# Patient Record
Sex: MALE | Race: WHITE | NOT HISPANIC OR LATINO | Employment: OTHER | URBAN - METROPOLITAN AREA
[De-identification: names, ages, dates, MRNs, and addresses within clinical notes are randomized per-mention and may not be internally consistent; named-entity substitution may affect disease eponyms.]

---

## 2017-01-03 ENCOUNTER — TRANSCRIBE ORDERS (OUTPATIENT)
Dept: ADMINISTRATIVE | Facility: HOSPITAL | Age: 77
End: 2017-01-03

## 2017-01-03 DIAGNOSIS — C23 MALIGNANT NEOPLASM OF GALLBLADDER (HCC): ICD-10-CM

## 2017-01-03 DIAGNOSIS — E83.42 HYPOMAGNESEMIA: ICD-10-CM

## 2017-01-03 DIAGNOSIS — C78.1 SECONDARY MALIGNANT NEOPLASM OF MEDIASTINUM (HCC): Primary | ICD-10-CM

## 2017-01-04 ENCOUNTER — TRANSCRIBE ORDERS (OUTPATIENT)
Dept: LAB | Facility: CLINIC | Age: 77
End: 2017-01-04

## 2017-01-04 ENCOUNTER — APPOINTMENT (OUTPATIENT)
Dept: LAB | Facility: CLINIC | Age: 77
End: 2017-01-04
Payer: MEDICARE

## 2017-01-04 DIAGNOSIS — E83.42 HYPOMAGNESEMIA: ICD-10-CM

## 2017-01-04 DIAGNOSIS — C23 MALIGNANT NEOPLASM OF GALLBLADDER (HCC): ICD-10-CM

## 2017-01-04 DIAGNOSIS — C74.10: Primary | ICD-10-CM

## 2017-01-04 DIAGNOSIS — C74.10: ICD-10-CM

## 2017-01-04 LAB
ANION GAP SERPL CALCULATED.3IONS-SCNC: 3 MMOL/L (ref 4–13)
BUN SERPL-MCNC: 16 MG/DL (ref 5–25)
CALCIUM SERPL-MCNC: 8.3 MG/DL (ref 8.3–10.1)
CHLORIDE SERPL-SCNC: 109 MMOL/L (ref 100–108)
CO2 SERPL-SCNC: 31 MMOL/L (ref 21–32)
CREAT SERPL-MCNC: 1.15 MG/DL (ref 0.6–1.3)
GFR SERPL CREATININE-BSD FRML MDRD: >60 ML/MIN/1.73SQ M
GLUCOSE SERPL-MCNC: 137 MG/DL (ref 65–140)
POTASSIUM SERPL-SCNC: 4.2 MMOL/L (ref 3.5–5.3)
SODIUM SERPL-SCNC: 143 MMOL/L (ref 136–145)

## 2017-01-04 PROCEDURE — 36415 COLL VENOUS BLD VENIPUNCTURE: CPT

## 2017-01-04 PROCEDURE — 80048 BASIC METABOLIC PNL TOTAL CA: CPT

## 2017-01-06 ENCOUNTER — HOSPITAL ENCOUNTER (OUTPATIENT)
Dept: RADIOLOGY | Age: 77
Discharge: HOME | End: 2017-01-06
Payer: MEDICARE

## 2017-01-06 DIAGNOSIS — E83.42 HYPOMAGNESEMIA: ICD-10-CM

## 2017-01-06 DIAGNOSIS — C78.1 SECONDARY MALIGNANT NEOPLASM OF MEDIASTINUM (HCC): ICD-10-CM

## 2017-01-06 DIAGNOSIS — C23 MALIGNANT NEOPLASM OF GALLBLADDER (HCC): ICD-10-CM

## 2017-01-06 PROCEDURE — 71260 CT THORAX DX C+: CPT

## 2017-01-06 PROCEDURE — 74177 CT ABD & PELVIS W/CONTRAST: CPT

## 2017-01-06 RX ADMIN — IOHEXOL 100 ML: 350 INJECTION, SOLUTION INTRAVENOUS at 09:53

## 2017-01-10 ENCOUNTER — ALLSCRIPTS OFFICE VISIT (OUTPATIENT)
Dept: OTHER | Facility: OTHER | Age: 77
End: 2017-01-10

## 2017-01-12 ENCOUNTER — ALLSCRIPTS OFFICE VISIT (OUTPATIENT)
Dept: OTHER | Facility: OTHER | Age: 77
End: 2017-01-12

## 2017-01-13 ENCOUNTER — GENERIC CONVERSION - ENCOUNTER (OUTPATIENT)
Dept: OTHER | Facility: OTHER | Age: 77
End: 2017-01-13

## 2017-01-17 ENCOUNTER — LAB CONVERSION - ENCOUNTER (OUTPATIENT)
Dept: OTHER | Facility: OTHER | Age: 77
End: 2017-01-17

## 2017-01-17 ENCOUNTER — LAB REQUISITION (OUTPATIENT)
Dept: LAB | Facility: HOSPITAL | Age: 77
End: 2017-01-17
Payer: MEDICARE

## 2017-01-17 DIAGNOSIS — E83.42 HYPOMAGNESEMIA: ICD-10-CM

## 2017-01-17 DIAGNOSIS — C7A.1 MALIGNANT POORLY DIFFERENTIATED NEUROENDOCRINE TUMORS (HCC): ICD-10-CM

## 2017-01-17 DIAGNOSIS — C23 MALIGNANT NEOPLASM OF GALLBLADDER (HCC): ICD-10-CM

## 2017-01-17 LAB
APTT PPP: 30 SECONDS (ref 24–36)
DEPRECATED D DIMER PPP: 784 NG/ML (FEU) (ref 0–424)
INR PPP: 1.11 (ref 0.86–1.16)
PROTHROMBIN TIME: 14.4 SECONDS (ref 12–14.3)

## 2017-01-17 PROCEDURE — 85730 THROMBOPLASTIN TIME PARTIAL: CPT | Performed by: NURSE PRACTITIONER

## 2017-01-17 PROCEDURE — 85610 PROTHROMBIN TIME: CPT | Performed by: NURSE PRACTITIONER

## 2017-01-17 PROCEDURE — 85379 FIBRIN DEGRADATION QUANT: CPT | Performed by: NURSE PRACTITIONER

## 2017-01-17 PROCEDURE — 83519 RIA NONANTIBODY: CPT | Performed by: NURSE PRACTITIONER

## 2017-01-25 LAB — NSE SERPL IA-MCNC: 1.4 NG/ML (ref 0–12.5)

## 2017-02-15 ENCOUNTER — ALLSCRIPTS OFFICE VISIT (OUTPATIENT)
Dept: OTHER | Facility: OTHER | Age: 77
End: 2017-02-15

## 2017-03-07 ENCOUNTER — GENERIC CONVERSION - ENCOUNTER (OUTPATIENT)
Dept: OTHER | Facility: OTHER | Age: 77
End: 2017-03-07

## 2017-04-13 ENCOUNTER — APPOINTMENT (OUTPATIENT)
Dept: RADIATION ONCOLOGY | Facility: HOSPITAL | Age: 77
End: 2017-04-13
Payer: MEDICARE

## 2017-04-13 ENCOUNTER — GENERIC CONVERSION - ENCOUNTER (OUTPATIENT)
Dept: OTHER | Facility: OTHER | Age: 77
End: 2017-04-13

## 2017-04-13 PROCEDURE — 99213 OFFICE O/P EST LOW 20 MIN: CPT | Performed by: RADIOLOGY

## 2017-04-17 ENCOUNTER — GENERIC CONVERSION - ENCOUNTER (OUTPATIENT)
Dept: OTHER | Facility: OTHER | Age: 77
End: 2017-04-17

## 2017-04-18 ENCOUNTER — LAB REQUISITION (OUTPATIENT)
Dept: LAB | Facility: HOSPITAL | Age: 77
End: 2017-04-18
Payer: MEDICARE

## 2017-04-18 DIAGNOSIS — C7A.1 MALIGNANT POORLY DIFFERENTIATED NEUROENDOCRINE TUMORS (HCC): ICD-10-CM

## 2017-04-18 DIAGNOSIS — E55.9 VITAMIN D DEFICIENCY: ICD-10-CM

## 2017-04-18 DIAGNOSIS — C23 MALIGNANT NEOPLASM OF GALLBLADDER (HCC): ICD-10-CM

## 2017-04-18 DIAGNOSIS — E83.42 HYPOMAGNESEMIA: ICD-10-CM

## 2017-04-18 LAB — 25(OH)D3 SERPL-MCNC: 37.8 NG/ML (ref 30–100)

## 2017-04-18 PROCEDURE — 82105 ALPHA-FETOPROTEIN SERUM: CPT | Performed by: NURSE PRACTITIONER

## 2017-04-18 PROCEDURE — 83519 RIA NONANTIBODY: CPT | Performed by: NURSE PRACTITIONER

## 2017-04-18 PROCEDURE — 82306 VITAMIN D 25 HYDROXY: CPT | Performed by: NURSE PRACTITIONER

## 2017-04-19 LAB — AFP-TM SERPL-MCNC: 1.3 NG/ML (ref 0–8.3)

## 2017-04-25 LAB — NSE SERPL IA-MCNC: 1.7 NG/ML (ref 0–12.5)

## 2017-04-27 ENCOUNTER — APPOINTMENT (EMERGENCY)
Dept: RADIOLOGY | Facility: HOSPITAL | Age: 77
End: 2017-04-27
Payer: MEDICARE

## 2017-04-27 ENCOUNTER — HOSPITAL ENCOUNTER (EMERGENCY)
Facility: HOSPITAL | Age: 77
Discharge: HOME | End: 2017-04-27
Attending: EMERGENCY MEDICINE | Admitting: EMERGENCY MEDICINE
Payer: MEDICARE

## 2017-04-27 VITALS
WEIGHT: 159.39 LBS | HEART RATE: 73 BPM | TEMPERATURE: 98.2 F | SYSTOLIC BLOOD PRESSURE: 115 MMHG | OXYGEN SATURATION: 100 % | RESPIRATION RATE: 18 BRPM | DIASTOLIC BLOOD PRESSURE: 66 MMHG

## 2017-04-27 DIAGNOSIS — J18.9 PNEUMONIA OF LEFT LUNG DUE TO INFECTIOUS ORGANISM, UNSPECIFIED PART OF LUNG: Primary | ICD-10-CM

## 2017-04-27 LAB
ALBUMIN SERPL BCP-MCNC: 2.5 G/DL (ref 3.5–5)
ALP SERPL-CCNC: 101 U/L (ref 46–116)
ALT SERPL W P-5'-P-CCNC: 20 U/L (ref 12–78)
ANION GAP SERPL CALCULATED.3IONS-SCNC: 7 MMOL/L (ref 4–13)
AST SERPL W P-5'-P-CCNC: 17 U/L (ref 5–45)
BASOPHILS # BLD AUTO: 0.02 THOUSANDS/ΜL (ref 0–0.1)
BASOPHILS NFR BLD AUTO: 0 % (ref 0–1)
BILIRUB SERPL-MCNC: 0.5 MG/DL (ref 0.2–1)
BUN SERPL-MCNC: 18 MG/DL (ref 5–25)
CALCIUM SERPL-MCNC: 8.6 MG/DL (ref 8.3–10.1)
CHLORIDE SERPL-SCNC: 103 MMOL/L (ref 100–108)
CO2 SERPL-SCNC: 29 MMOL/L (ref 21–32)
CREAT SERPL-MCNC: 1.34 MG/DL (ref 0.6–1.3)
EOSINOPHIL # BLD AUTO: 0.12 THOUSAND/ΜL (ref 0–0.61)
EOSINOPHIL NFR BLD AUTO: 1 % (ref 0–6)
ERYTHROCYTE [DISTWIDTH] IN BLOOD BY AUTOMATED COUNT: 14.5 % (ref 11.6–15.1)
GFR SERPL CREATININE-BSD FRML MDRD: 51.6 ML/MIN/1.73SQ M
GLUCOSE SERPL-MCNC: 148 MG/DL (ref 65–140)
HCT VFR BLD AUTO: 32.6 % (ref 36.5–49.3)
HGB BLD-MCNC: 10.6 G/DL (ref 12–17)
LYMPHOCYTES # BLD AUTO: 1.07 THOUSANDS/ΜL (ref 0.6–4.47)
LYMPHOCYTES NFR BLD AUTO: 10 % (ref 14–44)
MCH RBC QN AUTO: 29.9 PG (ref 26.8–34.3)
MCHC RBC AUTO-ENTMCNC: 32.5 G/DL (ref 31.4–37.4)
MCV RBC AUTO: 92 FL (ref 82–98)
MONOCYTES # BLD AUTO: 1.28 THOUSAND/ΜL (ref 0.17–1.22)
MONOCYTES NFR BLD AUTO: 12 % (ref 4–12)
NEUTROPHILS # BLD AUTO: 7.96 THOUSANDS/ΜL (ref 1.85–7.62)
NEUTS SEG NFR BLD AUTO: 77 % (ref 43–75)
PLATELET # BLD AUTO: 124 THOUSANDS/UL (ref 149–390)
PMV BLD AUTO: 9.8 FL (ref 8.9–12.7)
POTASSIUM SERPL-SCNC: 3.6 MMOL/L (ref 3.5–5.3)
PROT SERPL-MCNC: 7.6 G/DL (ref 6.4–8.2)
RBC # BLD AUTO: 3.55 MILLION/UL (ref 3.88–5.62)
SODIUM SERPL-SCNC: 139 MMOL/L (ref 136–145)
WBC # BLD AUTO: 10.45 THOUSAND/UL (ref 4.31–10.16)

## 2017-04-27 PROCEDURE — 36415 COLL VENOUS BLD VENIPUNCTURE: CPT | Performed by: PHYSICIAN ASSISTANT

## 2017-04-27 PROCEDURE — 80053 COMPREHEN METABOLIC PANEL: CPT | Performed by: PHYSICIAN ASSISTANT

## 2017-04-27 PROCEDURE — 85025 COMPLETE CBC W/AUTO DIFF WBC: CPT | Performed by: PHYSICIAN ASSISTANT

## 2017-04-27 PROCEDURE — 99283 EMERGENCY DEPT VISIT LOW MDM: CPT

## 2017-04-27 PROCEDURE — 71020 HB CHEST X-RAY 2VW FRONTAL&LATL: CPT

## 2017-04-27 RX ORDER — AZITHROMYCIN 250 MG/1
250 TABLET, FILM COATED ORAL DAILY
Qty: 4 TABLET | Refills: 0 | Status: SHIPPED | OUTPATIENT
Start: 2017-04-27 | End: 2017-05-01

## 2017-04-27 RX ORDER — AZITHROMYCIN 250 MG/1
500 TABLET, FILM COATED ORAL ONCE
Status: COMPLETED | OUTPATIENT
Start: 2017-04-27 | End: 2017-04-27

## 2017-04-27 RX ADMIN — AZITHROMYCIN 500 MG: 250 TABLET, FILM COATED ORAL at 11:31

## 2017-05-02 ENCOUNTER — ALLSCRIPTS OFFICE VISIT (OUTPATIENT)
Dept: OTHER | Facility: OTHER | Age: 77
End: 2017-05-02

## 2017-05-02 DIAGNOSIS — J18.9 PNEUMONIA: ICD-10-CM

## 2017-05-11 ENCOUNTER — TRANSCRIBE ORDERS (OUTPATIENT)
Dept: ADMINISTRATIVE | Age: 77
End: 2017-05-11

## 2017-05-11 ENCOUNTER — HOSPITAL ENCOUNTER (OUTPATIENT)
Dept: RADIOLOGY | Age: 77
Discharge: HOME | End: 2017-05-11
Payer: MEDICARE

## 2017-05-11 DIAGNOSIS — J18.9 PNEUMONIA: ICD-10-CM

## 2017-05-11 PROCEDURE — 71020 HB CHEST X-RAY 2VW FRONTAL&LATL: CPT

## 2017-06-01 ENCOUNTER — GENERIC CONVERSION - ENCOUNTER (OUTPATIENT)
Dept: OTHER | Facility: OTHER | Age: 77
End: 2017-06-01

## 2017-07-11 ENCOUNTER — TRANSCRIBE ORDERS (OUTPATIENT)
Dept: ADMINISTRATIVE | Facility: HOSPITAL | Age: 77
End: 2017-07-11

## 2017-07-11 ENCOUNTER — LAB REQUISITION (OUTPATIENT)
Dept: LAB | Facility: HOSPITAL | Age: 77
End: 2017-07-11
Payer: MEDICARE

## 2017-07-11 ENCOUNTER — GENERIC CONVERSION - ENCOUNTER (OUTPATIENT)
Dept: OTHER | Facility: OTHER | Age: 77
End: 2017-07-11

## 2017-07-11 DIAGNOSIS — C78.1 SECONDARY MALIGNANT NEOPLASM OF MEDIASTINUM (HCC): Primary | ICD-10-CM

## 2017-07-11 DIAGNOSIS — E55.9 VITAMIN D DEFICIENCY: ICD-10-CM

## 2017-07-11 DIAGNOSIS — C23 MALIGNANT NEOPLASM OF GALLBLADDER (HCC): ICD-10-CM

## 2017-07-11 DIAGNOSIS — C7A.1 MALIGNANT POORLY DIFFERENTIATED NEUROENDOCRINE TUMORS (HCC): ICD-10-CM

## 2017-07-11 LAB — 25(OH)D3 SERPL-MCNC: 38.8 NG/ML (ref 30–100)

## 2017-07-11 PROCEDURE — 82306 VITAMIN D 25 HYDROXY: CPT | Performed by: NURSE PRACTITIONER

## 2017-07-11 PROCEDURE — 82105 ALPHA-FETOPROTEIN SERUM: CPT | Performed by: NURSE PRACTITIONER

## 2017-07-11 PROCEDURE — 83519 RIA NONANTIBODY: CPT | Performed by: NURSE PRACTITIONER

## 2017-07-12 LAB — AFP-TM SERPL-MCNC: 1.7 NG/ML (ref 0–8.3)

## 2017-07-13 LAB — NSE SERPL IA-MCNC: 4 NG/ML (ref 0–12.5)

## 2017-07-18 ENCOUNTER — ALLSCRIPTS OFFICE VISIT (OUTPATIENT)
Dept: OTHER | Facility: OTHER | Age: 77
End: 2017-07-18

## 2017-07-19 ENCOUNTER — HOSPITAL ENCOUNTER (OUTPATIENT)
Dept: RADIOLOGY | Age: 77
Discharge: HOME | End: 2017-07-19
Payer: MEDICARE

## 2017-07-19 DIAGNOSIS — C78.1 SECONDARY MALIGNANT NEOPLASM OF MEDIASTINUM (HCC): ICD-10-CM

## 2017-07-19 PROCEDURE — 71260 CT THORAX DX C+: CPT

## 2017-07-19 PROCEDURE — 74177 CT ABD & PELVIS W/CONTRAST: CPT

## 2017-07-19 RX ADMIN — IOHEXOL 100 ML: 350 INJECTION, SOLUTION INTRAVENOUS at 15:22

## 2017-08-08 ENCOUNTER — GENERIC CONVERSION - ENCOUNTER (OUTPATIENT)
Dept: OTHER | Facility: OTHER | Age: 77
End: 2017-08-08

## 2017-08-15 ENCOUNTER — ALLSCRIPTS OFFICE VISIT (OUTPATIENT)
Dept: OTHER | Facility: OTHER | Age: 77
End: 2017-08-15

## 2017-09-12 ENCOUNTER — GENERIC CONVERSION - ENCOUNTER (OUTPATIENT)
Dept: OTHER | Facility: OTHER | Age: 77
End: 2017-09-12

## 2017-10-13 ENCOUNTER — LAB CONVERSION - ENCOUNTER (OUTPATIENT)
Dept: OTHER | Facility: OTHER | Age: 77
End: 2017-10-13

## 2017-10-13 ENCOUNTER — LAB REQUISITION (OUTPATIENT)
Dept: LAB | Facility: HOSPITAL | Age: 77
End: 2017-10-13
Payer: MEDICARE

## 2017-10-13 DIAGNOSIS — C23 MALIGNANT NEOPLASM OF GALLBLADDER (HCC): ICD-10-CM

## 2017-10-13 DIAGNOSIS — C7A.1 MALIGNANT POORLY DIFFERENTIATED NEUROENDOCRINE TUMORS (HCC): ICD-10-CM

## 2017-10-13 PROCEDURE — 83519 RIA NONANTIBODY: CPT | Performed by: INTERNAL MEDICINE

## 2017-10-13 PROCEDURE — 82105 ALPHA-FETOPROTEIN SERUM: CPT | Performed by: INTERNAL MEDICINE

## 2017-10-14 LAB — AFP-TM SERPL-MCNC: 1.5 NG/ML (ref 0–8.3)

## 2017-10-19 LAB — NSE SERPL IA-MCNC: 4.9 NG/ML (ref 0–12.5)

## 2017-11-16 ENCOUNTER — GENERIC CONVERSION - ENCOUNTER (OUTPATIENT)
Dept: OTHER | Facility: OTHER | Age: 77
End: 2017-11-16

## 2017-11-16 ENCOUNTER — APPOINTMENT (OUTPATIENT)
Dept: RADIATION ONCOLOGY | Facility: HOSPITAL | Age: 77
End: 2017-11-16
Attending: RADIOLOGY
Payer: MEDICARE

## 2017-11-16 PROCEDURE — 99214 OFFICE O/P EST MOD 30 MIN: CPT | Performed by: RADIOLOGY

## 2017-12-06 ENCOUNTER — ALLSCRIPTS OFFICE VISIT (OUTPATIENT)
Dept: OTHER | Facility: OTHER | Age: 77
End: 2017-12-06

## 2017-12-06 DIAGNOSIS — I44.1 SECOND DEGREE ATRIOVENTRICULAR BLOCK: ICD-10-CM

## 2017-12-06 DIAGNOSIS — I25.10 ATHEROSCLEROTIC HEART DISEASE OF NATIVE CORONARY ARTERY WITHOUT ANGINA PECTORIS: ICD-10-CM

## 2017-12-20 ENCOUNTER — APPOINTMENT (OUTPATIENT)
Dept: LAB | Facility: MEDICAL CENTER | Age: 77
End: 2017-12-20
Payer: MEDICARE

## 2017-12-20 DIAGNOSIS — I25.10 ATHEROSCLEROTIC HEART DISEASE OF NATIVE CORONARY ARTERY WITHOUT ANGINA PECTORIS: ICD-10-CM

## 2017-12-20 DIAGNOSIS — I44.1 SECOND DEGREE ATRIOVENTRICULAR BLOCK: ICD-10-CM

## 2017-12-20 LAB
ALBUMIN SERPL BCP-MCNC: 2.8 G/DL (ref 3.5–5)
ALP SERPL-CCNC: 92 U/L (ref 46–116)
ALT SERPL W P-5'-P-CCNC: 41 U/L (ref 12–78)
AST SERPL W P-5'-P-CCNC: 24 U/L (ref 5–45)
BILIRUB DIRECT SERPL-MCNC: 0.15 MG/DL (ref 0–0.2)
BILIRUB SERPL-MCNC: 0.4 MG/DL (ref 0.2–1)
CHOLEST SERPL-MCNC: 108 MG/DL (ref 50–200)
HDLC SERPL-MCNC: 66 MG/DL (ref 40–60)
LDLC SERPL CALC-MCNC: 32 MG/DL (ref 0–100)
PROT SERPL-MCNC: 7.5 G/DL (ref 6.4–8.2)
TRIGL SERPL-MCNC: 49 MG/DL

## 2017-12-20 PROCEDURE — 80061 LIPID PANEL: CPT

## 2017-12-20 PROCEDURE — 36415 COLL VENOUS BLD VENIPUNCTURE: CPT

## 2017-12-20 PROCEDURE — 80076 HEPATIC FUNCTION PANEL: CPT

## 2017-12-22 ENCOUNTER — HOSPITAL ENCOUNTER (OUTPATIENT)
Dept: NON INVASIVE DIAGNOSTICS | Facility: CLINIC | Age: 77
Discharge: HOME | End: 2017-12-22
Payer: MEDICARE

## 2017-12-22 ENCOUNTER — GENERIC CONVERSION - ENCOUNTER (OUTPATIENT)
Dept: CARDIOLOGY CLINIC | Facility: CLINIC | Age: 77
End: 2017-12-22

## 2017-12-22 DIAGNOSIS — I44.1 SECOND DEGREE ATRIOVENTRICULAR BLOCK: ICD-10-CM

## 2017-12-22 PROCEDURE — 93306 TTE W/DOPPLER COMPLETE: CPT

## 2017-12-22 PROCEDURE — 93226 XTRNL ECG REC<48 HR SCAN A/R: CPT

## 2017-12-22 PROCEDURE — 93225 XTRNL ECG REC<48 HRS REC: CPT

## 2018-01-12 VITALS
WEIGHT: 153 LBS | DIASTOLIC BLOOD PRESSURE: 60 MMHG | TEMPERATURE: 97.5 F | HEART RATE: 78 BPM | HEIGHT: 70 IN | BODY MASS INDEX: 21.9 KG/M2 | SYSTOLIC BLOOD PRESSURE: 110 MMHG | OXYGEN SATURATION: 98 %

## 2018-01-12 VITALS
DIASTOLIC BLOOD PRESSURE: 52 MMHG | BODY MASS INDEX: 22.19 KG/M2 | SYSTOLIC BLOOD PRESSURE: 102 MMHG | HEART RATE: 67 BPM | WEIGHT: 155 LBS | OXYGEN SATURATION: 98 % | TEMPERATURE: 97.4 F | HEIGHT: 70 IN | RESPIRATION RATE: 16 BRPM

## 2018-01-13 VITALS
HEIGHT: 70 IN | BODY MASS INDEX: 22.19 KG/M2 | RESPIRATION RATE: 18 BRPM | HEART RATE: 93 BPM | DIASTOLIC BLOOD PRESSURE: 58 MMHG | TEMPERATURE: 96.4 F | WEIGHT: 155 LBS | SYSTOLIC BLOOD PRESSURE: 106 MMHG | OXYGEN SATURATION: 99 %

## 2018-01-13 VITALS
SYSTOLIC BLOOD PRESSURE: 102 MMHG | RESPIRATION RATE: 16 BRPM | WEIGHT: 154.03 LBS | HEART RATE: 79 BPM | HEIGHT: 70 IN | OXYGEN SATURATION: 99 % | TEMPERATURE: 97.6 F | DIASTOLIC BLOOD PRESSURE: 64 MMHG | BODY MASS INDEX: 22.05 KG/M2

## 2018-01-13 VITALS
TEMPERATURE: 97.4 F | RESPIRATION RATE: 24 BRPM | DIASTOLIC BLOOD PRESSURE: 54 MMHG | HEIGHT: 70 IN | HEART RATE: 68 BPM | WEIGHT: 155 LBS | BODY MASS INDEX: 22.19 KG/M2 | SYSTOLIC BLOOD PRESSURE: 118 MMHG

## 2018-01-14 VITALS
TEMPERATURE: 97.4 F | DIASTOLIC BLOOD PRESSURE: 70 MMHG | WEIGHT: 154 LBS | BODY MASS INDEX: 22.05 KG/M2 | SYSTOLIC BLOOD PRESSURE: 126 MMHG | RESPIRATION RATE: 16 BRPM | HEIGHT: 70 IN | HEART RATE: 62 BPM

## 2018-01-15 VITALS
HEIGHT: 70 IN | SYSTOLIC BLOOD PRESSURE: 104 MMHG | BODY MASS INDEX: 21.9 KG/M2 | OXYGEN SATURATION: 98 % | WEIGHT: 153 LBS | TEMPERATURE: 97.5 F | HEART RATE: 65 BPM | DIASTOLIC BLOOD PRESSURE: 58 MMHG

## 2018-01-16 ENCOUNTER — ALLSCRIPTS OFFICE VISIT (OUTPATIENT)
Dept: OTHER | Facility: OTHER | Age: 78
End: 2018-01-16

## 2018-01-17 NOTE — PROGRESS NOTES
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)   2  History of gallbladder cancer (V10 09) (Z85 09)    Plan   Carcinoma of esophagus    · Follow-up visit in 6 months Evaluation and Treatment  Follow-up  Status: Hold For -    Scheduling  Requested for: [id number]   Ordered; For: Carcinoma of esophagus; Ordered By: Edith Maddox Performed:  Due: 89NRB3349    Discussion/Summary   Discussion Summary:    74-year-old male with a remote history of gallbladder carcinoma and now with a [id number] esophageal carcinoma  The initial pathology revealed a neuroendocrine carcinoma, but the surgical pathology on the resection revealed adenocarcinoma with neuroendocrine features  At this time he is doing relatively well  He is clinically MILTON from his esophageal cancer at nearly 3 years and 10 years from his gallbladder cancer recurrence  He will continue his current regimen of nutritional supplements to maintain his weight  He is following up with his medical oncologist this week  I will see him again in 6 months for another exam  His imaging is usually ordered by his medical oncologist so we will await the next set of imaging  He will continue endoscopic surveillance with his gastroenterologist  He is agreeable to this  All his questions were answered  Counseling Documentation With Imm: The patient, patient's family was counseled regarding prognosis  Goals and Barriers: The patient has the current Goals: Cure  The patent has the current Barriers: None  Patient's Capacity to Self-Care: Patient is able to Self-Care  Medication SE Review and Pt Understands Tx: The treatment plan was reviewed with the patient/guardian  The patient/guardian understands and agrees with the treatment plan    Self Referrals:    Self Referrals: No      Chief Complaint   Chief Complaint Free Text Note Form: Patient here for a six month follow up for carcinoma of esophagus   Patient has no complaints today      History of Present Illness Diagnosis and Staging: Gallbladder cancer: T2N0M0   initially treated 2005 treated February 2008 30cmand 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 March 2015    Treatment History: Radical cholecystectomy with liver resection 2005 treated with resection of segment 5 lesion in 2008 and etoposide November 2014 for his esophageal carcinoma completed January 20, 2015 esophagectomy, March 2015    Current Therapy: Observation    Disease Status: MILTON    Interval History: Patient returns after his esophagectomy  He is doing well at this time with no complaints  His weight is stable  He continues to have some early satiety and he does vomit if he eats too much  He is eating all food without difficulty  His last CT in July was negative, he thinks he will be getting a CT shortly after he sees medical oncology later this week  Review of Systems   Complete Male ROS Surg Onc:      Constitutional: The patient denies new or recent history of general fatigue, no recent weight loss, no change in appetite  Eyes: No complaints of visual problems, no scleral icterus  ENT: no complaints of ear pain, no hoarseness, no difficulty swallowing,-- no tinnitus-- and-- no new masses in head, oral cavity, or neck  Cardiovascular: No complaints of chest pain, no palpitations, no ankle edema  Respiratory: No complaints of shortness of breath, no cough  Gastrointestinal: No complaints of jaundice, no bloody stools, no pale stools  Genitourinary: No complaints of dysuria, no hematuria, no nocturia, no frequent urination, no urethral discharge  Musculoskeletal: No complaints of weakness, paralysis, joint stiffness or arthralgias,  Integumentary: No complaints of rash, no new lesions  Neurological: No complaints of convulsions, no seizures, no dizziness  Hematologic/Lymphatic: No complaints of easy bruising  ROS Reviewed:    ROS reviewed        Active Problems 1  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)   2  Cataract (366 9) (H26 9)   3  Community acquired pneumonia (5) (J18 9)   4  Coronary artery disease with hx of myocardial infarct w/o hx of CABG (521 42,432)     (I25 10,I25 2)   5  Encounter for pre-operative cardiovascular clearance (V72 81) (Z01 810)   6  Enlarged prostate without lower urinary tract symptoms (luts) (600 00) (N40 0)   7  Epistaxis (784 7) (R04 0)   8  GERD without esophagitis (530 81) (K21 9)   9  Gout (274 9) (M10 9)   10  Hypercholesterolemia (272 0) (E78 00)   11  Medicare annual wellness visit, initial (V70 0) (Z00 00)   12  Need for vaccination with 13-polyvalent pneumococcal conjugate vaccine (V03 82) (Z23)   13  Screening for depression (V79 0) (Z13 89)   14  Screening for genitourinary condition (V81 6) (Z13 89)   15  Screening for neurological condition (V80 09) (Z13 89)   16  Seborrheic keratosis (702 19) (L82 1)   17  Second degree AV block, Mobitz type I (426 13) (I44 1)   18  Skin lesion of face (709 9) (L98 9)   19  Weight loss (783 21) (R63 4)    Past Medical History   1  History of Abnormal EKG (794 31) (R94 31)   2  History of Acute Myocardial Infarction (V12 59)   3  History of Benign essential hypertension (401 1) (I10)   4  History of Diabetic hypoglycemia (250 80) (E11 649)   5  History of Dry skin (701 1) (L85 3)   6  History of fatigue (V13 89) (Z87 898)   7  History of hypotension (V12 59) (Z86 79)   8  History of myocardial infarction (412) (I25 2)   9  History of type 2 diabetes mellitus (V12 29) (Z86 39)   10  History of Hypoxia (799 02) (R09 02)   11  History of Liver Cancer (V10 07)   12  History of Need for influenza vaccination (V04 81) (Z23)   13  History of Obesity (278 00) (E66 9)   14  History of Puncture Wound Of Thumb (883 0)   15  History of Superficial Injury Of Foot (917 8)   16  History of Tachycardia (785 0) (R00 0)   17  History of Vitamin D deficiency (268 9) (E55 9)    Surgical History   1   History of Cataract Extraction With Insertion Of Intraocular Lens   · bilateral   2  History of Cath Placement Of Stent 1   3  History of Gallbladder Surgery   4  History of Hip Surgery   5  History of Previous Laser Angioplasty   · with stent placement   6  History of Tonsillectomy  Surgical History Reviewed: The surgical history was reviewed and updated today  Family History   Mother    1  Family history of Leukemia (V16 6)  Son    2  Family history of Lymphoma (V16 7)   3  Family history of Lymphoma (V16 7)  Family History Reviewed: The family history was reviewed and updated today  Social History    · Alcohol Use (History)   · Denied: Drug use (305 90) (F19 90)   · Former smoker (P70 62) (W51 407)  Social History Reviewed: The social history was reviewed and updated today  Current Meds    1  Allopurinol 100 MG Oral Tablet; 1 TAB TWICE A DAY; Therapy: 00KWT9287 to (Evaluate:05Jan2018)  Requested for: 89DKI5617; Last     Rx:10Jan2017 Ordered   2  Aspirin 81 MG TABS; TAKE 2 TABLET Every morning; Therapy: 53Mtt9997 to Recorded   3  Atorvastatin Calcium 40 MG Oral Tablet; TAKE 1 5 TABLET Daily; Therapy: 93VOR5418 to (Last Rx:10Jan2017) Ordered   4  Finasteride 5 MG Oral Tablet; 1 TAB AT BEDTIME; Last Rx:10Jan2017 Ordered   5  Fortify Daily Probiotic CAPS; take 1 capsule daily; Therapy: (Recorded:13Apr2017) to Recorded   6  Iron 325 (65 Fe) MG Oral Tablet; TAKE 1 TABLET DAILY AS DIRECTED; Therapy: 87XXB5554 to Recorded   7  Magnesium Oxide 400 MG Oral Capsule; TAKE 2 CAPSULE Daily; Therapy: (Recorded:44Gqg5283) to Recorded   8  NexIUM 40 MG Oral Capsule Delayed Release; TAKE 1 CAPSULE ONCE DAILY; Therapy: (Recorded:13Oct2016) to Recorded   9  Systane Ultra 0 4-0 3 % Ophthalmic Solution; 1 drop each eye daily; Therapy: (Recorded:13Oct2016) to Recorded   10  Terazosin HCl - 1 MG Oral Capsule; TAKE 1 CAPSULE AT BEDTIME NIGHTLY;       Therapy: 14FXW1907 to (Evaluate:05Jan2018) Requested for: 06JIK2802; Last      Rx:10Jan2017 Ordered   11  Vitamin B12 TABS; 1000 mcg take 2 tablets daily; Therapy: (Recorded:10Jan2017) to Recorded   12  Vitamin D 77947 UNIT CAPS; TAKE 1 CAPSULE WEEKLY; Therapy: (Recorded:13Oct2016) to Recorded   13  Zantac 150 MG Oral Tablet; TAKE 1 TABLET AT BEDTIME; Therapy: (Recorded:13Oct2016) to Recorded  Medication List Reviewed: The medication list was reviewed and updated today  Allergies   1  CARBOplatin SOLN    Vitals   Vital Signs    Recorded: 83DDU1274 08:44AM   Temperature 97 4 F   Heart Rate 93   Respiration 16   Systolic 006   Diastolic 58   Height 5 ft 9 in   Weight 155 lb    BMI Calculated 22 89   BSA Calculated 1 85   O2 Saturation 98     Physical Exam        Constitutional: General appearance: The Patient is well-developed, well-nourished male who appears his stated age in no acute distress  He is pleasant and talkative  HEENT: Sclerae are anicteric  -- Mucous membranes are moist  -- Neck is supple without adenopathy  No JVD  Chest: The lungs are clear to auscultation  Cardiac: Heart is regular rate  Abdomen: Abdomen is soft, nontender without masses  Extremities: There is no clubbing or cyanosis  -- There is no edema  Neuro: Grossly nonfocal  -- Gait is normal        Lymphatic: no evidence of cervical adenopathy bilaterally  -- no evidence of axillary adenopathy bilaterally  -- no evidence of inguinal adenopathy bilaterally  Skin: Warm, anicteric  Health Management   Enlarged prostate without lower urinary tract symptoms (luts)   (1) PSA, DIAGNOSTIC (FOLLOW-UP); every 1 year; Last 94FXX9110; Next Due: 09KMM5867; Overdue  History of Benign essential hypertension   (1) COMPREHENSIVE METABOLIC PANEL; every 1 year; Last 95Dph8857; Next Due: 78CLE5541; Overdue  (1) LIPID PANEL, FASTING; every 1 year; Last 53Vlk8801; Next Due: 92Lhv5146; Active  EKG/ECG- POC; every 1 year;  Last 32XWH2370; Next Due: 81YBD3635; Active  History of Encounter for screening colonoscopy   COLONOSCOPY; every 5 years; Next Due: 38Umd9108; Overdue  History of type 2 diabetes mellitus   (1) HEMOGLOBIN A1C; every 3 months; Last 45Yzj5424; Next Due: 54WVF1153; Overdue  (1) MICROALBUMIN CREATININE RATIO, RANDOM URINE; every 1 year; Last 28Vwb6331; Next Due:    15YIE7203; Overdue  *VB - Eye Exam; every 1 year; Last 57Sqs5907; Next Due: 68Gyy6455; Overdue  *VB - Foot Exam; every 1 year; Last 30KII9672; Next Due: 69YAN3472; Overdue  Blood Glucose- POC; every 3 months; Last 16PQU1260; Next Due: 16SKI2327; Overdue  Urine Dip Non-Automated- POC; every 3 months; Last 84BOI7387; Next Due: 09QHP2021; Overdue  Screening for depression   *VB-Depression Screening; every 1 year; Last 18QLP5384; Next Due: 04KSI1292; Overdue  Screening for genitourinary condition   *VB - Fall Risk Assessment  (Dx Z13 89 Screen for Neurologic Disorder); every 1 year; Last    13TBL9708; Next Due: 97DYO0662; Overdue  *VB - Urinary Incontinence Screen (Dx Z13 89 Screen for UI); every 1 year; Last 54EHI8200; Next    Due: 16Gzh6377; Near Due  Screening for neurological condition   *VB - Fall Risk Assessment  (Dx Z13 89 Screen for Neurologic Disorder); every 1 year; Last    61OQC0007; Next Due: 33ERS9216; Overdue  *VB - Urinary Incontinence Screen (Dx Z13 89 Screen for UI); every 1 year; Last 85YWI8015; Next    Due: 45Xhy1470; Near Due  [facility] Maintenance   Medicare Annual Wellness Visit; every 1 year; Last 23SOX3448; Next Due: 09TZO9592; Overdue    Future Appointments      Date/Time Provider Specialty Site   02/20/2018 02:00 PM Long Mattson DO [facility]     End of Encounter Meds   1  Finasteride 5 MG Oral Tablet (Proscar); 1 TAB AT BEDTIME; Last Rx:10Jan2017 Ordered   2  Terazosin HCl - 1 MG Oral Capsule; TAKE 1 CAPSULE AT BEDTIME NIGHTLY;      Therapy: 89CSC4865 to (Evaluate:05Jan2018)  Requested for: 41NSA3381; Last Rx:10Jan2017 Ordered  3  Allopurinol 100 MG Oral Tablet; 1 TAB TWICE A DAY; Therapy: 88LNE8850 to (Evaluate:05Jan2018)  Requested for: 72WDG0207; Last     Rx:10Jan2017 Ordered  4  Magnesium Oxide 400 MG Oral Capsule; TAKE 2 CAPSULE Daily; Therapy: (Recorded:92Tcs9247) to Recorded  5  Atorvastatin Calcium 40 MG Oral Tablet; TAKE 1 5 TABLET Daily; Therapy: 66KDC7472 to (Last Rx:10Jan2017) Ordered  6  Fortify Daily Probiotic CAPS; take 1 capsule daily; Therapy: (Recorded:13Apr2017) to Recorded  7  Aspirin 81 MG TABS; TAKE 2 TABLET Every morning; Therapy: 76Ncq2443 to Recorded   8  Iron 325 (65 Fe) MG Oral Tablet; TAKE 1 TABLET DAILY AS DIRECTED; Therapy: 83LSQ2364 to Recorded   9  NexIUM 40 MG Oral Capsule Delayed Release (Esomeprazole Magnesium); TAKE 1     CAPSULE ONCE DAILY; Therapy: (Recorded:13Oct2016) to Recorded   10  Systane Ultra 0 4-0 3 % Ophthalmic Solution; 1 drop each eye daily; Therapy: (Recorded:13Oct2016) to Recorded   11  Vitamin B12 TABS; 1000 mcg take 2 tablets daily; Therapy: (Recorded:10Jan2017) to Recorded   12  Vitamin D 68024 UNIT CAPS; TAKE 1 CAPSULE WEEKLY; Therapy: (Recorded:13Oct2016) to Recorded   13  Zantac 150 MG Oral Tablet (RaNITidine HCl); TAKE 1 TABLET AT BEDTIME;       Therapy: (Recorded:13Oct2016) to Recorded    Signatures    Electronically signed by : GABRIELLE Bull ; Jan 16 2018  8:58AM EST                       (Author)

## 2018-01-18 ENCOUNTER — LAB REQUISITION (OUTPATIENT)
Dept: LAB | Facility: HOSPITAL | Age: 78
End: 2018-01-18
Payer: MEDICARE

## 2018-01-18 DIAGNOSIS — E83.42 HYPOMAGNESEMIA: ICD-10-CM

## 2018-01-18 DIAGNOSIS — C7A.1 MALIGNANT POORLY DIFFERENTIATED NEUROENDOCRINE TUMORS (HCC): ICD-10-CM

## 2018-01-18 DIAGNOSIS — C23 MALIGNANT NEOPLASM OF GALLBLADDER (HCC): ICD-10-CM

## 2018-01-18 LAB — AFP-TM SERPL-MCNC: 1.6 NG/ML (ref 0.5–8)

## 2018-01-18 PROCEDURE — 83519 RIA NONANTIBODY: CPT | Performed by: NURSE PRACTITIONER

## 2018-01-18 PROCEDURE — 82105 ALPHA-FETOPROTEIN SERUM: CPT | Performed by: NURSE PRACTITIONER

## 2018-01-22 ENCOUNTER — TRANSCRIBE ORDERS (OUTPATIENT)
Dept: ADMINISTRATIVE | Facility: HOSPITAL | Age: 78
End: 2018-01-22

## 2018-01-22 VITALS
SYSTOLIC BLOOD PRESSURE: 110 MMHG | BODY MASS INDEX: 21.62 KG/M2 | DIASTOLIC BLOOD PRESSURE: 58 MMHG | HEIGHT: 70 IN | RESPIRATION RATE: 16 BRPM | HEART RATE: 66 BPM | TEMPERATURE: 98.1 F | WEIGHT: 151 LBS | OXYGEN SATURATION: 98 %

## 2018-01-22 DIAGNOSIS — R60.0 LOCALIZED EDEMA: Primary | ICD-10-CM

## 2018-01-22 DIAGNOSIS — M79.606 PAIN OF LOWER EXTREMITY, UNSPECIFIED LATERALITY: ICD-10-CM

## 2018-01-23 VITALS
BODY MASS INDEX: 22.96 KG/M2 | RESPIRATION RATE: 16 BRPM | HEART RATE: 93 BPM | OXYGEN SATURATION: 98 % | SYSTOLIC BLOOD PRESSURE: 122 MMHG | WEIGHT: 155 LBS | TEMPERATURE: 97.4 F | DIASTOLIC BLOOD PRESSURE: 58 MMHG | HEIGHT: 69 IN

## 2018-01-24 VITALS
WEIGHT: 150 LBS | DIASTOLIC BLOOD PRESSURE: 56 MMHG | HEIGHT: 69 IN | SYSTOLIC BLOOD PRESSURE: 120 MMHG | HEART RATE: 50 BPM | BODY MASS INDEX: 22.22 KG/M2

## 2018-01-25 LAB — NSE SERPL IA-MCNC: 2.9 NG/ML (ref 0–12.5)

## 2018-01-26 ENCOUNTER — HOSPITAL ENCOUNTER (OUTPATIENT)
Dept: NON INVASIVE DIAGNOSTICS | Facility: CLINIC | Age: 78
Discharge: HOME | End: 2018-01-26
Payer: MEDICARE

## 2018-01-26 DIAGNOSIS — R60.0 LOCALIZED EDEMA: ICD-10-CM

## 2018-01-26 PROCEDURE — 93970 EXTREMITY STUDY: CPT

## 2018-01-27 PROCEDURE — 93970 EXTREMITY STUDY: CPT | Performed by: SURGERY

## 2018-02-20 ENCOUNTER — OFFICE VISIT (OUTPATIENT)
Dept: FAMILY MEDICINE CLINIC | Facility: CLINIC | Age: 78
End: 2018-02-20
Payer: MEDICARE

## 2018-02-20 VITALS
WEIGHT: 165 LBS | SYSTOLIC BLOOD PRESSURE: 126 MMHG | OXYGEN SATURATION: 98 % | BODY MASS INDEX: 24.44 KG/M2 | HEART RATE: 77 BPM | DIASTOLIC BLOOD PRESSURE: 64 MMHG | TEMPERATURE: 98.1 F | HEIGHT: 69 IN

## 2018-02-20 DIAGNOSIS — C15.9 CARCINOMA OF ESOPHAGUS (HCC): ICD-10-CM

## 2018-02-20 DIAGNOSIS — R60.0 EDEMA OF LOWER EXTREMITY: Primary | ICD-10-CM

## 2018-02-20 DIAGNOSIS — E78.00 HYPERCHOLESTEREMIA: ICD-10-CM

## 2018-02-20 DIAGNOSIS — K21.9 GERD WITHOUT ESOPHAGITIS: ICD-10-CM

## 2018-02-20 PROCEDURE — 99214 OFFICE O/P EST MOD 30 MIN: CPT | Performed by: FAMILY MEDICINE

## 2018-02-20 RX ORDER — ATORVASTATIN CALCIUM 40 MG/1
40 TABLET, FILM COATED ORAL DAILY
COMMUNITY
Start: 2018-01-19 | End: 2020-11-02

## 2018-02-20 RX ORDER — PNV NO.95/FERROUS FUM/FOLIC AC 28MG-0.8MG
1 TABLET ORAL DAILY
COMMUNITY
Start: 2017-12-06 | End: 2019-05-13 | Stop reason: ALTCHOICE

## 2018-02-20 RX ORDER — FINASTERIDE 5 MG/1
1 TABLET, FILM COATED ORAL
COMMUNITY
End: 2018-06-20 | Stop reason: SDUPTHER

## 2018-02-20 RX ORDER — TERAZOSIN 1 MG/1
1 CAPSULE ORAL
COMMUNITY
Start: 2011-05-18 | End: 2018-06-18 | Stop reason: SINTOL

## 2018-02-20 RX ORDER — ALLOPURINOL 100 MG/1
1 TABLET ORAL 2 TIMES DAILY
COMMUNITY
Start: 2011-05-18 | End: 2020-11-02

## 2018-02-20 RX ORDER — RANITIDINE 150 MG/1
1 TABLET ORAL
COMMUNITY
End: 2018-05-17 | Stop reason: ALTCHOICE

## 2018-02-20 RX ORDER — ERGOCALCIFEROL 1.25 MG/1
CAPSULE ORAL WEEKLY
COMMUNITY
Start: 2018-01-17

## 2018-02-20 RX ORDER — UBIDECARENONE 75 MG
CAPSULE ORAL DAILY
COMMUNITY
End: 2020-11-02

## 2018-02-20 RX ORDER — CALCIUM CARBONATE/VITAMIN D3 500-10/5ML
8 LIQUID (ML) ORAL DAILY
COMMUNITY
End: 2020-11-09 | Stop reason: HOSPADM

## 2018-02-20 RX ORDER — ESOMEPRAZOLE MAGNESIUM 40 MG/1
1 CAPSULE, DELAYED RELEASE ORAL DAILY
COMMUNITY
End: 2018-05-17 | Stop reason: ALTCHOICE

## 2018-02-20 RX ORDER — VIT C/ZINC GLUCONAT/ELDERBERRY 60 MG-5 MG
1 LOZENGE ORAL DAILY
COMMUNITY
End: 2018-05-17 | Stop reason: ALTCHOICE

## 2018-02-20 NOTE — PROGRESS NOTES
HPI:  Fani Norman is a 77 y o  male here for his Subsequent Wellness Visit  There is no problem list on file for this patient      Past Medical History:   Diagnosis Date    Abnormal EKG     Acute myocardial infarction     Benign essential hypertension     LAST ASSESSED: 99UYL7199    Diabetes mellitus [facility] [address]     Diabetic hypoglycemia ([facility] [address] )     LAST ASSESSED: 27LOP0289    Dry skin     LAST ASSESSED: 92FQC5517    Esophageal cancer ([facility] [address] )     H/O cancer of gall bladder     Hypotension     Hypoxia     Liver cancer (HCC)     Myocardial infarction     Obesity     LAST ASSESSED: 47IZV2626    Puncture wound of thumb     LAST ASSESSED: 10VIA3564    Superficial injury of foot     LAST ASSESSED: 30YYN8056    Tachycardia     Type 2 diabetes mellitus (HCC)     LAST ASSESSED: 27XYV0025    Vitamin D deficiency     LAST ASSESSED: 79VRY2408     Past Surgical History:   Procedure Laterality Date    ANGIOPLASTY      PREVIOUS LASER ANGIOPLASTY WITH STENT PLACEMENT    CATARACT EXTRACTION W/  INTRAOCULAR LENS IMPLANT Bilateral     CORONARY ANGIOPLASTY WITH STENT PLACEMENT      PLACEMENT OF STENT 1    GALLBLADDER SURGERY      HIP SURGERY      TONSILLECTOMY       Family History   Problem Relation Age of Onset    Leukemia Mother     Lymphoma Son     Lymphoma Son      History   Smoking Status    Never Smoker   Smokeless Tobacco    Never Used     Comment: FORMER SMOKER - SMOKED 2 PPD FOR 20 YRS AND QUIT 2/15/1975 AS PER [employer]     History   Alcohol Use No     Comment: (HISTORY) RARELY AS PER [employer]      History   Drug Use No     /64 (BP Location: Left arm, Patient Position: Sitting, Cuff Size: Standard)   Pulse 77   Temp 98 1 °F (36 7 °C) (Oral)   Ht 5' 9" (1 753 m)   Wt 74 8 kg (165 lb)   SpO2 98%   BMI 24 37 kg/m²       Current Outpatient Prescriptions   Medication Sig Dispense Refill    allopurinol (ZYLOPRIM) 100 mg tablet Take 1 tablet by mouth 2 (two) times a day      aspirin 81 MG tablet Take 2 tablets by mouth      atorvastatin (LIPITOR) 40 mg tablet       cyanocobalamin (VITAMIN B-12) 100 mcg tablet Take by mouth      ergocalciferol (VITAMIN D2) 50,000 units       esomeprazole (NEXIUM) 40 MG capsule Take 1 capsule by mouth daily      Ferrous Sulfate (IRON) 325 (65 Fe) MG TABS Take 1 tablet by mouth daily      finasteride (PROSCAR) 5 mg tablet Take 1 tablet by mouth      Magnesium Oxide 400 MG CAPS Take 2 capsules by mouth daily      polyethylene glycol-propylene glycol (SYSTANE ULTRA) 0 4-0 3 % Apply 1 drop to eye daily      Probiotic Product (FORTIFY DAILY PROBIOTIC) CAPS Take 1 capsule by mouth daily      ranitidine (ZANTAC) 150 mg tablet Take 1 tablet by mouth      terazosin (HYTRIN) 1 mg capsule Take 1 capsule by mouth       No current facility-administered medications for this visit        No Known Allergies  Immunization History   Administered Date(s) Administered    Influenza Quadrivalent Preservative Free 3 years and older IM 10/10/2014    Influenza Quadrivalent, 6-35 Months IM 09/02/2016    Pneumococcal Conjugate 13-Valent 06/30/2015    Pneumococcal Polysaccharide PPV23 11/18/2014    TD (adult) Preservative Free 03/08/2013       Patient Care Team:  Eugenio Ngo DO as PCP - MD Nayeli Domínguez MD Ronne Foley, MD Evern Oram, MD Fred Rower, Jey Menchaca MD    Medicare Screening Tests and Risk Assessments:  AWV Clinical     ISAR:       Once in a Lifetime Medicare Screening:       Medicare Screening Tests and Risk Assessment:   AAA Risk Assessment    Osteoporosis Risk Assessment    HIV Risk Assessment        Drug and Alcohol Use:   Tobacco use    Tobacco use duration    Tobacco Cessation Readiness    Alcohol use    Alcohol Treatment Readiness   Illicit Drug Use        Diet & Exercise:   Diet   How many servings a day of the following:   Exercise        Cognitive Impairment Screening:   Cognitive Impairment Screening Functional Ability/Level of Safety:   Hearing    Hearing Impairment Assessment    Current Activities    Help needed with the folllowing:    ADL    Fall Risk   Injury History       Home Safety:   Home Safety Risk Factors       Advanced Directives:   Advanced Directives    Patient's End of Life Decisions        Urinary Incontinence:       Glaucoma:            Provider Screening    No data filed        No exam data present    Physical Exam :  Physical Exam    Reviewed Updated [facility] Prior Wellness Visits:   Last Medicare wellness visit information was reviewed, patient interviewed , no change since last AWVyes  Last Medicare wellness visit information was reviewed, patient interviewed and updates made to the record today yes    Assessment and Plan:  No diagnosis found      Health Maintenance Due   Topic Date Due    SLP PLAN OF CARE  1940    COLONOSCOPY  1940    Depression Screening PHQ-9  01/15/1952    Fall Risk  01/15/2004    DTaP,Tdap,and Td Vaccines (2 - Tdap) 04/05/2013    GLAUCOMA SCREENING 67+ YR  12/17/2014    HEMOGLOBIN A1C  06/09/2015    INFLUENZA VACCINE  09/01/2017

## 2018-02-20 NOTE — PROGRESS NOTES
Assessment/Plan: patient here today for follow up for GERD  Pt c/o leg swelling     No problem-specific Assessment & Plan notes found for this encounter  There are no diagnoses linked to this encounter  Subjective:      Patient ID: Fred Rodriguez is a 77 y o  male  Follow up  BLLE edema present past month  Weight up 10 pounds past 5 weeks  Patient has been eating more - candy, milk shakes, spicy gum drops etc         The following portions of the patient's history were reviewed and updated as appropriate: allergies, current medications, past family history, past medical history, past social history, past surgical history and problem list     Review of Systems   Constitutional: Negative  HENT: Negative  Respiratory: Negative  Cardiovascular: Negative  Genitourinary: Negative  Musculoskeletal: Negative  Neurological: Negative  Psychiatric/Behavioral: Negative  Objective:      /64 (BP Location: Left arm, Patient Position: Sitting, Cuff Size: Standard)   Pulse 77   Temp 98 1 °F (36 7 °C) (Oral)   Ht 5' 9" (1 753 m)   Wt 74 8 kg (165 lb)   SpO2 98%   BMI 24 37 kg/m²          Physical Exam   Constitutional: He appears well-developed and well-nourished  HENT:   Head: Normocephalic and atraumatic  Eyes: Conjunctivae are normal  Pupils are equal, round, and reactive to light  Neck: Normal range of motion  Neck supple  Cardiovascular: Normal rate and regular rhythm  Pulmonary/Chest: Effort normal and breath sounds normal    Abdominal: Soft  Bowel sounds are normal    Musculoskeletal: He exhibits edema  +3 pitting edema, 1/2 way up BLLE  Skin: Skin is warm and dry  Psychiatric: He has a normal mood and affect   His behavior is normal  Judgment and thought content normal

## 2018-03-06 ENCOUNTER — OFFICE VISIT (OUTPATIENT)
Dept: FAMILY MEDICINE CLINIC | Facility: CLINIC | Age: 78
End: 2018-03-06
Payer: MEDICARE

## 2018-03-06 VITALS
DIASTOLIC BLOOD PRESSURE: 62 MMHG | OXYGEN SATURATION: 93 % | SYSTOLIC BLOOD PRESSURE: 116 MMHG | HEIGHT: 69 IN | WEIGHT: 152 LBS | TEMPERATURE: 97.7 F | HEART RATE: 69 BPM | BODY MASS INDEX: 22.51 KG/M2

## 2018-03-06 DIAGNOSIS — R12 HEART BURN: Primary | ICD-10-CM

## 2018-03-06 DIAGNOSIS — C15.9 MALIGNANT NEOPLASM OF ESOPHAGUS, UNSPECIFIED LOCATION (HCC): ICD-10-CM

## 2018-03-06 DIAGNOSIS — E55.9 VITAMIN D DEFICIENCY: ICD-10-CM

## 2018-03-06 DIAGNOSIS — E78.00 HYPERCHOLESTEREMIA: ICD-10-CM

## 2018-03-06 DIAGNOSIS — Z00.00 PHYSICAL EXAM: ICD-10-CM

## 2018-03-06 PROCEDURE — 99213 OFFICE O/P EST LOW 20 MIN: CPT | Performed by: FAMILY MEDICINE

## 2018-03-06 NOTE — PROGRESS NOTES
Assessment/Plan: patient here today for two week follow up to review BW     No problem-specific Assessment & Plan notes found for this encounter  1  Heart burn     2  Hypercholesteremia     3  Malignant neoplasm of esophagus, unspecified location ([address] )     4  Vitamin D deficiency            There are no diagnoses linked to this encounter  Subjective:      Patient ID: Katja Beverly is a 77 y o  male  Follow up  Review labs, ankle swelling comes and goes  Appetite good  Labs constant  The following portions of the patient's history were reviewed and updated as appropriate: allergies, current medications, past family history, past medical history, past social history, past surgical history and problem list     Review of Systems   HENT: Negative  Respiratory: Negative  Cardiovascular: Negative for leg swelling  Ankle / pedal edema  Gastrointestinal: Negative  Genitourinary: Negative  Musculoskeletal: Negative  Neurological: Negative  Psychiatric/Behavioral: Negative  Objective:      /62 (BP Location: Right arm, Patient Position: Sitting, Cuff Size: Standard)   Pulse 69   Temp 97 7 °F (36 5 °C) (Oral)   Ht 5' 9" (1 753 m)   Wt 68 9 kg (152 lb)   SpO2 93%   BMI 22 45 kg/m²          Physical Exam   Constitutional: He is oriented to person, place, and time  He appears well-developed and well-nourished  HENT:   Head: Normocephalic and atraumatic  Right Ear: External ear normal    Left Ear: External ear normal    Neck: Normal range of motion  Neck supple  Cardiovascular: Normal rate and regular rhythm  Pulmonary/Chest: Effort normal and breath sounds normal    Neurological: He is alert and oriented to person, place, and time  Skin: Skin is warm and dry  Psychiatric: He has a normal mood and affect   His behavior is normal  Judgment and thought content normal

## 2018-04-12 ENCOUNTER — LAB REQUISITION (OUTPATIENT)
Dept: LAB | Facility: HOSPITAL | Age: 78
End: 2018-04-12
Payer: MEDICARE

## 2018-04-12 DIAGNOSIS — E46 PROTEIN-CALORIE MALNUTRITION (HCC): ICD-10-CM

## 2018-04-12 DIAGNOSIS — R60.9 EDEMA: ICD-10-CM

## 2018-04-12 DIAGNOSIS — K21.9 GASTRO-ESOPHAGEAL REFLUX DISEASE WITHOUT ESOPHAGITIS: ICD-10-CM

## 2018-04-12 DIAGNOSIS — C23 MALIGNANT NEOPLASM OF GALLBLADDER (HCC): ICD-10-CM

## 2018-04-12 DIAGNOSIS — C7A.1 MALIGNANT POORLY DIFFERENTIATED NEUROENDOCRINE TUMORS (HCC): ICD-10-CM

## 2018-04-12 LAB
AFP-TM SERPL-MCNC: 1.3 NG/ML (ref 0.5–8)
NT-PROBNP SERPL-MCNC: 2314 PG/ML

## 2018-04-12 PROCEDURE — 83880 ASSAY OF NATRIURETIC PEPTIDE: CPT | Performed by: NURSE PRACTITIONER

## 2018-04-12 PROCEDURE — 82105 ALPHA-FETOPROTEIN SERUM: CPT | Performed by: NURSE PRACTITIONER

## 2018-04-12 PROCEDURE — 83519 RIA NONANTIBODY: CPT | Performed by: NURSE PRACTITIONER

## 2018-04-19 LAB — NSE SERPL IA-MCNC: 3.3 NG/ML (ref 0–12.5)

## 2018-05-15 PROBLEM — C23 MALIGNANT NEOPLASM OF GALLBLADDER (HCC): Status: ACTIVE | Noted: 2018-05-15

## 2018-05-17 ENCOUNTER — APPOINTMENT (OUTPATIENT)
Dept: RADIATION ONCOLOGY | Facility: HOSPITAL | Age: 78
End: 2018-05-17
Attending: RADIOLOGY
Payer: MEDICARE

## 2018-05-17 ENCOUNTER — RADIATION ONCOLOGY FOLLOW-UP (OUTPATIENT)
Dept: RADIATION ONCOLOGY | Facility: HOSPITAL | Age: 78
End: 2018-05-17

## 2018-05-17 VITALS
TEMPERATURE: 97.4 F | OXYGEN SATURATION: 100 % | DIASTOLIC BLOOD PRESSURE: 60 MMHG | BODY MASS INDEX: 23.82 KG/M2 | SYSTOLIC BLOOD PRESSURE: 90 MMHG | HEART RATE: 66 BPM | WEIGHT: 160.8 LBS | HEIGHT: 69 IN | RESPIRATION RATE: 16 BRPM

## 2018-05-17 DIAGNOSIS — C15.9 CARCINOMA OF ESOPHAGUS (HCC): Primary | ICD-10-CM

## 2018-05-17 PROCEDURE — 99215 OFFICE O/P EST HI 40 MIN: CPT | Performed by: RADIOLOGY

## 2018-05-17 RX ORDER — POTASSIUM CHLORIDE 750 MG/1
10 TABLET, FILM COATED, EXTENDED RELEASE ORAL AS NEEDED
COMMUNITY
Start: 2018-04-26 | End: 2020-11-02

## 2018-05-17 RX ORDER — FUROSEMIDE 20 MG/1
20 TABLET ORAL AS NEEDED
COMMUNITY
Start: 2018-04-26 | End: 2020-11-02

## 2018-05-17 NOTE — PROGRESS NOTES
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  The tumor is histologically dissimilar to the patient's previously diagnosed adenocarcinoma of the gallbladder         11/24/2014 - 11/26/2014 Chemotherapy     Etoposide with carboplatin         12/17/2014 - 1/20/2015 Radiation     Distal esophagus to 4140 cGy         12/17/2014 - 1/20/2015 Chemotherapy     Concurrent weekly Taxol and carboplatin  1/13/15 had reaction to carboplatin and it was stopped  Taxol only aftyer that         3/4/2015 Surgery     Esophagectomy by Dr Shanice Peralta         6/25/2015 - 7/29/2015 Chemotherapy     Temodar           Malignant neoplasm of gallbladder ([facility] )    5/17/2005 Initial Diagnosis     Malignant neoplasm of gallbladder ([facility] )         7/2005 Surgery     Cholecystectomy followed by RFA         8/1/2005 - 9/2/2005 Radiation     Right abdomen/gallbladder area to 4500 cGy         8/1/2005 - 9/6/2005 Chemotherapy     Continuous infusion 5FU with radiation         4/2007 Progression     Recurrent disease         5/30/2007 - 1/30/2008 Chemotherapy     Gemcitabine/cisplatin/carboplatin         2/21/2008 Surgery     Resection of liver lesions and hannah hepatic mass         3/26/2008 - 8/20/2008 Chemotherapy     Gemcitabine/carboplatin        Clinical Trial: no    Interval History  Last seen on 11/16/17 by Dr Danielle Das    1/16/18 Follow up with Dr Shanice ROSE from both cancers    Q6 week F/U with Med Onc       Screening  Tobacco  Current tobacco user: yes  If yes, brief counseling provided: NA    Hypertension  Hypertension screening performed: yes  Normotensive:  yes  If no, referred to PCP: n/a    Depression Screening  Screened for depression using PHQ-2: yes    Screened for depression using PHQ-9:  no  Screening positive or negative:  negative  If score >4, was any of the following actions taken?    Additional evaluation for depression, suicide risk assesment, referral to PCP or psychiatry, medication started:  n/a    Advanced Care Planning for Patients >65 years  Advanced Care Planning Discussed:  yes  Patient named surrogate decision maker or care plan in chart: yes      Health Maintenance   Topic Date Due    SLP PLAN OF CARE  1940    COLONOSCOPY  1940    DTaP,Tdap,and Td Vaccines (1 - Tdap) 03/09/2013    GLAUCOMA SCREENING 67+ YR  12/17/2014    HEMOGLOBIN A1C  06/09/2015    INFLUENZA VACCINE  09/01/2018    LIPID PANEL  12/20/2018    Fall Risk  02/20/2019    Depression Screening PHQ-9  02/20/2019    PNEUMOCOCCAL POLYSACCHARIDE VACCINE AGE 72 AND OVER  Completed       Patient Active Problem List   Diagnosis    Carcinoma of esophagus ([place] [address] )    Malignant neoplasm of gallbladder ([place] [address] )     Past Medical History:   Diagnosis Date    Abnormal EKG     [facility]     Benign essential hypertension     LAST ASSESSED: [id number]    Diabetes mellitus ([facility] )     Diabetic hypoglycemia ([place] [address] )     LAST ASSESSED: [id number]    Dry skin     LAST ASSESSED: [id number]    Esophageal cancer ([facility] )     H/O cancer of gall bladder     History of chemotherapy     History of external beam radiation therapy     Hypotension     Hypoxia     Liver cancer ([facility] )     Myocardial infarction ([place] [address] )     Obesity     LAST ASSESSED: [id number]    Puncture wound of thumb     LAST ASSESSED: [id number]    Superficial injury of foot     LAST ASSESSED: [id number]    Tachycardia     Type 2 diabetes mellitus ([place] [address] )     LAST ASSESSED: [id number]    Vitamin D deficiency     LAST ASSESSED: [id number]     Past Surgical History:   Procedure Laterality Date    ANGIOPLASTY      PREVIOUS LASER ANGIOPLASTY WITH STENT PLACEMENT    CATARACT EXTRACTION W/  INTRAOCULAR LENS IMPLANT Bilateral     CORONARY ANGIOPLASTY WITH STENT PLACEMENT      PLACEMENT OF STENT 1    GALLBLADDER SURGERY      HIP SURGERY      TONSILLECTOMY       Family History   Problem Relation Age of Onset    Leukemia Mother     Lymphoma Son     Lymphoma Son      Social History     Social History    Marital status: /Civil Union     Spouse name: Demetra Cruz Number of children: N/A    Years of education: N/A     Occupational History    Retired      Social History Main Topics    Smoking status: Former Smoker     Quit date: 2/15/1975    Smokeless tobacco: Never Used      Comment: FORMER SMOKER - SMOKED 2 PPD FOR 20 YRS AND QUIT 2/15/1975 AS PER [employer]    Alcohol use No      Comment: (HISTORY) RARELY AS PER [employer]    Drug use: No    Sexual activity: Not on file     Other Topics Concern    Not on file     Social History Narrative    No narrative on file       Current Outpatient Prescriptions:     allopurinol (ZYLOPRIM) 100 mg tablet, Take 1 tablet by mouth 2 (two) times a day, Disp: , Rfl:     aspirin 81 MG tablet, Take 2 tablets by mouth, Disp: , Rfl:     atorvastatin (LIPITOR) 40 mg tablet, , Disp: , Rfl:     cyanocobalamin (VITAMIN B-12) 100 mcg tablet, Take by mouth, Disp: , Rfl:     ergocalciferol (VITAMIN D2) 50,000 units, , Disp: , Rfl:     Ferrous Sulfate (IRON) 325 (65 Fe) MG TABS, Take 1 tablet by mouth daily, Disp: , Rfl:     finasteride (PROSCAR) 5 mg tablet, Take 1 tablet by mouth, Disp: , Rfl:     furosemide (LASIX) 20 mg tablet, , Disp: , Rfl:     KLOR-CON 10 10 MEQ tablet, , Disp: , Rfl:     Magnesium Oxide 400 MG CAPS, Take 2 capsules by mouth daily, Disp: , Rfl:     polyethylene glycol-propylene glycol (SYSTANE ULTRA) 0 4-0 3 %, Apply 1 drop to eye daily, Disp: , Rfl:     terazosin (HYTRIN) 1 mg capsule, Take 1 capsule by mouth, Disp: , Rfl:   Allergies   Allergen Reactions    Carboplatin Facial Swelling and Rash     Category: Allergy; Review of Systems:  Review of Systems   Constitutional: Negative  HENT: Negative  Eyes: Negative  Respiratory: Negative  Cardiovascular: Positive for leg swelling  Gastrointestinal: Positive for nausea (Occasional due to reflux)  Endocrine: Negative  Genitourinary: Negative  Musculoskeletal: Negative  Skin: Negative  Allergic/Immunologic: Negative  Neurological: Positive for dizziness and light-headedness (Occasional)  Hematological: Bruises/bleeds easily  Psychiatric/Behavioral: Negative  Vitals:    05/17/18 0834   BP: 90/60   BP Location: Left arm   Patient Position: Sitting   Cuff Size: Standard   Pulse: 66   Resp: 16   Temp: (!) 97 4 °F (36 3 °C)   TempSrc: Oral   SpO2: 100%   Weight: 72 9 kg (160 lb 12 8 oz)   Height: 5' 9" (1 753 m)       Pain Score: 0-No pain    Imaging:No results found

## 2018-05-17 NOTE — PROGRESS NOTES
Follow-up - Radiation Oncology   Khushi Son 1940 77 y o  male [id number]      History of Present Illness   Cancer Staging  No matching staging information was found for the patient  Khushi Son is a 77y o  year old male with a history of with a distant history of gallbladder carcinoma in 2005 for which he underwent resection followed by radiation/chemotherapy  In 2007 he developed liver metastasis and underwent resection and chemotherapy      In the spring of 2014 he developed fevers and decreased appetite  He was seen and evaluated with workup returning negative  His appetite continued to decline and he began developing pain intermittently after eating  He underwent CT scan of the abdomen and pelvis on 11/ 6/14  There was evidence of abnormal mucosal thickening in the distal esophagus extending to the GE junction      EGD on 11/11/14 revealed tumor in the cardia  There was a 3 cm ulcerated tumor occupying 75-99% of the circumference of the GE junction  There was a 2 cm polypoid tumor at the 30 cm Donny  Biopsy returned consistent with invasive poorly differentiated non-small cell carcinoma with neuroendocrine differentiation  HER-2 was analyzed and returned negative      On 11/17/14 he underwent endoscopic ultrasound  There was noted to be tumor found in the cardia as well as esophageal tumor with extension into the GE junction and gastric cardia  The mass in the lower third of the esophagus with stage is a UT3 N0 mass in the lower third of the esophagus at 36 CM UT2 N0      PET/CT from 11/13/14 revealed a mass in the distal esophagus with intense uptake extending into the cardia  There was no evidence of carlos or metastatic uptake      He completed a course of radiation/chemotherapy for soft distal esophageal carcinoma on 1/20/15  Interval History:  Last seen on 11/16/17 by Dr Alejandro Enamorado     1/16/18 Follow up with Dr Sherman ROSE from both cancers     Q6 week F/U with Med Onc  Screening  Tobacco  Current tobacco user: yes  If yes, brief counseling provided: NA     Hypertension  Hypertension screening performed: yes  Normotensive:  yes  If no, referred to PCP: n/a     Depression Screening  Screened for depression using PHQ-2: yes     Screened for depression using PHQ-9:  no  Screening positive or negative:  negative  If score >4, was any of the following actions taken? Additional evaluation for depression, suicide risk assesment, referral to PCP or psychiatry, medication started:  n/a     Advanced Care Planning for Patients >65 years  Advanced Care Planning Discussed:  yes  Patient named surrogate decision maker or care plan in chart: yes       Historical Information      Carcinoma [facility] esophagus ([facility] )    11/11/2014 Initial Diagnosis     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  The tumor is histologically dissimilar to the patient's previously diagnosed adenocarcinoma of the gallbladder         11/24/2014 - 11/26/2014 Chemotherapy     Etoposide with carboplatin         12/17/2014 - 1/20/2015 Radiation     Distal esophagus to 4140 cGy         12/17/2014 - 1/20/2015 Chemotherapy     Concurrent weekly Taxol and carboplatin  1/13/15 had reaction to carboplatin and it was stopped    Taxol only aftyer that         3/4/2015 Surgery     Esophagectomy by Dr Amarilis Enamorado         6/25/2015 - 7/29/2015 Chemotherapy     Temodar           Malignant [facility] gallbladder ([facility] )    5/17/2005 Initial Diagnosis     Malignant neoplasm of gallbladder ([facility] )         7/2005 Surgery     Cholecystectomy followed by RFA         8/1/2005 - 9/2/2005 Radiation     Right abdomen/gallbladder area to 4500 cGy         8/1/2005 - 9/6/2005 Chemotherapy     Continuous infusion 5FU with radiation         4/2007 Progression     Recurrent disease         5/30/2007 - 1/30/2008 Chemotherapy     Gemcitabine/cisplatin/carboplatin         2/21/2008 Surgery     Resection of liver lesions and hannah hepatic mass         3/26/2008 - 8/20/2008 Chemotherapy     Gemcitabine/carboplatin            Past Medical History:   Diagnosis Date    Abnormal EKG     Acute myocardial infarction ([facility] )     Benign essential hypertension     LAST ASSESSED: [id number]    [facility]     Diabetic hypoglycemia ([place] [address] )     LAST ASSESSED: [id number]    Dry skin     LAST ASSESSED: [id number]    Esophageal cancer ([place] [address] )     H/O cancer of gall bladder     History of chemotherapy     History of external beam radiation therapy     Hypotension     Hypoxia     Liver cancer (HCC)     [facility]     Obesity     LAST ASSESSED: [id number]    Puncture wound of thumb     LAST ASSESSED: [id number]    Superficial injury of foot     LAST ASSESSED: [id number]    Tachycardia     Type 2 diabetes mellitus (HCC)     LAST ASSESSED: [id number]    Vitamin D deficiency     LAST ASSESSED: [id number]     Past Surgical History:   Procedure Laterality Date    ANGIOPLASTY      PREVIOUS LASER ANGIOPLASTY WITH STENT PLACEMENT    CATARACT EXTRACTION W/  INTRAOCULAR LENS IMPLANT Bilateral     CORONARY ANGIOPLASTY WITH STENT PLACEMENT      PLACEMENT OF STENT 1    GALLBLADDER SURGERY      HIP SURGERY      TONSILLECTOMY         Social History   History   Alcohol Use No     Comment: (HISTORY) RARELY AS PER [employer]     History   Drug Use No     History   Smoking Status    Former Smoker    Quit date: 2/15/1975   Smokeless Tobacco    Never Used     Comment: FORMER SMOKER - SMOKED 2 PPD FOR 20 YRS AND QUIT 2/15/1975 AS PER [employer]         Meds/Allergies     Current Outpatient Prescriptions:     allopurinol (ZYLOPRIM) 100 mg tablet, Take 1 tablet by mouth 2 (two) times a day, Disp: , Rfl:     aspirin 81 MG tablet, Take 2 tablets by mouth, Disp: , Rfl:     atorvastatin (LIPITOR) 40 mg tablet, , Disp: , Rfl:     cyanocobalamin (VITAMIN B-12) 100 mcg tablet, Take by mouth, Disp: , Rfl:     ergocalciferol (VITAMIN D2) 50,000 units, , Disp: , Rfl:     Ferrous Sulfate (IRON) 325 (65 Fe) MG TABS, Take 1 tablet by mouth daily, Disp: , Rfl:     finasteride (PROSCAR) 5 mg tablet, Take 1 tablet by mouth, Disp: , Rfl:     furosemide (LASIX) 20 mg tablet, , Disp: , Rfl:     KLOR-CON 10 10 MEQ tablet, , Disp: , Rfl:     Magnesium Oxide 400 MG CAPS, Take 2 capsules by mouth daily, Disp: , Rfl:     polyethylene glycol-propylene glycol (SYSTANE ULTRA) 0 4-0 3 %, Apply 1 drop to eye daily, Disp: , Rfl:     terazosin (HYTRIN) 1 mg capsule, Take 1 capsule by mouth, Disp: , Rfl:   Allergies   Allergen Reactions    Carboplatin Facial Swelling and Rash     Category: Allergy; Review of Systems   Constitutional: Negative  HENT: Negative  Eyes: Negative  Respiratory: Negative  Cardiovascular: Positive for leg swelling  Gastrointestinal: Positive for nausea (Occasional due to reflux)  Endocrine: Negative  Genitourinary: Negative  Musculoskeletal: Negative  Skin: Negative  Allergic/Immunologic: Negative  Neurological: Positive for dizziness and light-headedness (Occasional)  Hematological: Bruises/bleeds easily  Psychiatric/Behavioral: Negative  OBJECTIVE:   BP 90/60 (BP Location: Left arm, Patient Position: Sitting, Cuff Size: Standard)   Pulse 66   Temp (!) 97 4 °F (36 3 °C) (Oral)   Resp 16   Ht 5' 9" (1 753 m)   Wt 72 9 kg (160 lb 12 8 oz)   SpO2 100%   BMI 23 75 kg/m²   Pain Assessment:  0  ECOG/Zubrod/WHO: 0 - Asymptomatic    Physical Exam   Constitutional: He appears well-developed  HENT:   Head: Normocephalic  Mouth/Throat: Oropharynx is clear and moist    No supraclavicular adenopathy palpable   Eyes: EOM are normal  Pupils are equal, round, and reactive to light  Neck: Normal range of motion  Neck supple     Cardiovascular: Normal rate, regular rhythm and normal heart sounds  Pulmonary/Chest: Effort normal and breath sounds normal  He has no wheezes  He exhibits no tenderness  Abdominal: Soft  Bowel sounds are normal  He exhibits no distension and no mass  There is no tenderness  Musculoskeletal: Normal range of motion  He exhibits no edema  Lymphadenopathy:     He has no cervical adenopathy  Neurological: He is alert  No cranial nerve deficit  Coordination normal    Skin: Skin is warm  No rash noted  No erythema  Psychiatric: He has a normal mood and affect  His behavior is normal             RESULTS    Lab Results: No results found for this or any previous visit (from the past 672 hour(s))  Imaging Studies:No results found  Assessment/Plan:  Orders Placed This Encounter   Procedures    CT chest abdomen pelvis w contrast    BUN    Creatinine, serum        Geoff Burgess is a 77y o  year old male with esophageal carcinoma 3 years status post neoadjuvant radiation therapy followed by esophagectomy  He has no clinical evidence of recurrence  He has not had recent imaging and I have ordered CT scan for surveillance  He also is 13 years post radiation therapy for gallbladder carcinoma  Also [place]  He will return for follow-up visit in 1 year  Bridgette Cisneros MD  5/17/2018,9:52 AM    Portions of the record may have been created with voice recognition software   Occasional wrong word or "sound a like" substitutions may have occurred due to the inherent limitations of voice recognition software   Read the chart carefully and recognize, using context, where substitutions have occurred

## 2018-06-18 ENCOUNTER — OFFICE VISIT (OUTPATIENT)
Dept: FAMILY MEDICINE CLINIC | Facility: CLINIC | Age: 78
End: 2018-06-18
Payer: MEDICARE

## 2018-06-18 VITALS
DIASTOLIC BLOOD PRESSURE: 62 MMHG | OXYGEN SATURATION: 98 % | HEIGHT: 69 IN | SYSTOLIC BLOOD PRESSURE: 118 MMHG | BODY MASS INDEX: 22.81 KG/M2 | WEIGHT: 154 LBS | TEMPERATURE: 98.4 F | HEART RATE: 81 BPM

## 2018-06-18 DIAGNOSIS — F43.21 GRIEVING: ICD-10-CM

## 2018-06-18 DIAGNOSIS — T14.8XXA ABRASION: Primary | ICD-10-CM

## 2018-06-18 PROCEDURE — 99214 OFFICE O/P EST MOD 30 MIN: CPT | Performed by: FAMILY MEDICINE

## 2018-06-18 NOTE — PATIENT INSTRUCTIONS
Abrasion loose covering  Tic wasn't on long enough  Hydration with water '  Discussed keeping busy, positive thinking, read books  25 minutes spent with patient

## 2018-06-18 NOTE — PROGRESS NOTES
Assessment/Plan:patient here today for follow up for diabetes and scrap on right wrist   Pt stated that he found a tick on himself     No problem-specific Assessment & Plan notes found for this encounter  Diagnoses and all orders for this visit:    Abrasion    Grieving          Subjective:      Patient ID: Ebony Fam is a 77 y o  male  Follow up  Wife  last Monday  Patient states he is doing OK  Recent labs, fasting sugar little up  Found Tic on abdomen, on < 24 hours  Gets little dizzy bending over and standing up (orthostatic symptoms)  On Hytrin and Proscar for years  Will have patient hold the Hytrin  The following portions of the patient's history were reviewed and updated as appropriate: allergies, current medications, past family history, past medical history, past social history, past surgical history and problem list     Review of Systems   Constitutional: Negative  HENT: Negative  Eyes: Negative  Respiratory: Negative  Cardiovascular: Negative  Gastrointestinal: Negative  Genitourinary: Negative  Musculoskeletal: Negative  Skin: Negative  Neurological: Negative  Psychiatric/Behavioral: Negative  Objective:      /62 (BP Location: Left arm, Patient Position: Sitting, Cuff Size: Standard)   Pulse 81   Temp 98 4 °F (36 9 °C) (Oral)   Ht 5' 9" (1 753 m)   Wt 69 9 kg (154 lb)   SpO2 98%   BMI 22 74 kg/m²          Physical Exam   Constitutional: He is oriented to person, place, and time  He appears well-developed and well-nourished  HENT:   Head: Normocephalic and atraumatic  Right Ear: External ear normal    Left Ear: External ear normal    Nose: Nose normal    Mouth/Throat: Oropharynx is clear and moist    Eyes: Conjunctivae and EOM are normal  Pupils are equal, round, and reactive to light  Neck: Normal range of motion  Neck supple  Cardiovascular: Normal rate, regular rhythm, normal heart sounds and intact distal pulses  Pulmonary/Chest: Effort normal and breath sounds normal    Abdominal: Soft  Bowel sounds are normal    Neurological: He is alert and oriented to person, place, and time  He has normal reflexes  Skin: Skin is warm and dry  Psychiatric: He has a normal mood and affect   His behavior is normal  Judgment and thought content normal

## 2018-06-20 ENCOUNTER — OFFICE VISIT (OUTPATIENT)
Dept: FAMILY MEDICINE CLINIC | Facility: CLINIC | Age: 78
End: 2018-06-20
Payer: MEDICARE

## 2018-06-20 VITALS
WEIGHT: 154.2 LBS | HEIGHT: 69 IN | BODY MASS INDEX: 22.84 KG/M2 | SYSTOLIC BLOOD PRESSURE: 112 MMHG | OXYGEN SATURATION: 99 % | TEMPERATURE: 97.8 F | DIASTOLIC BLOOD PRESSURE: 78 MMHG | HEART RATE: 71 BPM

## 2018-06-20 DIAGNOSIS — N40.0 ENLARGED PROSTATE: ICD-10-CM

## 2018-06-20 DIAGNOSIS — L82.1 SEBORRHEIC KERATOSES: Primary | ICD-10-CM

## 2018-06-20 PROCEDURE — 99213 OFFICE O/P EST LOW 20 MIN: CPT | Performed by: FAMILY MEDICINE

## 2018-06-20 RX ORDER — FINASTERIDE 5 MG/1
5 TABLET, FILM COATED ORAL DAILY
Qty: 90 TABLET | Refills: 1 | Status: SHIPPED | OUTPATIENT
Start: 2018-06-20 | End: 2019-02-05 | Stop reason: SDUPTHER

## 2018-06-20 NOTE — PROGRESS NOTES
Assessment/Plan: patient here today for tick bite on back   Pt stated that he woke up soaked in sweat twice this week     No problem-specific Assessment & Plan notes found for this encounter  Diagnoses and all orders for this visit:    Seborrheic keratoses    Enlarged prostate  -     finasteride (PROSCAR) 5 mg tablet; Take 1 tablet (5 mg total) by mouth daily          Subjective:      Patient ID: Sayda Gibson is a 77 y o  male  Lesion some type left upper posterior thorax, noticed this am   Worried may be a Tic  The following portions of the patient's history were reviewed and updated as appropriate: allergies, current medications, past family history, past medical history, past social history, past surgical history and problem list     Review of Systems   Constitutional: Negative  Skin:        Lesion left upper posterior thorax           Objective:      /78 (BP Location: Left arm, Patient Position: Sitting, Cuff Size: Standard)   Pulse 71   Temp 97 8 °F (36 6 °C) (Oral)   Ht 5' 9" (1 753 m)   Wt 69 9 kg (154 lb 3 2 oz)   SpO2 99%   BMI 22 77 kg/m²          Physical Exam   Skin:   SK

## 2018-06-27 ENCOUNTER — APPOINTMENT (OUTPATIENT)
Dept: LAB | Facility: MEDICAL CENTER | Age: 78
End: 2018-06-27
Payer: MEDICARE

## 2018-06-27 DIAGNOSIS — R60.0 EDEMA OF LOWER EXTREMITY: ICD-10-CM

## 2018-06-27 DIAGNOSIS — C15.9 CARCINOMA OF ESOPHAGUS (HCC): ICD-10-CM

## 2018-06-27 LAB
ALBUMIN SERPL BCP-MCNC: 2.9 G/DL (ref 3.5–5)
ALP SERPL-CCNC: 65 U/L (ref 46–116)
ALT SERPL W P-5'-P-CCNC: 24 U/L (ref 12–78)
ANION GAP SERPL CALCULATED.3IONS-SCNC: 6 MMOL/L (ref 4–13)
AST SERPL W P-5'-P-CCNC: 25 U/L (ref 5–45)
BILIRUB DIRECT SERPL-MCNC: 0.18 MG/DL (ref 0–0.2)
BILIRUB SERPL-MCNC: 0.56 MG/DL (ref 0.2–1)
BUN SERPL-MCNC: 20 MG/DL (ref 5–25)
CALCIUM SERPL-MCNC: 9 MG/DL (ref 8.3–10.1)
CHLORIDE SERPL-SCNC: 106 MMOL/L (ref 100–108)
CO2 SERPL-SCNC: 27 MMOL/L (ref 21–32)
CREAT SERPL-MCNC: 1.4 MG/DL (ref 0.6–1.3)
ERYTHROCYTE [DISTWIDTH] IN BLOOD BY AUTOMATED COUNT: 14.4 % (ref 11.6–15.1)
GFR SERPL CREATININE-BSD FRML MDRD: 47 ML/MIN/1.73SQ M
GLUCOSE P FAST SERPL-MCNC: 130 MG/DL (ref 65–99)
HCT VFR BLD AUTO: 34.7 % (ref 36.5–49.3)
HGB BLD-MCNC: 10.8 G/DL (ref 12–17)
MCH RBC QN AUTO: 30 PG (ref 26.8–34.3)
MCHC RBC AUTO-ENTMCNC: 31.1 G/DL (ref 31.4–37.4)
MCV RBC AUTO: 96 FL (ref 82–98)
PLATELET # BLD AUTO: 158 THOUSANDS/UL (ref 149–390)
PMV BLD AUTO: 11 FL (ref 8.9–12.7)
POTASSIUM SERPL-SCNC: 4.5 MMOL/L (ref 3.5–5.3)
PROT SERPL-MCNC: 7.5 G/DL (ref 6.4–8.2)
RBC # BLD AUTO: 3.6 MILLION/UL (ref 3.88–5.62)
SODIUM SERPL-SCNC: 139 MMOL/L (ref 136–145)
TSH SERPL DL<=0.05 MIU/L-ACNC: 2.57 UIU/ML (ref 0.36–3.74)
WBC # BLD AUTO: 6.24 THOUSAND/UL (ref 4.31–10.16)

## 2018-06-27 PROCEDURE — 85027 COMPLETE CBC AUTOMATED: CPT

## 2018-06-27 PROCEDURE — 80048 BASIC METABOLIC PNL TOTAL CA: CPT

## 2018-06-27 PROCEDURE — 80076 HEPATIC FUNCTION PANEL: CPT

## 2018-06-27 PROCEDURE — 36415 COLL VENOUS BLD VENIPUNCTURE: CPT

## 2018-06-27 PROCEDURE — 84443 ASSAY THYROID STIM HORMONE: CPT

## 2018-07-10 ENCOUNTER — HOSPITAL ENCOUNTER (OUTPATIENT)
Dept: CT IMAGING | Facility: HOSPITAL | Age: 78
Discharge: HOME | End: 2018-07-10
Attending: RADIOLOGY
Payer: MEDICARE

## 2018-07-10 DIAGNOSIS — C15.9 CARCINOMA OF ESOPHAGUS (HCC): ICD-10-CM

## 2018-07-10 PROCEDURE — 74177 CT ABD & PELVIS W/CONTRAST: CPT

## 2018-07-10 PROCEDURE — 71260 CT THORAX DX C+: CPT

## 2018-07-10 RX ADMIN — IOHEXOL 100 ML: 350 INJECTION, SOLUTION INTRAVENOUS at 07:48

## 2018-07-16 PROBLEM — E11.9 TYPE 2 DIABETES MELLITUS (HCC): Status: ACTIVE | Noted: 2018-07-16

## 2018-07-16 PROBLEM — I21.9 ACUTE MYOCARDIAL INFARCTION (HCC): Status: RESOLVED | Noted: 2018-07-16 | Resolved: 2018-07-16

## 2018-07-16 PROBLEM — I10 BENIGN ESSENTIAL HYPERTENSION: Status: ACTIVE | Noted: 2018-07-16

## 2018-07-17 ENCOUNTER — OFFICE VISIT (OUTPATIENT)
Dept: SURGICAL ONCOLOGY | Facility: CLINIC | Age: 78
End: 2018-07-17
Payer: MEDICARE

## 2018-07-17 VITALS
HEART RATE: 72 BPM | BODY MASS INDEX: 22.22 KG/M2 | WEIGHT: 150 LBS | RESPIRATION RATE: 12 BRPM | HEIGHT: 69 IN | SYSTOLIC BLOOD PRESSURE: 118 MMHG | TEMPERATURE: 97.8 F | DIASTOLIC BLOOD PRESSURE: 70 MMHG

## 2018-07-17 DIAGNOSIS — C15.9 CARCINOMA OF ESOPHAGUS (HCC): Primary | ICD-10-CM

## 2018-07-17 PROCEDURE — 99214 OFFICE O/P EST MOD 30 MIN: CPT | Performed by: SURGERY

## 2018-07-17 NOTE — PROGRESS NOTES
Surgical Oncology Follow Up       [address] [facility],[address] [place] [address]  CANCER CARE [facility] SURGICAL ONCOLOGY [address] Chalino  1940  [id number]  [address] [facility],[address] [place] [address]  CANCER [facility] SURGICAL ONCOLOGY [place] [id number]    Diagnoses and all orders for this visit:    Carcinoma of esophagus [facility])        Chief Complaint   Patient presents with    Follow-up     6 month follow up       Return in about 6 months (around 1/17/2019)  Carcinoma of esophagus ([address] )    11/11/2014 Initial Diagnosis     Carcinoma of esophagus ([address] )         11/11/2014 Biopsy     GE junction biopsy and distal esophageal biopsy - 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  The tumor is histologically dissimilar to the patient's previously diagnosed adenocarcinoma of the gallbladder         11/24/2014 - 11/26/2014 Chemotherapy     Etoposide with carboplatin         12/17/2014 - 1/20/2015 Radiation     Distal esophagus to 4140 cGy         12/17/2014 - 1/20/2015 Chemotherapy     Concurrent weekly Taxol and carboplatin  1/13/15 had reaction to carboplatin and it was stopped    Taxol only aftyer that         3/4/2015 Surgery     Esophagectomy by Dr Shanice Peralta         6/25/2015 - 7/29/2015 Chemotherapy     Temodar           Malignant neoplasm of gallbladder ([facility] )    5/17/2005 Initial Diagnosis     Malignant neoplasm of gallbladder ([address] )         7/2005 Surgery     Cholecystectomy followed by RFA         8/1/2005 - 9/2/2005 Radiation     Right abdomen/gallbladder area to 4500 cGy         8/1/2005 - 9/6/2005 Chemotherapy     Continuous infusion 5FU with radiation         4/2007 Progression     Recurrent disease         5/30/2007 - 1/30/2008 Chemotherapy     Gemcitabine/cisplatin/carboplatin         2/21/2008 Surgery     Resection of liver lesions and hannah hepatic mass         3/26/2008 - 8/20/2008 Chemotherapy Gemcitabine/carboplatin            Diagnosis and Staging: Gallbladder cancer: T2N0M0   initially treated 2005  Recurrence treated February 2008  [id number] 30cm and 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  [id number] esophageal cancer March 2015   Treatment History: Radical cholecystectomy with liver resection 2005  Recurrence treated with resection of segment 5 lesion in 2008  Carboplatin and etoposide November 2014 for his esophageal carcinoma  Radiation completed January 20, 2015  Transhiatal esophagectomy, March 2015  Xeloda/Temodar   Current Therapy: Observation   Disease Status: MILTON     History of Present Illness:   Patient returns after his esophagectomy  He is doing well at this time with no complaints  He has lost some weight recently secondary to the stress of dealing with the loss of his wife 1 month ago  No nausea or vomiting  His CT from July 10, 2018 was stable  I personally reviewed his films  Review of Systems  Complete ROS Surg Onc:   Complete ROS Surg Onc:   Constitutional: The patient denies new or recent history of general fatigue,no change in appetite  Recent weight loss  Eyes: No complaints of visual problems, no scleral icterus  ENT: no complaints of ear pain, no hoarseness, no difficulty swallowing,  no tinnitus and no new masses in head, oral cavity, or neck  Cardiovascular: No complaints of chest pain, no palpitations, no ankle edema  Respiratory: No complaints of shortness of breath, no cough  Gastrointestinal: No complaints of jaundice, no bloody stools, no pale stools  Genitourinary: No complaints of dysuria, no hematuria, no nocturia, no frequent urination, no urethral discharge  Musculoskeletal: No complaints of weakness, paralysis, joint stiffness or arthralgias  Integumentary: No complaints of rash, no new lesions  Neurological: No complaints of convulsions, no seizures, no dizziness  Hematologic/Lymphatic: No complaints of easy bruising  Endocrine:  No hot or cold intolerance  No polydipsia, polyphagia, or polyuria  Allergy/immunology:  No environmental allergies  No food allergies  Not immunocompromised  Skin:  No pallor or rash  No wound  Patient Active Problem List   Diagnosis    Carcinoma of esophagus ([place] [address] )    Malignant neoplasm of gallbladder (HCC)    Type 2 diabetes mellitus ([place] [address] )    Benign essential hypertension     Past Medical History:   Diagnosis Date    Abnormal EKG     Acute myocardial infarction ([place] [address] )     Benign essential hypertension     LAST ASSESSED: [id number]    Dry skin     LAST ASSESSED: [id number]    History of chemotherapy     History of external beam radiation therapy     Hypotension     Hypoxia     Liver cancer ([place] [address] )     Obesity     LAST ASSESSED: [id number]    Puncture wound of thumb     LAST ASSESSED: [id number]    Superficial injury of foot     LAST ASSESSED: [id number]    Tachycardia     Type 2 diabetes mellitus (HCC)     LAST ASSESSED: [id number]    Vitamin D deficiency     LAST ASSESSED: [id number]     Past Surgical History:   Procedure Laterality Date    ANGIOPLASTY      PREVIOUS LASER ANGIOPLASTY WITH STENT PLACEMENT    CATARACT EXTRACTION W/  INTRAOCULAR LENS IMPLANT Bilateral     CHOLECYSTECTOMY      CORONARY ANGIOPLASTY WITH STENT PLACEMENT      PLACEMENT OF STENT 1    GALLBLADDER SURGERY      HIP SURGERY      TONSILLECTOMY       Family History   Problem Relation Age of Onset    Leukemia Mother     Lymphoma Son     Lymphoma Son     No Known Problems Father      Social History     Social History    Marital status:       Spouse name: Federico Harris Number of children: N/A    Years of education: N/A     Occupational History    Retired      Social History Main Topics    Smoking status: Former Smoker     Quit date: 2/15/1975    Smokeless tobacco: Never Used      Comment: FORMER SMOKER - SMOKED 2 PPD FOR 20 YRS AND QUIT 2/15/1975 AS PER [employer]    Alcohol use No Comment: (HISTORY) RARELY AS PER [employer]    Drug use: No    Sexual activity: Not on file     Other Topics Concern    Not on file     Social History Narrative    No narrative on file       Current Outpatient Prescriptions:     allopurinol (ZYLOPRIM) 100 mg tablet, Take 1 tablet by mouth 2 (two) times a day, Disp: , Rfl:     aspirin 81 MG tablet, Take 2 tablets by mouth, Disp: , Rfl:     atorvastatin (LIPITOR) 40 mg tablet, , Disp: , Rfl:     cyanocobalamin (VITAMIN B-12) 100 mcg tablet, Take by mouth, Disp: , Rfl:     ergocalciferol (VITAMIN D2) 50,000 units, , Disp: , Rfl:     Ferrous Sulfate (IRON) 325 (65 Fe) MG TABS, Take 1 tablet by mouth daily, Disp: , Rfl:     finasteride (PROSCAR) 5 mg tablet, Take 1 tablet (5 mg total) by mouth daily, Disp: 90 tablet, Rfl: 1    furosemide (LASIX) 20 mg tablet, , Disp: , Rfl:     KLOR-CON 10 10 MEQ tablet, , Disp: , Rfl:     Magnesium Oxide 400 MG CAPS, Take 2 capsules by mouth daily, Disp: , Rfl:     polyethylene glycol-propylene glycol (SYSTANE ULTRA) 0 4-0 3 %, Apply 1 drop to eye daily, Disp: , Rfl:   Allergies   Allergen Reactions    Carboplatin Facial Swelling and Rash     Category: Allergy;      Vitals:    07/17/18 0910   BP: 118/70   Pulse: 72   Resp: 12   Temp: 97 8 °F (36 6 °C)       Physical Exam  Constitutional: General appearance: The Patient is well-developed and well-nourished who appears the stated age in no acute distress  Patient is pleasant and talkative  HEENT:  Normocephalic  Sclerae are anicteric  Mucous membranes are moist  Neck is supple without adenopathy  No JVD  Chest: The lungs are clear to auscultation  Cardiac: Heart is regular rate  Abdomen: Abdomen is soft, non-tender, non-distended and without masses  Extremities: There is no clubbing or cyanosis  There is no edema  Symmetric  Neuro: Grossly nonfocal  Gait is normal      Lymphatic: No evidence of cervical adenopathy bilaterally     No evidence of axillary adenopathy bilaterally  Skin: Warm, anicteric  Psych:  Patient is pleasant and talkative  Breasts:        Pathology:      Labs:      Imaging  Ct Chest Abdomen Pelvis W Contrast    Result Date: 7/12/2018  Narrative: CT CHEST, ABDOMEN AND PELVIS WITH IV CONTRAST INDICATION:   C15 9: Malignant neoplasm of esophagus, unspecified  History of esophageal cancer  History of gallbladder cancer  Asymptomatic  COMPARISON: July 19, 2017 TECHNIQUE: CT examination of the chest, abdomen and pelvis was performed  Axial, sagittal, and coronal 2D reformatted images were created from the source data and submitted for interpretation  Radiation dose length product (DLP) for this visit:  597 mGy-cm   This examination, like all CT scans performed in the [facility], was performed utilizing techniques to minimize radiation dose exposure, including the use of iterative reconstruction and automated exposure control  IV Contrast:  100 mL of iohexol (OMNIPAQUE)   350 Enteric Contrast: Enteric contrast was administered  FINDINGS: CHEST LUNGS:  Stable parenchymal scarring  Stable coarse calcification infrahilar left lower lobe  There is no tracheal or endobronchial lesion  PLEURA:  Stable bilateral partially calcified pleural plaque identified  HEART/GREAT VESSELS:  Cardiac size within normal limits  Coronary atherosclerosis  Valvular calcifications  Trace pericardial fluid, similar prior exam  MEDIASTINUM AND NOLA:  Patient is a gastric pull-through operation  The anastomosis appears somewhat stenotic, but otherwise stable from prior exam   No new adenopathy  CHEST WALL AND LOWER NECK:   Left side port with catheter tip in the lower SVC  ABDOMEN LIVER/BILIARY TREE:  Intrahepatic and extrahepatic biliary ductal dilatation is considered normal status post cholecystectomy and is stable from prior exam  GALLBLADDER:  Gallbladder has been removed  No mass or collection in the cholecystectomy bed   SPLEEN: Unremarkable  PANCREAS:  Diffusely atrophic  ADRENAL GLANDS: Unremarkable KIDNEYS/URETERS:  Bilateral cortical scarring and atrophy  Small cysts are stable  No hydronephrosis or renal colic  STOMACH AND BOWEL:  Patient is status post gastric pull-up  No obstruction  No perforation  Colonic diverticulosis  Retained stool in the distal sigmoid and rectum suggesting mild constipation  APPENDIX:  No findings to suggest appendicitis  ABDOMINOPELVIC CAVITY:  No ascites or free intraperitoneal air  No lymphadenopathy  VESSELS:  Calcified atherosclerotic plaque in the aorta  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  PELVIS REPRODUCTIVE ORGANS:  Unremarkable for patient's age  URINARY BLADDER:  Significant bladder wall thickening which may be due to trabeculation, this appears stable  ABDOMINAL WALL/INGUINAL REGIONS:  Unremarkable  OSSEOUS STRUCTURES:  No acute fracture or destructive osseous lesion  Left hip arthroplasty in place  Multilevel degenerative changes  Impression: Stable exam   No metastatic disease identified  The amount of formed stool in distal sigmoid and rectum suggests mild constipation  Workstation performed: [id number]     I reviewed the above laboratory and imaging data  Discussion/Summary:   39-year-old male with a remote history of gallbladder carcinoma and now with a [id number] esophageal carcinoma  The initial pathology revealed a neuroendocrine carcinoma, but the surgical pathology on the resection revealed adenocarcinoma with neuroendocrine features  At this time he is doing relatively well  He is clinically MILTON from his esophageal cancer at over 3 years and 10 years from his gallbladder cancer recurrence  He will continue his current regimen of nutritional supplements to maintain his weight  I will see him again in 6 months for another exam  His imaging is usually ordered by his medical oncologist so we will await the next set of imaging   He will continue endoscopic surveillance with his gastroenterologist  He is agreeable to this  All his questions were answered

## 2018-07-17 NOTE — LETTER
July 17, 2018     Nydia Rousseauyvon   [facility] [address]    Patient: Keshia Gomez   YOB: 1940   Date of Visit: 7/17/2018       Dear Dr Jacobo Irizarry: Thank you for referring Jordan Saunders to me for evaluation  Below are my notes for this consultation  If you have questions, please do not hesitate to call me  I look forward to following your patient along with you  Sincerely,        Kiana Dunaway MD        CC: MD Chayito Ambriz MD Bluford Rumps, MD Steen Clutter, MD  7/17/2018  9:33 AM  Sign at close encounter               Surgical Oncology Follow Up       [address] [place] [address]  1940  [id number]  [address] [place] [address],[address]  [facility] [place] [id number]    Diagnoses and all orders for this visit:    Carcinoma of esophagus [facility])        Chief Complaint   Patient presents with    Follow-up     6 month follow up       Return in about 6 months (around 1/17/2019)  Carcinoma of esophagus ([address] )    11/11/2014 Initial Diagnosis     Carcinoma of esophagus ([address] )         11/11/2014 Biopsy     GE junction biopsy and distal esophageal biopsy - 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  The tumor is histologically dissimilar to the patient's previously diagnosed adenocarcinoma of the gallbladder         11/24/2014 - 11/26/2014 Chemotherapy     Etoposide with carboplatin         12/17/2014 - 1/20/2015 Radiation     Distal esophagus to 4140 cGy         12/17/2014 - 1/20/2015 Chemotherapy     Concurrent weekly Taxol and carboplatin  1/13/15 had reaction to carboplatin and it was stopped    Taxol only aftyer that         3/4/2015 Surgery     Esophagectomy by Dr Chester Perea         6/25/2015 - 7/29/2015 Chemotherapy     Temodar           Malignant neoplasm of gallbladder ([facility] [address] )    5/17/2005 Initial Diagnosis     Malignant neoplasm of gallbladder ([facility] [address] )         7/2005 Surgery     Cholecystectomy followed by RFA         8/1/2005 - 9/2/2005 Radiation     Right abdomen/gallbladder area to 4500 cGy         8/1/2005 - 9/6/2005 Chemotherapy     Continuous infusion 5FU with radiation         4/2007 Progression     Recurrent disease         5/30/2007 - 1/30/2008 Chemotherapy     Gemcitabine/cisplatin/carboplatin         2/21/2008 Surgery     Resection of liver lesions and hannah hepatic mass         3/26/2008 - 8/20/2008 Chemotherapy     Gemcitabine/carboplatin            Diagnosis and Staging: Gallbladder cancer: T2N0M0   initially treated 2005  Recurrence treated February 2008  pB1D4D4 30cm and 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  iT0T1W9 esophageal cancer March 2015   Treatment History: Radical cholecystectomy with liver resection 2005  Recurrence treated with resection of segment 5 lesion in 2008  Carboplatin and etoposide November 2014 for his esophageal carcinoma  Radiation completed January 20, 2015  Transhiatal esophagectomy, March 2015  Xeloda/Temodar   Current Therapy: Observation   Disease Status: MILTON     History of Present Illness:   Patient returns after his esophagectomy  He is doing well at this time with no complaints  He has lost some weight recently secondary to the stress of dealing with the loss of his wife 1 month ago  No nausea or vomiting  His CT from July 10, 2018 was stable  I personally reviewed his films  Review of Systems  Complete ROS Surg Onc:   Complete ROS Surg Onc:   Constitutional: The patient denies new or recent history of general fatigue,no change in appetite  Recent weight loss  Eyes: No complaints of visual problems, no scleral icterus  ENT: no complaints of ear pain, no hoarseness, no difficulty swallowing,  no tinnitus and no new masses in head, oral cavity, or neck     Cardiovascular: No complaints of chest pain, no palpitations, no ankle edema  Respiratory: No complaints of shortness of breath, no cough  Gastrointestinal: No complaints of jaundice, no bloody stools, no pale stools  Genitourinary: No complaints of dysuria, no hematuria, no nocturia, no frequent urination, no urethral discharge  Musculoskeletal: No complaints of weakness, paralysis, joint stiffness or arthralgias  Integumentary: No complaints of rash, no new lesions  Neurological: No complaints of convulsions, no seizures, no dizziness  Hematologic/Lymphatic: No complaints of easy bruising  Endocrine:  No hot or cold intolerance  No polydipsia, polyphagia, or polyuria  Allergy/immunology:  No environmental allergies  No food allergies  Not immunocompromised  Skin:  No pallor or rash  No wound          Patient Active Problem List   Diagnosis    Carcinoma of esophagus ([place] [address] )    Malignant neoplasm of gallbladder (HCC)    Type 2 diabetes mellitus ([place] [address] )    Benign essential hypertension     Past Medical History:   Diagnosis Date    Abnormal EKG     Acute myocardial infarction ([place] [address] )     Benign essential hypertension     LAST ASSESSED: [id number]    Dry skin     LAST ASSESSED: [id number]    History of chemotherapy     History of external beam radiation therapy     Hypotension     Hypoxia     Liver cancer ([place] [address] )     Obesity     LAST ASSESSED: [id number]    Puncture wound of thumb     LAST ASSESSED: [id number]    Superficial injury of foot     LAST ASSESSED: [id number]    Tachycardia     Type 2 diabetes mellitus (HCC)     LAST ASSESSED: [id number]    Vitamin D deficiency     LAST ASSESSED: [id number]     Past Surgical History:   Procedure Laterality Date    ANGIOPLASTY      PREVIOUS LASER ANGIOPLASTY WITH STENT PLACEMENT    CATARACT EXTRACTION W/  INTRAOCULAR LENS IMPLANT Bilateral     CHOLECYSTECTOMY      CORONARY ANGIOPLASTY WITH STENT PLACEMENT      PLACEMENT OF STENT 1    GALLBLADDER SURGERY      HIP SURGERY      TONSILLECTOMY       Family History   Problem Relation Age of Onset   Ismael Villanueva Leukemia Mother     Lymphoma Son     Lymphoma Son     No Known Problems Father      Social History     Social History    Marital status:      Spouse name: Lisa Ordonez Number of children: N/A    Years of education: N/A     Occupational History    Retired      Social History Main Topics    Smoking status: Former Smoker     Quit date: 2/15/1975    Smokeless tobacco: Never Used      Comment: FORMER SMOKER - SMOKED 2 PPD FOR 20 YRS AND QUIT 2/15/1975 AS PER [employer]    Alcohol use No      Comment: (HISTORY) RARELY AS PER [employer]    Drug use: No    Sexual activity: Not on file     Other Topics Concern    Not on file     Social History Narrative    No narrative on file       Current Outpatient Prescriptions:     allopurinol (ZYLOPRIM) 100 mg tablet, Take 1 tablet by mouth 2 (two) times a day, Disp: , Rfl:     aspirin 81 MG tablet, Take 2 tablets by mouth, Disp: , Rfl:     atorvastatin (LIPITOR) 40 mg tablet, , Disp: , Rfl:     cyanocobalamin (VITAMIN B-12) 100 mcg tablet, Take by mouth, Disp: , Rfl:     ergocalciferol (VITAMIN D2) 50,000 units, , Disp: , Rfl:     Ferrous Sulfate (IRON) 325 (65 Fe) MG TABS, Take 1 tablet by mouth daily, Disp: , Rfl:     finasteride (PROSCAR) 5 mg tablet, Take 1 tablet (5 mg total) by mouth daily, Disp: 90 tablet, Rfl: 1    furosemide (LASIX) 20 mg tablet, , Disp: , Rfl:     KLOR-CON 10 10 MEQ tablet, , Disp: , Rfl:     Magnesium Oxide 400 MG CAPS, Take 2 capsules by mouth daily, Disp: , Rfl:     polyethylene glycol-propylene glycol (SYSTANE ULTRA) 0 4-0 3 %, Apply 1 drop to eye daily, Disp: , Rfl:   Allergies   Allergen Reactions    Carboplatin Facial Swelling and Rash     Category: Allergy;      Vitals:    07/17/18 0910   BP: 118/70   Pulse: 72   Resp: 12   Temp: 97 8 °F (36 6 °C)       Physical Exam  Constitutional: General appearance:  The Patient is well-developed and well-nourished who appears the stated age in no acute distress  Patient is pleasant and talkative  HEENT:  Normocephalic  Sclerae are anicteric  Mucous membranes are moist  Neck is supple without adenopathy  No JVD  Chest: The lungs are clear to auscultation  Cardiac: Heart is regular rate  Abdomen: Abdomen is soft, non-tender, non-distended and without masses  Extremities: There is no clubbing or cyanosis  There is no edema  Symmetric  Neuro: Grossly nonfocal  Gait is normal      Lymphatic: No evidence of cervical adenopathy bilaterally  No evidence of axillary adenopathy bilaterally  Skin: Warm, anicteric  Psych:  Patient is pleasant and talkative  Breasts:        Pathology:      Labs:      Imaging  Ct Chest Abdomen Pelvis W Contrast    Result Date: 7/12/2018  Narrative: CT CHEST, ABDOMEN AND PELVIS WITH IV CONTRAST INDICATION:   C15 9: Malignant neoplasm of esophagus, unspecified  History of esophageal cancer  History of gallbladder cancer  Asymptomatic  COMPARISON: July 19, 2017 TECHNIQUE: CT examination of the chest, abdomen and pelvis was performed  Axial, sagittal, and coronal 2D reformatted images were created from the source data and submitted for interpretation  Radiation dose length product (DLP) for this visit:  597 mGy-cm   This examination, like all CT scans performed in the [facility], was performed utilizing techniques to minimize radiation dose exposure, including the use of iterative reconstruction and automated exposure control  IV Contrast:  100 mL of iohexol (OMNIPAQUE)   350 Enteric Contrast: Enteric contrast was administered  FINDINGS: CHEST LUNGS:  Stable parenchymal scarring  Stable coarse calcification infrahilar left lower lobe  There is no tracheal or endobronchial lesion  PLEURA:  Stable bilateral partially calcified pleural plaque identified  HEART/GREAT VESSELS:  Cardiac size within normal limits  Coronary atherosclerosis    Valvular calcifications  Trace pericardial fluid, similar prior exam  MEDIASTINUM AND NOLA:  Patient is a gastric pull-through operation  The anastomosis appears somewhat stenotic, but otherwise stable from prior exam   No new adenopathy  CHEST WALL AND LOWER NECK:   Left side port with catheter tip in the lower SVC  ABDOMEN LIVER/BILIARY TREE:  Intrahepatic and extrahepatic biliary ductal dilatation is considered normal status post cholecystectomy and is stable from prior exam  GALLBLADDER:  Gallbladder has been removed  No mass or collection in the cholecystectomy bed  SPLEEN:  Unremarkable  PANCREAS:  Diffusely atrophic  ADRENAL GLANDS: Unremarkable KIDNEYS/URETERS:  Bilateral cortical scarring and atrophy  Small cysts are stable  No hydronephrosis or renal colic  STOMACH AND BOWEL:  Patient is status post gastric pull-up  No obstruction  No perforation  Colonic diverticulosis  Retained stool in the distal sigmoid and rectum suggesting mild constipation  APPENDIX:  No findings to suggest appendicitis  ABDOMINOPELVIC CAVITY:  No ascites or free intraperitoneal air  No lymphadenopathy  VESSELS:  Calcified atherosclerotic plaque in the aorta  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  PELVIS REPRODUCTIVE ORGANS:  Unremarkable for patient's age  URINARY BLADDER:  Significant bladder wall thickening which may be due to trabeculation, this appears stable  ABDOMINAL WALL/INGUINAL REGIONS:  Unremarkable  OSSEOUS STRUCTURES:  No acute fracture or destructive osseous lesion  Left hip arthroplasty in place  Multilevel degenerative changes  Impression: Stable exam   No metastatic disease identified  The amount of formed stool in distal sigmoid and rectum suggests mild constipation  Workstation performed: [id number]     I reviewed the above laboratory and imaging data  Discussion/Summary:   16-year-old male with a remote history of gallbladder carcinoma and now with a kD7T3F5 esophageal carcinoma   The initial pathology revealed a neuroendocrine carcinoma, but the surgical pathology on the resection revealed adenocarcinoma with neuroendocrine features  At this time he is doing relatively well  He is clinically MILTON from his esophageal cancer at over 3 years and 10 years from his gallbladder cancer recurrence  He will continue his current regimen of nutritional supplements to maintain his weight  I will see him again in 6 months for another exam  His imaging is usually ordered by his medical oncologist so we will await the next set of imaging  He will continue endoscopic surveillance with his gastroenterologist  He is agreeable to this  All his questions were answered

## 2018-08-20 ENCOUNTER — OFFICE VISIT (OUTPATIENT)
Dept: FAMILY MEDICINE CLINIC | Facility: CLINIC | Age: 78
End: 2018-08-20
Payer: MEDICARE

## 2018-08-20 VITALS
HEART RATE: 67 BPM | DIASTOLIC BLOOD PRESSURE: 62 MMHG | TEMPERATURE: 97.6 F | SYSTOLIC BLOOD PRESSURE: 112 MMHG | OXYGEN SATURATION: 98 % | BODY MASS INDEX: 22.6 KG/M2 | HEIGHT: 69 IN | WEIGHT: 152.6 LBS

## 2018-08-20 DIAGNOSIS — Z00.00 MEDICARE ANNUAL WELLNESS VISIT, SUBSEQUENT: ICD-10-CM

## 2018-08-20 DIAGNOSIS — F43.21 GRIEVING: ICD-10-CM

## 2018-08-20 DIAGNOSIS — E78.00 HYPERCHOLESTEREMIA: Primary | ICD-10-CM

## 2018-08-20 PROCEDURE — G0439 PPPS, SUBSEQ VISIT: HCPCS | Performed by: FAMILY MEDICINE

## 2018-08-20 PROCEDURE — 99213 OFFICE O/P EST LOW 20 MIN: CPT | Performed by: FAMILY MEDICINE

## 2018-08-20 NOTE — PROGRESS NOTES
Assessment and Plan:  Problem List Items Addressed This Visit     None        Health Maintenance Due   Topic Date Due    SLP PLAN OF CARE  1940    CRC Screening: Colonoscopy  1940    DTaP,Tdap,and Td Vaccines (1 - Tdap) 03/09/2013    GLAUCOMA SCREENING 65 + YR  12/17/2014    URINE MICROALBUMIN  04/29/2015    Diabetic Foot Exam  05/02/2015    HEMOGLOBIN A1C  06/09/2015    DM Eye Exam  12/02/2015         HPI:  Ebony Fam is a 77 y o  male here for his Subsequent Wellness Visit      Patient Active Problem List   Diagnosis    Carcinoma of esophagus (HCC)    Malignant neoplasm of gallbladder (HCC)    Type 2 diabetes mellitus (HCC)    Benign essential hypertension     Past Medical History:   Diagnosis Date    Abnormal EKG     Acute myocardial infarction (HCC)     Benign essential hypertension     LAST ASSESSED: [id number]    Dry skin     LAST ASSESSED: [id number]    History of chemotherapy     History of external beam radiation therapy     Hypotension     Hypoxia     Liver cancer (HCC)     Obesity     LAST ASSESSED: [id number]    Puncture wound of thumb     LAST ASSESSED: [id number]    Superficial injury of foot     LAST ASSESSED: [id number]    Tachycardia     Type 2 diabetes mellitus (HCC)     LAST ASSESSED: [id number]    Vitamin D deficiency     LAST ASSESSED: [id number]     Past Surgical History:   Procedure Laterality Date    ANGIOPLASTY      PREVIOUS LASER ANGIOPLASTY WITH STENT PLACEMENT    CATARACT EXTRACTION W/  INTRAOCULAR LENS IMPLANT Bilateral     CHOLECYSTECTOMY      CORONARY ANGIOPLASTY WITH STENT PLACEMENT      PLACEMENT OF STENT 1    GALLBLADDER SURGERY      HIP SURGERY      TONSILLECTOMY       Family History   Problem Relation Age of Onset    Leukemia Mother     Lymphoma Son     Lymphoma Son     No Known Problems Father      History   Smoking Status    Former Smoker    Quit date: 2/15/1975   Smokeless Tobacco    Never Used     Comment: FORMER SMOKER - SMOKED 2 PPD FOR 20 YRS AND QUIT 2/15/1975 AS PER [employer]     History   Alcohol Use No     Comment: (HISTORY) RARELY AS PER [employer]      History   Drug Use No         Current Outpatient Prescriptions   Medication Sig Dispense Refill    allopurinol (ZYLOPRIM) 100 mg tablet Take 1 tablet by mouth 2 (two) times a day      aspirin 81 MG tablet Take 2 tablets by mouth      atorvastatin (LIPITOR) 40 mg tablet       cyanocobalamin (VITAMIN B-12) 100 mcg tablet Take by mouth      ergocalciferol (VITAMIN D2) 50,000 units       Ferrous Sulfate (IRON) 325 (65 Fe) MG TABS Take 1 tablet by mouth daily      finasteride (PROSCAR) 5 mg tablet Take 1 tablet (5 mg total) by mouth daily 90 tablet 1    furosemide (LASIX) 20 mg tablet       KLOR-CON 10 10 MEQ tablet       Magnesium Oxide 400 MG CAPS Take 2 capsules by mouth daily      polyethylene glycol-propylene glycol (SYSTANE ULTRA) 0 4-0 3 % Apply 1 drop to eye daily       No current facility-administered medications for this visit  Allergies   Allergen Reactions    Carboplatin Facial Swelling and Rash     Category: Allergy;      Immunization History   Administered Date(s) Administered    Influenza Quadrivalent Preservative Free 3 years and older IM 10/10/2014    Influenza Quadrivalent, 6-35 Months IM 09/02/2016    Pneumococcal Conjugate 13-Valent 06/30/2015    Pneumococcal Polysaccharide PPV23 11/18/2014    TD (adult) Preservative Free 03/08/2013       Patient Care Team:  Ina Noyola DO as PCP - Jojo Menjivar MD (Radiation Oncology)  Julius Thurman MD (Cardiology)  Mitchell Guerrero MD (Gastroenterology)  Yamini Castro MD (Surgical Oncology)  Pedro Luis Morales MD (Hematology and Oncology)  Roman Hirsch MD (Nephrology)    Medicare Screening Tests and Risk Assessments:  Roby Lord is here for his Subsequent Wellness visit  Health Risk Assessment:  Patient rates overall health as very good   Patient feels that their physical health rating is Same  Eyesight was rated as Same  Hearing was rated as Same  Patient feels that their emotional and mental health rating is Same  Emotional/Mental Health:    PHQ-9 Depression Screening:    Frequency of the following problems over the past two weeks:      1  Little interest or pleasure in doing things: 0 - not at all      2  Feeling down, depressed, or hopeless: 0 - not at all  PHQ-2 Score: 0          Broken Bones/Falls: Fall Risk Assessment:    In the past year, patient has experienced: No history of falling in past year          Bladder/Bowel:  Patient has not leaked urine accidently in the last six months  Patient reports no loss of bowel control  Immunizations:  Patient has had a flu vaccination within the last year  Patient has received a pneumonia shot  Patient has received a shingles shot  Patient has received tetanus/diphtheria shot  Home Safety:  Patient does not have trouble with stairs inside or outside of their home  Patient currently reports that there are no safety hazards present in home, working smoke alarms,     Preventative Screenings:   prostate cancer screen performed, colon cancer screen completed, cholesterol screen completed, glaucoma eye exam completed,     Nutrition:  Current diet: Regular with servings of the following:    Medications:  Patient is not currently taking any over-the-counter supplements  Patient is able to manage medications  Lifestyle Choices:  Patient drives a vehicle  Patient wears seat belt          Activities of Daily Living:  Can get out of bed by his or her self, able to dress self, able to make own meals, able to do own shopping, able to bathe self, can do own laundry/housekeeping, can manage own money, pay bills and track expenses    Preventative Screening/Counseling:      Cardiovascular:      General: Risks and Benefits Discussed and Screening Current          Diabetes:      General: Risks and Benefits Discussed and Screening Current          Colorectal Cancer:      General: Screening Current          Prostate Cancer:      General: Screening Current          Glaucoma:      General: Screening Current          Advanced Directives:   Patient has living will for healthcare, Information on ACP and/or AD provided  No exam data present  Physical Exam :  General ROS: negative  Physical Exam   Constitutional: He is oriented to person, place, and time  He appears well-developed and well-nourished  HENT:   Head: Normocephalic  Right Ear: External ear normal    Left Ear: External ear normal    Nose: Nose normal    Mouth/Throat: Oropharynx is clear and moist    Eyes: EOM are normal  Pupils are equal, round, and reactive to light  Neck: Normal range of motion  Neck supple  Cardiovascular: Normal rate, regular rhythm, normal heart sounds and intact distal pulses  Pulmonary/Chest: Effort normal and breath sounds normal    Abdominal: Soft  Bowel sounds are normal    Musculoskeletal: Normal range of motion  Neurological: He is alert and oriented to person, place, and time  Skin: Skin is warm and dry  Psychiatric: He has a normal mood and affect   His behavior is normal  Judgment and thought content normal

## 2018-08-20 NOTE — PROGRESS NOTES
Assessment/Plan: patient here today for follow up for heart burn     No problem-specific Assessment & Plan notes found for this encounter  Diagnoses and all orders for this visit:    Hypercholesteremia          Subjective:      Patient ID: Charles Santoyo is a 77 y o  male  Follow up  Doing well  Continues to bowl  Stopped the Hytrin and lightheaded feeling gone  Not using Lasix, or using PRN if weight up  Tolerating PO foods  The following portions of the patient's history were reviewed and updated as appropriate: allergies, current medications, past family history, past medical history, past social history, past surgical history and problem list     Review of Systems   Constitutional: Negative  HENT: Negative  Eyes: Negative  Respiratory: Negative  Cardiovascular: Negative  Gastrointestinal: Negative  Genitourinary: Positive for difficulty urinating  Negative for decreased urine volume  Musculoskeletal: Negative  Skin: Negative  Neurological: Negative  Psychiatric/Behavioral: Negative  Objective:      /62 (BP Location: Left arm, Patient Position: Sitting, Cuff Size: Standard)   Pulse 67   Temp 97 6 °F (36 4 °C) (Oral)   Ht 5' 9" (1 753 m)   Wt 69 2 kg (152 lb 9 6 oz)   SpO2 98%   BMI 22 54 kg/m²          Physical Exam   Constitutional: He is oriented to person, place, and time  He appears well-developed and well-nourished  HENT:   Head: Normocephalic and atraumatic  Right Ear: External ear normal    Left Ear: External ear normal    Nose: Nose normal    Mouth/Throat: Oropharynx is clear and moist    Eyes: Conjunctivae and EOM are normal  Pupils are equal, round, and reactive to light  Neck: Normal range of motion  Neck supple  Cardiovascular: Normal rate, regular rhythm, normal heart sounds and intact distal pulses  Pulmonary/Chest: Effort normal and breath sounds normal    Abdominal: Soft   Bowel sounds are normal    Neurological: He is alert and oriented to person, place, and time  Skin: Skin is warm and dry  Psychiatric: He has a normal mood and affect   His behavior is normal  Judgment and thought content normal

## 2018-10-03 ENCOUNTER — LAB REQUISITION (OUTPATIENT)
Dept: LAB | Facility: HOSPITAL | Age: 78
End: 2018-10-03
Payer: MEDICARE

## 2018-10-03 DIAGNOSIS — C74.10: ICD-10-CM

## 2018-10-03 DIAGNOSIS — C23 MALIGNANT NEOPLASM OF GALLBLADDER (HCC): ICD-10-CM

## 2018-10-03 LAB
AFP-TM SERPL-MCNC: 1.4 NG/ML (ref 0.5–8)
RETICS # AUTO: NORMAL 10*3/UL (ref 14356–105094)
RETICS # CALC: 1.62 % (ref 0.37–1.87)

## 2018-10-03 PROCEDURE — 85045 AUTOMATED RETICULOCYTE COUNT: CPT | Performed by: NURSE PRACTITIONER

## 2018-10-03 PROCEDURE — 83519 RIA NONANTIBODY: CPT | Performed by: NURSE PRACTITIONER

## 2018-10-03 PROCEDURE — 82105 ALPHA-FETOPROTEIN SERUM: CPT | Performed by: NURSE PRACTITIONER

## 2018-10-09 ENCOUNTER — OFFICE VISIT (OUTPATIENT)
Dept: FAMILY MEDICINE CLINIC | Facility: CLINIC | Age: 78
End: 2018-10-09
Payer: MEDICARE

## 2018-10-09 VITALS
WEIGHT: 158.6 LBS | SYSTOLIC BLOOD PRESSURE: 124 MMHG | HEIGHT: 69 IN | OXYGEN SATURATION: 99 % | TEMPERATURE: 98 F | HEART RATE: 70 BPM | BODY MASS INDEX: 23.49 KG/M2 | DIASTOLIC BLOOD PRESSURE: 60 MMHG

## 2018-10-09 DIAGNOSIS — D64.9 LOW HEMOGLOBIN: ICD-10-CM

## 2018-10-09 DIAGNOSIS — E83.42 HYPOMAGNESEMIA: ICD-10-CM

## 2018-10-09 DIAGNOSIS — C15.9 CARCINOMA OF ESOPHAGUS (HCC): ICD-10-CM

## 2018-10-09 DIAGNOSIS — E87.6 HYPOKALEMIA: Primary | ICD-10-CM

## 2018-10-09 PROCEDURE — 99214 OFFICE O/P EST MOD 30 MIN: CPT | Performed by: FAMILY MEDICINE

## 2018-10-09 NOTE — PROGRESS NOTES
Assessment/Plan: patient here today for low hemoglobin, magnesium and potassium     No problem-specific Assessment & Plan notes found for this encounter  Diagnoses and all orders for this visit:    Hypokalemia  -     Basic metabolic panel; Future    Hypomagnesemia    Low hemoglobin  -     CBC and Platelet; Future    Carcinoma of esophagus ([facility] [address] )    Other orders  -     Cancel: HEMOGLOBIN A1C W/ EAG ESTIMATION; Future          Subjective:      Patient ID: Brittney Schmidt is a 77 y o  male  Patient her for abnormal labs as listed in chief complaint  Reviewed labs, have been fairly consistent  Complained of low H and H, K+, Mg++  Patient has not been taking the Lasix, Magnesium  Has taken the K+ past 3 days  Swallowing is good unless food is heavy, eg: red meat  The following portions of the patient's history were reviewed and updated as appropriate: allergies, current medications, past family history, past medical history, past social history, past surgical history and problem list     Current Outpatient Prescriptions:     allopurinol (ZYLOPRIM) 100 mg tablet, Take 1 tablet by mouth 2 (two) times a day, Disp: , Rfl:     atorvastatin (LIPITOR) 40 mg tablet, , Disp: , Rfl:     cyanocobalamin (VITAMIN B-12) 100 mcg tablet, Take by mouth, Disp: , Rfl:     ergocalciferol (VITAMIN D2) 50,000 units, , Disp: , Rfl:     finasteride (PROSCAR) 5 mg tablet, Take 1 tablet (5 mg total) by mouth daily, Disp: 90 tablet, Rfl: 1    furosemide (LASIX) 20 mg tablet, , Disp: , Rfl:     KLOR-CON 10 10 MEQ tablet, , Disp: , Rfl:     Magnesium Oxide 400 MG CAPS, Take 2 capsules by mouth daily, Disp: , Rfl:     polyethylene glycol-propylene glycol (SYSTANE ULTRA) 0 4-0 3 %, Apply 1 drop to eye daily, Disp: , Rfl:     aspirin 81 MG tablet, Take 2 tablets by mouth, Disp: , Rfl:     Ferrous Sulfate (IRON) 325 (65 Fe) MG TABS, Take 1 tablet by mouth daily, Disp: , Rfl:     Review of Systems   Constitutional: Negative  HENT: Negative  Eyes: Negative  Respiratory: Negative  Cardiovascular: Negative  Gastrointestinal: Negative  Genitourinary: Negative  Musculoskeletal: Negative  Skin: Negative  Neurological: Negative  Psychiatric/Behavioral: Negative  Objective:      /60 (BP Location: Left arm, Patient Position: Sitting, Cuff Size: Standard)   Pulse 70   Temp 98 °F (36 7 °C) (Oral)   Ht 5' 9" (1 753 m)   Wt 71 9 kg (158 lb 9 6 oz)   SpO2 99%   BMI 23 42 kg/m²          Physical Exam   Constitutional: He is oriented to person, place, and time  He appears well-developed and well-nourished  HENT:   Head: Normocephalic and atraumatic  Right Ear: External ear normal    Left Ear: External ear normal    Nose: Nose normal    Mouth/Throat: Oropharynx is clear and moist    Eyes: Pupils are equal, round, and reactive to light  Conjunctivae and EOM are normal    Neck: Normal range of motion  Neck supple  Cardiovascular: Normal rate, regular rhythm, normal heart sounds and intact distal pulses  Pulmonary/Chest: Effort normal and breath sounds normal    Abdominal: Soft  Bowel sounds are normal    Neurological: He is alert and oriented to person, place, and time  Skin: Skin is warm and dry  Psychiatric: He has a normal mood and affect   His behavior is normal  Judgment and thought content normal

## 2018-10-09 NOTE — PATIENT INSTRUCTIONS
Foods with collagen, proteine supplement (whey protein)  Labs patient referring to are not in chart  Take the K+ 3 times a week unless taking Lasix  Continue the Magnesium  Possible absorption problem ?

## 2018-10-10 LAB — NSE SERPL IA-MCNC: 3.3 NG/ML (ref 0–12.5)

## 2018-11-06 ENCOUNTER — LAB REQUISITION (OUTPATIENT)
Dept: LAB | Facility: HOSPITAL | Age: 78
End: 2018-11-06
Payer: MEDICARE

## 2018-11-06 DIAGNOSIS — C7A.1 MALIGNANT POORLY DIFFERENTIATED NEUROENDOCRINE TUMORS (HCC): ICD-10-CM

## 2018-11-06 DIAGNOSIS — C23 MALIGNANT NEOPLASM OF GALLBLADDER (HCC): ICD-10-CM

## 2018-11-06 DIAGNOSIS — D64.9 ANEMIA: ICD-10-CM

## 2018-11-06 LAB
BACTERIA UR QL AUTO: NORMAL /HPF
BILIRUB UR QL STRIP: NEGATIVE
CLARITY UR: CLEAR
COLOR UR: YELLOW
GLUCOSE UR STRIP-MCNC: NEGATIVE MG/DL
HGB UR QL STRIP.AUTO: NEGATIVE
HYALINE CASTS #/AREA URNS LPF: NORMAL /LPF
IRON SATN MFR SERPL: 18 %
IRON SERPL-MCNC: 42 UG/DL (ref 65–175)
KETONES UR STRIP-MCNC: NEGATIVE MG/DL
LEUKOCYTE ESTERASE UR QL STRIP: NEGATIVE
NITRITE UR QL STRIP: NEGATIVE
NON-SQ EPI CELLS URNS QL MICRO: NORMAL /HPF
PH UR STRIP.AUTO: 5 [PH] (ref 4.5–8)
PROT UR STRIP-MCNC: NEGATIVE MG/DL
RBC #/AREA URNS AUTO: NORMAL /HPF
RETICS # AUTO: NORMAL 10*3/UL (ref 14356–105094)
RETICS # CALC: 1.56 % (ref 0.37–1.87)
SP GR UR STRIP.AUTO: 1.02 (ref 1–1.03)
TIBC SERPL-MCNC: 231 UG/DL (ref 250–450)
TIBC SERPL-MCNC: 231 UG/DL (ref 250–450)
TRANSFERRIN SERPL-MCNC: 211 MG/DL (ref 200–400)
UROBILINOGEN UR QL STRIP.AUTO: 0.2 E.U./DL
WBC #/AREA URNS AUTO: NORMAL /HPF

## 2018-11-06 PROCEDURE — 85045 AUTOMATED RETICULOCYTE COUNT: CPT | Performed by: NURSE PRACTITIONER

## 2018-11-06 PROCEDURE — 83520 IMMUNOASSAY QUANT NOS NONAB: CPT | Performed by: NURSE PRACTITIONER

## 2018-11-06 PROCEDURE — 81001 URINALYSIS AUTO W/SCOPE: CPT | Performed by: NURSE PRACTITIONER

## 2018-11-06 PROCEDURE — 83540 ASSAY OF IRON: CPT | Performed by: NURSE PRACTITIONER

## 2018-11-06 PROCEDURE — 84466 ASSAY OF TRANSFERRIN: CPT | Performed by: NURSE PRACTITIONER

## 2018-11-06 PROCEDURE — 83550 IRON BINDING TEST: CPT | Performed by: NURSE PRACTITIONER

## 2018-11-06 PROCEDURE — 82668 ASSAY OF ERYTHROPOIETIN: CPT | Performed by: NURSE PRACTITIONER

## 2018-11-07 LAB
EPO SERPL-ACNC: 12.6 MIU/ML (ref 2.6–18.5)
STFR SERPL-SCNC: 20.7 NMOL/L (ref 12.2–27.3)

## 2018-11-13 ENCOUNTER — OFFICE VISIT (OUTPATIENT)
Dept: CARDIOLOGY CLINIC | Facility: CLINIC | Age: 78
End: 2018-11-13
Payer: MEDICARE

## 2018-11-13 VITALS
HEIGHT: 69 IN | BODY MASS INDEX: 23.24 KG/M2 | HEART RATE: 60 BPM | DIASTOLIC BLOOD PRESSURE: 60 MMHG | WEIGHT: 156.9 LBS | SYSTOLIC BLOOD PRESSURE: 116 MMHG

## 2018-11-13 DIAGNOSIS — I25.10 CORONARY ARTERY DISEASE INVOLVING NATIVE CORONARY ARTERY OF NATIVE HEART WITHOUT ANGINA PECTORIS: Primary | ICD-10-CM

## 2018-11-13 DIAGNOSIS — E78.00 HYPERCHOLESTEROLEMIA: ICD-10-CM

## 2018-11-13 DIAGNOSIS — I10 HYPERTENSION, ESSENTIAL, BENIGN: ICD-10-CM

## 2018-11-13 PROCEDURE — 93000 ELECTROCARDIOGRAM COMPLETE: CPT | Performed by: INTERNAL MEDICINE

## 2018-11-13 PROCEDURE — 99214 OFFICE O/P EST MOD 30 MIN: CPT | Performed by: INTERNAL MEDICINE

## 2018-11-13 RX ORDER — NITROGLYCERIN 400 UG/1
1 SPRAY ORAL
Qty: 12 G | Refills: 5 | Status: SHIPPED | OUTPATIENT
Start: 2018-11-13 | End: 2020-11-02

## 2018-11-13 RX ORDER — NITROGLYCERIN 400 UG/1
1 SPRAY ORAL
COMMUNITY
End: 2018-11-13 | Stop reason: SDUPTHER

## 2018-11-13 NOTE — PROGRESS NOTES
Cardiology Follow Up    Delilah Phelps  1940  [id number]  [address] [facility]  [address] [place] [address] Drive [address] [place] [phone]    1  Coronary artery disease involving native coronary artery of native heart without angina pectoris     2  Hypercholesterolemia     3  Hypertension, essential, benign  POCT ECG       Interval History:   Cardiology follow-up  Patient continues to do well, is active  Denies any chest pain or dyspnea, states been compliant with low-cholesterol diet, his lipids previously checked very low LDL of 32 on high-intensity statin therapy  Compliant with low-sodium diet, blood pressures been well control as well  No nitroglycerin use denies any syncope or presyncope  Patient Active Problem List   Diagnosis    Carcinoma of esophagus ([place] [address] )    Malignant neoplasm of gallbladder (HCC)    Type 2 diabetes mellitus (HCC)    Hypertension, essential, benign    Coronary artery disease involving native coronary artery of native heart without angina pectoris    Hypercholesterolemia     Past Medical History:   Diagnosis Date    Abnormal EKG     Acute myocardial infarction ([place] [address] )     Benign essential hypertension     LAST ASSESSED: [id number]    Dry skin     LAST ASSESSED: [id number]    History of chemotherapy     History of external beam radiation therapy     Hypotension     Hypoxia     Liver cancer ([place] [address] )     Obesity     LAST ASSESSED: [id number]    Puncture wound of thumb     LAST ASSESSED: [id number]    Superficial injury of foot     LAST ASSESSED: [id number]    Tachycardia     Type 2 diabetes mellitus ([place] [address] )     LAST ASSESSED: [id number]    Vitamin D deficiency     LAST ASSESSED: [id number]     Social History     Social History    Marital status:       Spouse name: Virginia Wells Number of children: N/A    Years of education: N/A     Occupational History    Retired      Social History Main Topics  Smoking status: Former Smoker     Quit date: 2/15/1975    Smokeless tobacco: Never Used      Comment: FORMER SMOKER - SMOKED 2 PPD FOR 20 YRS AND QUIT 2/15/1975 AS PER [employer]    Alcohol use No      Comment: (HISTORY) RARELY AS PER [employer]    Drug use: No    Sexual activity: Not on file     Other Topics Concern    Not on file     Social History Narrative    No narrative on file      Family History   Problem Relation Age of Onset    Leukemia Mother     Lymphoma Son     Lymphoma Son     No Known Problems Father      Past Surgical History:   Procedure Laterality Date    ANGIOPLASTY      PREVIOUS LASER ANGIOPLASTY WITH STENT PLACEMENT    CATARACT EXTRACTION W/  INTRAOCULAR LENS IMPLANT Bilateral     CHOLECYSTECTOMY      CORONARY ANGIOPLASTY WITH STENT PLACEMENT      PLACEMENT OF STENT 1    GALLBLADDER SURGERY      HIP SURGERY      TONSILLECTOMY         Current Outpatient Prescriptions:     allopurinol (ZYLOPRIM) 100 mg tablet, Take 1 tablet by mouth 2 (two) times a day, Disp: , Rfl:     aspirin 81 MG tablet, Take 2 tablets by mouth, Disp: , Rfl:     atorvastatin (LIPITOR) 40 mg tablet, Take 60 mg by mouth daily  , Disp: , Rfl:     cyanocobalamin (VITAMIN B-12) 100 mcg tablet, Take by mouth daily  , Disp: , Rfl:     ergocalciferol (VITAMIN D2) 50,000 units, , Disp: , Rfl:     Ferrous Sulfate (IRON) 325 (65 Fe) MG TABS, Take 1 tablet by mouth daily, Disp: , Rfl:     finasteride (PROSCAR) 5 mg tablet, Take 1 tablet (5 mg total) by mouth daily, Disp: 90 tablet, Rfl: 1    furosemide (LASIX) 20 mg tablet, Take 20 mg by mouth as needed  , Disp: , Rfl:     KLOR-CON 10 10 MEQ tablet, Take 10 mEq by mouth daily  , Disp: , Rfl:     Magnesium Oxide 400 MG CAPS, Take 3 capsules by mouth daily  , Disp: , Rfl:     nitroglycerin (NITROLINGUAL) 0 4 mg/spray spray, Place 1 spray under the tongue every 5 (five) minutes as needed for chest pain, Disp: , Rfl:     polyethylene glycol-propylene glycol (SYSTANE ULTRA) 0 4-0 3 %, Apply 1 drop to eye daily, Disp: , Rfl:   Allergies   Allergen Reactions    Carboplatin Facial Swelling and Rash     Category:  Allergy;        Labs:  Lab Requisition on 11/06/2018   Component Date Value    Iron Saturation 11/06/2018 18     TIBC 11/06/2018 231*    Iron 11/06/2018 42*    TIBC 11/06/2018 231*    Transferrin 11/06/2018 211     Retic Ct Abs 11/06/2018 09916     Retic Ct Pct 11/06/2018 1 56     Soluble Transferrin Rece* 11/06/2018 20 7     Erythropoietin 11/06/2018 12 6     Clarity, UA 11/06/2018 Clear     Color, UA 11/06/2018 Yellow     Specific Gravity, UA 11/06/2018 1 019     pH, UA 11/06/2018 5 0     Glucose, UA 11/06/2018 Negative     Ketones, UA 11/06/2018 Negative     Blood, UA 11/06/2018 Negative     Protein, UA 11/06/2018 Negative     Nitrite, UA 11/06/2018 Negative     Bilirubin, UA 11/06/2018 Negative     Urobilinogen, UA 11/06/2018 0 2     Leukocytes, UA 11/06/2018 Negative     WBC, UA 11/06/2018 None Seen     RBC, UA 11/06/2018 None Seen     Hyaline Casts, UA 11/06/2018 None Seen     Bacteria, UA 11/06/2018 None Seen     Epithelial Cells 11/06/2018 None Seen    Lab Requisition on 10/03/2018   Component Date Value    AFP TUMOR MARKER 10/03/2018 1 4     Neuron Specific Enolase 10/03/2018 3 3     Retic Ct Abs 10/03/2018 38785     Retic Ct Pct 10/03/2018 1 62    Appointment on 06/27/2018   Component Date Value    Sodium 06/27/2018 139     Potassium 06/27/2018 4 5     Chloride 06/27/2018 106     CO2 06/27/2018 27     ANION GAP 06/27/2018 6     BUN 06/27/2018 20     Creatinine 06/27/2018 1 40*    Glucose, Fasting 06/27/2018 130*    Calcium 06/27/2018 9 0     eGFR 06/27/2018 47     WBC 06/27/2018 6 24     RBC 06/27/2018 3 60*    Hemoglobin 06/27/2018 10 8*    Hematocrit 06/27/2018 34 7*    MCV 06/27/2018 96     MCH 06/27/2018 30 0     MCHC 06/27/2018 31 1*    RDW 06/27/2018 14 4     Platelets 09/83/1547 158     MPV 06/27/2018 11 0     Total Bilirubin 06/27/2018 0 56     Bilirubin, Direct 06/27/2018 0 18     Alkaline Phosphatase 06/27/2018 65     AST 06/27/2018 25     ALT 06/27/2018 24     Total Protein 06/27/2018 7 5     Albumin 06/27/2018 2 9*    TSH 3RD GENERATON 06/27/2018 2 570      Imaging: No results found  Review of Systems:  Review of Systems   Constitutional: Negative for activity change, fatigue and unexpected weight change  HENT: Positive for trouble swallowing  Negative for nosebleeds  Eyes: Negative for visual disturbance  Respiratory: Negative for apnea, shortness of breath, wheezing and stridor  Cardiovascular: Negative for chest pain, palpitations and leg swelling  Gastrointestinal: Negative for abdominal pain and blood in stool  Endocrine: Negative for cold intolerance  Genitourinary: Negative for hematuria  Musculoskeletal: Negative for arthralgias and myalgias  Skin: Negative for pallor and rash  Allergic/Immunologic: Negative for immunocompromised state  Neurological: Negative for dizziness and syncope  Hematological: Does not bruise/bleed easily  Psychiatric/Behavioral: Positive for sleep disturbance  Negative for confusion  Physical Exam:  Physical Exam   Constitutional: He is oriented to person, place, and time  No distress  Eyes: No scleral icterus  Neck: No JVD present  Cardiovascular: Normal rate, regular rhythm and intact distal pulses  Exam reveals no gallop and no friction rub  Murmur (1/4 systolic ejection murmur) heard  Pulmonary/Chest: Effort normal and breath sounds normal  No respiratory distress  He has no wheezes  He has no rales  He exhibits no tenderness  Neurological: He is alert and oriented to person, place, and time  Skin: He is not diaphoretic  Psychiatric: He has a normal mood and affect  Discussion/Summary:  Coronary artery disease status post large inferolateral MI    Three with thrombolytic, rescue PTCA stent in 2001   Significant LV the dysfunction with improvement  Echocardiogram last year revealed ejection fraction of 45 to 50% with severe inferior hypokinesis  Stage I diastolic dysfunction and mild mitral insufficiency  Last stress test 2010 revealed lateral infarct with mild inferior ischemia managed medically  He is due for a stress test, he does not think he can walk on a treadmill, will do a pharmacological stress test   Lower extremity venous duplex last year revealed no evidence of DVT, triphasic waveforms  His LDL is rather low, will decrease statin therapy to only 4 mg a day and repeat lipid profile  Long first-degree AV block on EKG, unchanged  No history of bradyarrhythmias  Not on beta-blocker because of that  He has had occasional 2-1 Wenckebach AV block on previous Holter is

## 2018-11-28 ENCOUNTER — HOSPITAL ENCOUNTER (OUTPATIENT)
Dept: NON INVASIVE DIAGNOSTICS | Facility: CLINIC | Age: 78
Discharge: HOME | End: 2018-11-28
Payer: MEDICARE

## 2018-11-28 DIAGNOSIS — I25.10 CORONARY ARTERY DISEASE INVOLVING NATIVE CORONARY ARTERY OF NATIVE HEART WITHOUT ANGINA PECTORIS: ICD-10-CM

## 2018-11-28 DIAGNOSIS — E78.00 HYPERCHOLESTEROLEMIA: ICD-10-CM

## 2018-11-28 LAB
ARRHY DURING EX: NORMAL
CHEST PAIN STATEMENT: NORMAL
MAX DIASTOLIC BP: 62 MMHG
MAX HEART RATE: 94 BPM
MAX PREDICTED HEART RATE: 141 BPM
MAX. SYSTOLIC BP: 140 MMHG
PROTOCOL NAME: NORMAL
REASON FOR TERMINATION: NORMAL
TARGET HR FORMULA: NORMAL
TEST INDICATION: NORMAL
TIME IN EXERCISE PHASE: NORMAL

## 2018-11-28 PROCEDURE — A9502 TC99M TETROFOSMIN: HCPCS

## 2018-11-28 PROCEDURE — 93018 CV STRESS TEST I&R ONLY: CPT | Performed by: INTERNAL MEDICINE

## 2018-11-28 PROCEDURE — 93017 CV STRESS TEST TRACING ONLY: CPT

## 2018-11-28 PROCEDURE — 93016 CV STRESS TEST SUPVJ ONLY: CPT | Performed by: INTERNAL MEDICINE

## 2018-11-28 PROCEDURE — 78452 HT MUSCLE IMAGE SPECT MULT: CPT | Performed by: INTERNAL MEDICINE

## 2018-11-28 PROCEDURE — 78452 HT MUSCLE IMAGE SPECT MULT: CPT

## 2018-11-28 RX ADMIN — REGADENOSON 0.4 MG: 0.08 INJECTION, SOLUTION INTRAVENOUS at 09:50

## 2018-12-03 ENCOUNTER — OFFICE VISIT (OUTPATIENT)
Dept: FAMILY MEDICINE CLINIC | Facility: CLINIC | Age: 78
End: 2018-12-03
Payer: MEDICARE

## 2018-12-03 ENCOUNTER — APPOINTMENT (OUTPATIENT)
Dept: RADIOLOGY | Facility: MEDICAL CENTER | Age: 78
End: 2018-12-03
Payer: MEDICARE

## 2018-12-03 VITALS
SYSTOLIC BLOOD PRESSURE: 128 MMHG | TEMPERATURE: 98.1 F | HEIGHT: 69 IN | WEIGHT: 147.2 LBS | HEART RATE: 84 BPM | BODY MASS INDEX: 21.8 KG/M2 | DIASTOLIC BLOOD PRESSURE: 60 MMHG | OXYGEN SATURATION: 98 %

## 2018-12-03 DIAGNOSIS — R05.9 COUGH: ICD-10-CM

## 2018-12-03 DIAGNOSIS — I25.10 CORONARY ARTERY DISEASE INVOLVING NATIVE CORONARY ARTERY OF NATIVE HEART WITHOUT ANGINA PECTORIS: ICD-10-CM

## 2018-12-03 DIAGNOSIS — J40 BRONCHITIS: Primary | ICD-10-CM

## 2018-12-03 DIAGNOSIS — C15.9 CARCINOMA OF ESOPHAGUS (HCC): ICD-10-CM

## 2018-12-03 PROCEDURE — 71046 X-RAY EXAM CHEST 2 VIEWS: CPT

## 2018-12-03 PROCEDURE — 99214 OFFICE O/P EST MOD 30 MIN: CPT | Performed by: FAMILY MEDICINE

## 2018-12-03 RX ORDER — CEFDINIR 300 MG/1
300 CAPSULE ORAL EVERY 12 HOURS SCHEDULED
Qty: 20 CAPSULE | Refills: 0 | Status: SHIPPED | OUTPATIENT
Start: 2018-12-03 | End: 2018-12-13

## 2018-12-03 NOTE — PROGRESS NOTES
Assessment/Plan: patient here today for fever of 101 on 12/01/2018, runny nose,nausea and loss of appetite     No problem-specific Assessment & Plan notes found for this encounter  Diagnoses and all orders for this visit:    Bronchitis  -     cefdinir (OMNICEF) 300 mg capsule; Take 1 capsule (300 mg total) by mouth every 12 (twelve) hours for 10 days    Cough  -     cefdinir (OMNICEF) 300 mg capsule; Take 1 capsule (300 mg total) by mouth every 12 (twelve) hours for 10 days  -     XR chest pa & lateral; Future    Carcinoma of esophagus (HCC)    Coronary artery disease involving native coronary artery of native heart without angina pectoris          Subjective:      Patient ID: Ankur Spivey is a 77 y o  male  Been sick since the weekend after Thanksgiving  Past weekend thought getting better then temp spiked to 101 Saturday, loss of appetite, coughing, losing voice from coughing  Temp last pm 99 2  Normal temp 97 2 to 97 6  Lower ribs hurt from coughing  Denies abdominal complaints          The following portions of the patient's history were reviewed and updated as appropriate: allergies, current medications, past family history, past medical history, past social history, past surgical history and problem list     Current Outpatient Prescriptions:     allopurinol (ZYLOPRIM) 100 mg tablet, Take 1 tablet by mouth 2 (two) times a day, Disp: , Rfl:     aspirin 81 MG tablet, Take 2 tablets by mouth, Disp: , Rfl:     atorvastatin (LIPITOR) 40 mg tablet, Take 40 mg by mouth daily  , Disp: , Rfl:     cyanocobalamin (VITAMIN B-12) 100 mcg tablet, Take by mouth daily  , Disp: , Rfl:     ergocalciferol (VITAMIN D2) 50,000 units, , Disp: , Rfl:     Ferrous Sulfate (IRON) 325 (65 Fe) MG TABS, Take 1 tablet by mouth daily, Disp: , Rfl:     finasteride (PROSCAR) 5 mg tablet, Take 1 tablet (5 mg total) by mouth daily, Disp: 90 tablet, Rfl: 1    furosemide (LASIX) 20 mg tablet, Take 20 mg by mouth as needed  , Disp: , Rfl:     KLOR-CON 10 10 MEQ tablet, Take 10 mEq by mouth daily  , Disp: , Rfl:     Magnesium Oxide 400 MG CAPS, Take 3 capsules by mouth daily  , Disp: , Rfl:     nitroglycerin (NITROLINGUAL) 0 4 mg/spray spray, Place 1 spray under the tongue every 5 (five) minutes as needed for chest pain, Disp: 12 g, Rfl: 5    polyethylene glycol-propylene glycol (SYSTANE ULTRA) 0 4-0 3 %, Apply 1 drop to eye daily, Disp: , Rfl:     cefdinir (OMNICEF) 300 mg capsule, Take 1 capsule (300 mg total) by mouth every 12 (twelve) hours for 10 days, Disp: 20 capsule, Rfl: 0    Review of Systems   Constitutional: Positive for appetite change, chills, fatigue and fever  HENT: Negative  Eyes: Negative  Respiratory: Positive for cough and shortness of breath  Mild SOB at rest    Cardiovascular: Negative  Gastrointestinal: Negative for abdominal pain, constipation, diarrhea and nausea  Genitourinary: Negative  Musculoskeletal: Negative  Skin: Negative  Neurological: Negative  Psychiatric/Behavioral: Negative  Objective:      /60 (BP Location: Left arm, Patient Position: Sitting, Cuff Size: Standard)   Pulse 84   Temp 98 1 °F (36 7 °C) (Oral)   Ht 5' 9" (1 753 m)   Wt 66 8 kg (147 lb 3 2 oz)   SpO2 98%   BMI 21 74 kg/m²          Physical Exam   Constitutional: He is oriented to person, place, and time  He appears well-developed and well-nourished  HENT:   Head: Normocephalic and atraumatic  Right Ear: External ear normal    Left Ear: External ear normal    Nose: Nose normal    Mouth/Throat: Oropharynx is clear and moist    Eyes: Pupils are equal, round, and reactive to light  Conjunctivae and EOM are normal    Neck: Normal range of motion  Neck supple  Cardiovascular: Normal rate, regular rhythm, normal heart sounds and intact distal pulses  Pulmonary/Chest: Effort normal    Mild harsh sounds right lung  Abdominal: Soft   Bowel sounds are normal    Neurological: He is alert and oriented to person, place, and time  He has normal reflexes  Skin: Skin is warm and dry  Psychiatric: He has a normal mood and affect   His behavior is normal  Judgment and thought content normal

## 2018-12-07 ENCOUNTER — TELEPHONE (OUTPATIENT)
Dept: FAMILY MEDICINE CLINIC | Facility: CLINIC | Age: 78
End: 2018-12-07

## 2018-12-11 ENCOUNTER — OFFICE VISIT (OUTPATIENT)
Dept: FAMILY MEDICINE CLINIC | Facility: CLINIC | Age: 78
End: 2018-12-11
Payer: MEDICARE

## 2018-12-11 VITALS
BODY MASS INDEX: 21.89 KG/M2 | HEIGHT: 69 IN | SYSTOLIC BLOOD PRESSURE: 114 MMHG | DIASTOLIC BLOOD PRESSURE: 62 MMHG | WEIGHT: 147.8 LBS | OXYGEN SATURATION: 96 % | TEMPERATURE: 97.7 F | HEART RATE: 84 BPM

## 2018-12-11 DIAGNOSIS — J40 BRONCHITIS: Primary | ICD-10-CM

## 2018-12-11 PROCEDURE — 99213 OFFICE O/P EST LOW 20 MIN: CPT | Performed by: FAMILY MEDICINE

## 2018-12-11 NOTE — PROGRESS NOTES
Assessment/Plan: patient here today for follow up to review chest Xray     No problem-specific Assessment & Plan notes found for this encounter  Diagnoses and all orders for this visit:    Bronchitis          Subjective:      Patient ID: Cori Henderson is a 77 y o  male  Follow up, feeling better  XR chest was negative, no pneumonia  Eating, + appetite returning  No diarrhea  The following portions of the patient's history were reviewed and updated as appropriate: allergies, current medications, past family history, past medical history, past social history, past surgical history and problem list     Current Outpatient Prescriptions:     allopurinol (ZYLOPRIM) 100 mg tablet, Take 1 tablet by mouth 2 (two) times a day, Disp: , Rfl:     aspirin 81 MG tablet, Take 2 tablets by mouth, Disp: , Rfl:     atorvastatin (LIPITOR) 40 mg tablet, Take 40 mg by mouth daily  , Disp: , Rfl:     cefdinir (OMNICEF) 300 mg capsule, Take 1 capsule (300 mg total) by mouth every 12 (twelve) hours for 10 days, Disp: 20 capsule, Rfl: 0    cyanocobalamin (VITAMIN B-12) 100 mcg tablet, Take by mouth daily  , Disp: , Rfl:     ergocalciferol (VITAMIN D2) 50,000 units, , Disp: , Rfl:     finasteride (PROSCAR) 5 mg tablet, Take 1 tablet (5 mg total) by mouth daily, Disp: 90 tablet, Rfl: 1    furosemide (LASIX) 20 mg tablet, Take 20 mg by mouth as needed  , Disp: , Rfl:     KLOR-CON 10 10 MEQ tablet, Take 10 mEq by mouth daily  , Disp: , Rfl:     Magnesium Oxide 400 MG CAPS, Take 3 capsules by mouth daily  , Disp: , Rfl:     nitroglycerin (NITROLINGUAL) 0 4 mg/spray spray, Place 1 spray under the tongue every 5 (five) minutes as needed for chest pain, Disp: 12 g, Rfl: 5    polyethylene glycol-propylene glycol (SYSTANE ULTRA) 0 4-0 3 %, Apply 1 drop to eye daily, Disp: , Rfl:     Ferrous Sulfate (IRON) 325 (65 Fe) MG TABS, Take 1 tablet by mouth daily, Disp: , Rfl:     Review of Systems   Constitutional: Negative  HENT: Negative  Eyes: Negative  Respiratory: Negative  Cardiovascular: Negative  Gastrointestinal: Negative  Genitourinary: Negative  Musculoskeletal: Negative  Skin: Negative  Neurological: Negative  Psychiatric/Behavioral: Negative  Objective:      /62 (BP Location: Left arm, Patient Position: Sitting, Cuff Size: Standard)   Pulse 84   Temp 97 7 °F (36 5 °C) (Oral)   Ht 5' 9" (1 753 m)   Wt 67 kg (147 lb 12 8 oz)   SpO2 96%   BMI 21 83 kg/m²          Physical Exam   Constitutional: He is oriented to person, place, and time  He appears well-developed and well-nourished  HENT:   Head: Normocephalic and atraumatic  Right Ear: External ear normal    Left Ear: External ear normal    Nose: Nose normal    Mouth/Throat: Oropharynx is clear and moist    Eyes: Pupils are equal, round, and reactive to light  Conjunctivae and EOM are normal    Neck: Normal range of motion  Neck supple  Cardiovascular: Normal rate, regular rhythm and normal heart sounds  Pulmonary/Chest: Effort normal and breath sounds normal    Neurological: He is alert and oriented to person, place, and time  Skin: Skin is warm and dry  Psychiatric: He has a normal mood and affect   His behavior is normal  Judgment and thought content normal

## 2018-12-19 ENCOUNTER — APPOINTMENT (OUTPATIENT)
Dept: LAB | Facility: MEDICAL CENTER | Age: 78
End: 2018-12-19
Payer: MEDICARE

## 2018-12-19 DIAGNOSIS — D64.9 LOW HEMOGLOBIN: ICD-10-CM

## 2018-12-19 DIAGNOSIS — I25.10 CORONARY ARTERY DISEASE INVOLVING NATIVE CORONARY ARTERY OF NATIVE HEART WITHOUT ANGINA PECTORIS: ICD-10-CM

## 2018-12-19 DIAGNOSIS — E78.00 HYPERCHOLESTEROLEMIA: ICD-10-CM

## 2018-12-19 DIAGNOSIS — E87.6 HYPOKALEMIA: ICD-10-CM

## 2018-12-19 LAB
ANION GAP SERPL CALCULATED.3IONS-SCNC: 6 MMOL/L (ref 4–13)
BUN SERPL-MCNC: 20 MG/DL (ref 5–25)
CALCIUM SERPL-MCNC: 9.3 MG/DL (ref 8.3–10.1)
CHLORIDE SERPL-SCNC: 105 MMOL/L (ref 100–108)
CHOLEST SERPL-MCNC: 103 MG/DL (ref 50–200)
CO2 SERPL-SCNC: 26 MMOL/L (ref 21–32)
CREAT SERPL-MCNC: 1.45 MG/DL (ref 0.6–1.3)
ERYTHROCYTE [DISTWIDTH] IN BLOOD BY AUTOMATED COUNT: 14.6 % (ref 11.6–15.1)
GFR SERPL CREATININE-BSD FRML MDRD: 45 ML/MIN/1.73SQ M
GLUCOSE P FAST SERPL-MCNC: 127 MG/DL (ref 65–99)
HCT VFR BLD AUTO: 34.8 % (ref 36.5–49.3)
HDLC SERPL-MCNC: 58 MG/DL (ref 40–60)
HGB BLD-MCNC: 10.9 G/DL (ref 12–17)
LDLC SERPL CALC-MCNC: 32 MG/DL (ref 0–100)
MCH RBC QN AUTO: 29 PG (ref 26.8–34.3)
MCHC RBC AUTO-ENTMCNC: 31.3 G/DL (ref 31.4–37.4)
MCV RBC AUTO: 93 FL (ref 82–98)
PLATELET # BLD AUTO: 231 THOUSANDS/UL (ref 149–390)
PMV BLD AUTO: 10.8 FL (ref 8.9–12.7)
POTASSIUM SERPL-SCNC: 4.6 MMOL/L (ref 3.5–5.3)
RBC # BLD AUTO: 3.76 MILLION/UL (ref 3.88–5.62)
SODIUM SERPL-SCNC: 137 MMOL/L (ref 136–145)
TRIGL SERPL-MCNC: 64 MG/DL
WBC # BLD AUTO: 6.69 THOUSAND/UL (ref 4.31–10.16)

## 2018-12-19 PROCEDURE — 80061 LIPID PANEL: CPT

## 2018-12-19 PROCEDURE — 80048 BASIC METABOLIC PNL TOTAL CA: CPT

## 2018-12-19 PROCEDURE — 85027 COMPLETE CBC AUTOMATED: CPT

## 2018-12-19 PROCEDURE — 36415 COLL VENOUS BLD VENIPUNCTURE: CPT

## 2019-01-15 PROBLEM — Z85.09: Status: ACTIVE | Noted: 2018-05-15

## 2019-01-15 PROBLEM — Z08 ENCOUNTER FOR FOLLOW-UP EXAMINATION AFTER COMPLETED TREATMENT FOR MALIGNANT NEOPLASM: Status: ACTIVE | Noted: 2019-01-15

## 2019-01-18 PROBLEM — Z92.21 HISTORY OF CHEMOTHERAPY: Status: ACTIVE | Noted: 2019-01-18

## 2019-01-22 ENCOUNTER — OFFICE VISIT (OUTPATIENT)
Dept: SURGICAL ONCOLOGY | Facility: CLINIC | Age: 79
End: 2019-01-22
Payer: MEDICARE

## 2019-01-22 VITALS
WEIGHT: 152 LBS | BODY MASS INDEX: 22.51 KG/M2 | TEMPERATURE: 97.6 F | HEIGHT: 69 IN | SYSTOLIC BLOOD PRESSURE: 102 MMHG | HEART RATE: 87 BPM | RESPIRATION RATE: 15 BRPM | DIASTOLIC BLOOD PRESSURE: 50 MMHG

## 2019-01-22 DIAGNOSIS — Z08 ENCOUNTER FOR FOLLOW-UP EXAMINATION AFTER COMPLETED TREATMENT FOR MALIGNANT NEOPLASM: Primary | ICD-10-CM

## 2019-01-22 DIAGNOSIS — Z85.01 PERSONAL HISTORY OF MALIGNANT NEOPLASM OF ESOPHAGUS: ICD-10-CM

## 2019-01-22 DIAGNOSIS — Z85.09 PERSONAL HISTORY OF MALIGNANT NEOPLASM OF GALLBLADDER: ICD-10-CM

## 2019-01-22 PROCEDURE — 99213 OFFICE O/P EST LOW 20 MIN: CPT | Performed by: SURGERY

## 2019-01-22 NOTE — LETTER
January 22, 2019     Antonia Glover DO  [address] [facility] [address]    Patient: Katja Beverly   YOB: 1940   Date of Visit: 1/22/2019       Dear Dr Fermin Franks: Thank you for referring Shy Montanez to me for evaluation  Below are my notes for this consultation  If you have questions, please do not hesitate to call me  I look forward to following your patient along with you  Sincerely,        Yaneli Marquez MD        CC: MD Mynor Issa MD Davee Picking, MD Michial Lob, MD  1/22/2019  9:12 AM  Sign at close encounter               Surgical Oncology Follow Up       [address] [place] [address] [address]red  1940  [id number]  [address] [facility]  [facility] [place] [id number]    Diagnoses and all orders for this visit:    Encounter for follow-up examination after completed treatment for malignant neoplasm    Personal history of malignant neoplasm of esophagus    Personal history of malignant neoplasm of gallbladder        Chief Complaint   Patient presents with    Follow-up     Pt here for a 6M carcinoma of esophagus F/U       Return in about 6 months (around 7/22/2019)  Personal history of malignant neoplasm of esophagus    11/11/2014 Initial Diagnosis     Carcinoma of esophagus ([facility] [address] )         11/11/2014 Biopsy     GE junction biopsy and distal esophageal biopsy - 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  The tumor is histologically dissimilar to the patient's previously diagnosed adenocarcinoma of the gallbladder         11/24/2014 - 11/26/2014 Chemotherapy     Etoposide with carboplatin         12/17/2014 - 1/20/2015 Radiation     Distal esophagus to 4140 cGy         12/17/2014 - 1/20/2015 Chemotherapy     Concurrent weekly Taxol and carboplatin  1/13/15 had reaction to carboplatin and it was stopped  Taxol only aftyer that         3/4/2015 Surgery     Esophagectomy by Dr Stephon Horn         6/25/2015 - 7/29/2015 Chemotherapy     Temodar           Personal history of malignant neoplasm of gallbladder    5/17/2005 Initial Diagnosis     Malignant neoplasm of gallbladder ([facility] [address] )         7/2005 Surgery     Cholecystectomy followed by RFA         8/1/2005 - 9/2/2005 Radiation     Right abdomen/gallbladder area to 4500 cGy         8/1/2005 - 9/6/2005 Chemotherapy     Continuous infusion 5FU with radiation         4/2007 Progression     Recurrent disease         5/30/2007 - 1/30/2008 Chemotherapy     Gemcitabine/cisplatin/carboplatin         2/21/2008 Surgery     Resection of liver lesions and hannah hepatic mass         3/26/2008 - 8/20/2008 Chemotherapy     Gemcitabine/carboplatin            Diagnosis and Staging: Gallbladder cancer: T2N0M0   initially treated 2005  Recurrence treated February 2008  [id number] 30cm and 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  [id number] esophageal cancer March 2015   Treatment History: Radical cholecystectomy with liver resection 2005  Recurrence treated with resection of segment 5 lesion in 2008  Carboplatin and etoposide November 2014 for his esophageal carcinoma  Radiation completed January 20, 2015  Transhiatal esophagectomy, March 2015  Xeloda/Temodar   Current Therapy: Observation   Disease Status: MILTON     History of Present Illness:  Patient returns in follow-up after his esophagectomy  He continues to do well  He is maintaining his weight  He denies any abdominal pain, nausea, vomiting  He has not had a recent CT since July  No dysphagia  He is eating all food without any difficulty  Review of Systems  Complete ROS Surg Onc:   Complete ROS Surg Onc:   Constitutional: The patient denies new or recent history of general fatigue, no recent weight loss, no change in appetite     Eyes: No complaints of visual problems, no scleral icterus  ENT: no complaints of ear pain, no hoarseness, no difficulty swallowing,  no tinnitus and no new masses in head, oral cavity, or neck  Cardiovascular: No complaints of chest pain, no palpitations, no ankle edema  Respiratory: No complaints of shortness of breath, no cough  Gastrointestinal: No complaints of jaundice, no bloody stools, no pale stools  Genitourinary: No complaints of dysuria, no hematuria, no nocturia, no frequent urination, no urethral discharge  Musculoskeletal: No complaints of weakness, paralysis, joint stiffness or arthralgias  Integumentary: No complaints of rash, no new lesions  Neurological: No complaints of convulsions, no seizures, no dizziness  Hematologic/Lymphatic: No complaints of easy bruising  Endocrine:  No hot or cold intolerance  No polydipsia, polyphagia, or polyuria  Allergy/immunology:  No environmental allergies  No food allergies  Not immunocompromised  Skin:  No pallor or rash  No wound          Patient Active Problem List   Diagnosis    Personal history of malignant neoplasm of esophagus    Personal history of malignant neoplasm of gallbladder    Type 2 diabetes mellitus ([place] [address] )    Hypertension, essential, benign    Coronary artery disease involving native coronary artery of native heart without angina pectoris    Hypercholesterolemia    Encounter for follow-up examination after completed treatment for malignant neoplasm    History of chemotherapy     Past Medical History:   Diagnosis Date    Abnormal EKG     Acute myocardial infarction ([place] [address] )     Dry skin     LAST ASSESSED: [id number]    History of chemotherapy     History of external beam radiation therapy     Hypotension     Hypoxia     Liver cancer ([place] [address] )     Puncture wound of thumb     LAST ASSESSED: [id number]    Superficial injury of foot     LAST ASSESSED: [id number]    Tachycardia     Type 2 diabetes mellitus ([place] [address] )     LAST ASSESSED: [id number]    Vitamin D deficiency     LAST ASSESSED: [id number]     Past Surgical History:   Procedure Laterality Date    ANGIOPLASTY      PREVIOUS LASER ANGIOPLASTY WITH STENT PLACEMENT    CATARACT EXTRACTION W/  INTRAOCULAR LENS IMPLANT Bilateral     CHOLECYSTECTOMY      CORONARY ANGIOPLASTY WITH STENT PLACEMENT      PLACEMENT OF STENT 1    GALLBLADDER SURGERY      HIP SURGERY      TONSILLECTOMY       Family History   Problem Relation Age of Onset    Leukemia Mother     Lymphoma Son     Lymphoma Son     No Known Problems Father      Social History     Social History    Marital status:       Spouse name: Latoya Way Number of children: N/A    Years of education: N/A     Occupational History    Retired      Social History Main Topics    Smoking status: Former Smoker     Quit date: 2/15/1975    Smokeless tobacco: Never Used      Comment: FORMER SMOKER - SMOKED 2 PPD FOR 20 YRS AND QUIT 2/15/1975 AS PER [employer]    Alcohol use No      Comment: (HISTORY) RARELY AS PER [employer]TS    Drug use: No    Sexual activity: Not on file     Other Topics Concern    Not on file     Social History Narrative    No narrative on file       Current Outpatient Prescriptions:     allopurinol (ZYLOPRIM) 100 mg tablet, Take 1 tablet by mouth 2 (two) times a day, Disp: , Rfl:     aspirin 81 MG tablet, Take 2 tablets by mouth, Disp: , Rfl:     atorvastatin (LIPITOR) 40 mg tablet, Take 40 mg by mouth daily  , Disp: , Rfl:     cyanocobalamin (VITAMIN B-12) 100 mcg tablet, Take by mouth daily  , Disp: , Rfl:     ergocalciferol (VITAMIN D2) 50,000 units, , Disp: , Rfl:     Ferrous Sulfate (IRON) 325 (65 Fe) MG TABS, Take 1 tablet by mouth daily, Disp: , Rfl:     finasteride (PROSCAR) 5 mg tablet, Take 1 tablet (5 mg total) by mouth daily, Disp: 90 tablet, Rfl: 1    furosemide (LASIX) 20 mg tablet, Take 20 mg by mouth as needed  , Disp: , Rfl:     KLOR-CON 10 10 MEQ tablet, Take 10 mEq by mouth daily  , Disp: , Rfl:    Magnesium Oxide 400 MG CAPS, Take 3 capsules by mouth daily  , Disp: , Rfl:     nitroglycerin (NITROLINGUAL) 0 4 mg/spray spray, Place 1 spray under the tongue every 5 (five) minutes as needed for chest pain, Disp: 12 g, Rfl: 5    polyethylene glycol-propylene glycol (SYSTANE ULTRA) 0 4-0 3 %, Apply 1 drop to eye daily, Disp: , Rfl:   Allergies   Allergen Reactions    Carboplatin Facial Swelling and Rash     Category: Allergy;      Vitals:    01/22/19 0858   BP: 102/50   Pulse: 87   Resp: 15   Temp: 97 6 °F (36 4 °C)       Physical Exam  Constitutional: General appearance: The Patient is well-developed and well-nourished who appears the stated age in no acute distress  Patient is pleasant and talkative  HEENT:  Normocephalic  Sclerae are anicteric  Mucous membranes are moist  Neck is supple without adenopathy  No JVD  Chest: The lungs are clear to auscultation  Cardiac: Heart is regular rate  Abdomen: Abdomen is soft, non-tender, non-distended and without masses  Extremities: There is no clubbing or cyanosis  There is no edema  Symmetric  Neuro: Grossly nonfocal  Gait is normal      Lymphatic: No evidence of cervical adenopathy bilaterally  No evidence of axillary adenopathy bilaterally  No evidence of inguinal adenopathy bilaterally  Skin: Warm, anicteric  Psych:  Patient is pleasant and talkative  Breasts:        Pathology:  [unfilled]    Labs:      Imaging  No results found  I reviewed the above laboratory and imaging data  Discussion/Summary: 29-year-old male with a remote history of gallbladder carcinoma and now with a [id number] esophageal carcinoma  The initial pathology revealed a neuroendocrine carcinoma, but the surgical pathology on the resection revealed adenocarcinoma with neuroendocrine features  At this time he is doing relatively well  He is clinically MILTON from his esophageal cancer at over 4 years and 11 years from his gallbladder cancer recurrence   He will continue his current regimen of nutritional supplements to maintain his weight  I will see him again in 6 months for another exam  His imaging is usually ordered by his medical oncologist so we will await the next set of imaging  He will continue endoscopic surveillance with his gastroenterologist  He is agreeable to this   All his questions were answered

## 2019-02-05 ENCOUNTER — LAB REQUISITION (OUTPATIENT)
Dept: LAB | Facility: HOSPITAL | Age: 79
End: 2019-02-05
Payer: MEDICARE

## 2019-02-05 DIAGNOSIS — B35.6 TINEA CRURIS: Primary | ICD-10-CM

## 2019-02-05 DIAGNOSIS — N40.0 ENLARGED PROSTATE: ICD-10-CM

## 2019-02-05 DIAGNOSIS — C7A.1 MALIGNANT POORLY DIFFERENTIATED NEUROENDOCRINE TUMORS (HCC): ICD-10-CM

## 2019-02-05 DIAGNOSIS — C23 MALIGNANT NEOPLASM OF GALLBLADDER (HCC): ICD-10-CM

## 2019-02-05 LAB
AFP-TM SERPL-MCNC: 1.1 NG/ML (ref 0.5–8)
BILIRUB UR QL STRIP: NEGATIVE
CLARITY UR: CLEAR
COLOR UR: YELLOW
GLUCOSE UR STRIP-MCNC: ABNORMAL MG/DL
HGB UR QL STRIP.AUTO: NEGATIVE
KETONES UR STRIP-MCNC: NEGATIVE MG/DL
LEUKOCYTE ESTERASE UR QL STRIP: NEGATIVE
NITRITE UR QL STRIP: NEGATIVE
PH UR STRIP.AUTO: 5.5 [PH] (ref 4.5–8)
PROT UR STRIP-MCNC: NEGATIVE MG/DL
RETICS # AUTO: NORMAL 10*3/UL (ref 14356–105094)
RETICS # CALC: 1.67 % (ref 0.37–1.87)
SP GR UR STRIP.AUTO: 1.01 (ref 1–1.03)
UROBILINOGEN UR QL STRIP.AUTO: 0.2 E.U./DL

## 2019-02-05 PROCEDURE — 82668 ASSAY OF ERYTHROPOIETIN: CPT | Performed by: NURSE PRACTITIONER

## 2019-02-05 PROCEDURE — 82105 ALPHA-FETOPROTEIN SERUM: CPT | Performed by: NURSE PRACTITIONER

## 2019-02-05 PROCEDURE — 86334 IMMUNOFIX E-PHORESIS SERUM: CPT | Performed by: NURSE PRACTITIONER

## 2019-02-05 PROCEDURE — 84165 PROTEIN E-PHORESIS SERUM: CPT | Performed by: PATHOLOGY

## 2019-02-05 PROCEDURE — 86334 IMMUNOFIX E-PHORESIS SERUM: CPT | Performed by: PATHOLOGY

## 2019-02-05 PROCEDURE — 83519 RIA NONANTIBODY: CPT | Performed by: NURSE PRACTITIONER

## 2019-02-05 PROCEDURE — 81003 URINALYSIS AUTO W/O SCOPE: CPT | Performed by: NURSE PRACTITIONER

## 2019-02-05 PROCEDURE — 85045 AUTOMATED RETICULOCYTE COUNT: CPT | Performed by: NURSE PRACTITIONER

## 2019-02-05 PROCEDURE — 84165 PROTEIN E-PHORESIS SERUM: CPT | Performed by: NURSE PRACTITIONER

## 2019-02-05 RX ORDER — FINASTERIDE 5 MG/1
5 TABLET, FILM COATED ORAL DAILY
Qty: 90 TABLET | Refills: 1 | Status: SHIPPED | OUTPATIENT
Start: 2019-02-05 | End: 2019-11-05

## 2019-02-05 RX ORDER — NYSTATIN 100000 [USP'U]/G
POWDER TOPICAL 3 TIMES DAILY
Qty: 15 G | Refills: 0 | Status: SHIPPED | OUTPATIENT
Start: 2019-02-05 | End: 2020-11-09 | Stop reason: HOSPADM

## 2019-02-05 NOTE — TELEPHONE ENCOUNTER
1  Pt requested refill of Finasteride 5 mg   2  Patient requesting a refill of Mycostatin powder 100,000 units use to three times a day Disp # 15 grams  Last time it was refilled was several years ago but patient c/o yeast problem again  Patient would like refills to be printed since he takes it to the [facility] to get filled

## 2019-02-06 LAB
ALBUMIN SERPL ELPH-MCNC: 2.85 G/DL (ref 3.5–5)
ALBUMIN SERPL ELPH-MCNC: 43.2 % (ref 52–65)
ALPHA1 GLOB SERPL ELPH-MCNC: 0.27 G/DL (ref 0.1–0.4)
ALPHA1 GLOB SERPL ELPH-MCNC: 4.1 % (ref 2.5–5)
ALPHA2 GLOB SERPL ELPH-MCNC: 0.71 G/DL (ref 0.4–1.2)
ALPHA2 GLOB SERPL ELPH-MCNC: 10.8 % (ref 7–13)
BETA GLOB ABNORMAL SERPL ELPH-MCNC: 0.37 G/DL (ref 0.4–0.8)
BETA1 GLOB SERPL ELPH-MCNC: 5.6 % (ref 5–13)
BETA2 GLOB SERPL ELPH-MCNC: 7.8 % (ref 2–8)
BETA2+GAMMA GLOB SERPL ELPH-MCNC: 0.51 G/DL (ref 0.2–0.5)
EPO SERPL-ACNC: 23.3 MIU/ML (ref 2.6–18.5)
GAMMA GLOB ABNORMAL SERPL ELPH-MCNC: 1.88 G/DL (ref 0.5–1.6)
GAMMA GLOB SERPL ELPH-MCNC: 28.5 % (ref 12–22)
IGG/ALB SER: 0.76 {RATIO} (ref 1.1–1.8)
INTERPRETATION UR IFE-IMP: NORMAL
PROT SERPL-MCNC: 6.6 G/DL (ref 6.4–8.2)

## 2019-02-13 LAB — NSE SERPL IA-MCNC: 2.3 NG/ML (ref 0–12.5)

## 2019-04-04 ENCOUNTER — LAB REQUISITION (OUTPATIENT)
Dept: LAB | Facility: HOSPITAL | Age: 79
End: 2019-04-04
Payer: MEDICARE

## 2019-04-04 ENCOUNTER — TRANSCRIBE ORDERS (OUTPATIENT)
Dept: ADMINISTRATIVE | Facility: HOSPITAL | Age: 79
End: 2019-04-04

## 2019-04-04 DIAGNOSIS — R60.0 LOCALIZED EDEMA: Primary | ICD-10-CM

## 2019-04-04 DIAGNOSIS — D64.9 ANEMIA: ICD-10-CM

## 2019-04-04 DIAGNOSIS — C23 MALIGNANT NEOPLASM OF GALLBLADDER (HCC): ICD-10-CM

## 2019-04-04 DIAGNOSIS — C7A.1 MALIGNANT POORLY DIFFERENTIATED NEUROENDOCRINE TUMORS (HCC): ICD-10-CM

## 2019-04-04 DIAGNOSIS — D50.9 IRON DEFICIENCY ANEMIA: ICD-10-CM

## 2019-04-04 LAB — TRANSFERRIN SERPL-MCNC: 167 MG/DL (ref 200–400)

## 2019-04-04 PROCEDURE — 84466 ASSAY OF TRANSFERRIN: CPT | Performed by: NURSE PRACTITIONER

## 2019-04-04 PROCEDURE — 83520 IMMUNOASSAY QUANT NOS NONAB: CPT | Performed by: NURSE PRACTITIONER

## 2019-04-08 ENCOUNTER — HOSPITAL ENCOUNTER (OUTPATIENT)
Dept: NON INVASIVE DIAGNOSTICS | Facility: CLINIC | Age: 79
Discharge: HOME | End: 2019-04-08
Payer: MEDICARE

## 2019-04-08 DIAGNOSIS — R60.0 LOCALIZED EDEMA: ICD-10-CM

## 2019-04-08 LAB — STFR SERPL-SCNC: 18.8 NMOL/L (ref 12.2–27.3)

## 2019-04-08 PROCEDURE — 93970 EXTREMITY STUDY: CPT

## 2019-04-08 PROCEDURE — 93970 EXTREMITY STUDY: CPT | Performed by: SURGERY

## 2019-04-09 ENCOUNTER — HOSPITAL ENCOUNTER (OUTPATIENT)
Dept: RADIOLOGY | Age: 79
Discharge: HOME | End: 2019-04-09
Payer: MEDICARE

## 2019-04-09 DIAGNOSIS — C23 MALIGNANT NEOPLASM OF GALLBLADDER (HCC): ICD-10-CM

## 2019-04-09 DIAGNOSIS — R60.0 LOCALIZED EDEMA: ICD-10-CM

## 2019-04-09 PROCEDURE — 74174 CTA ABD&PLVS W/CONTRAST: CPT

## 2019-04-09 RX ADMIN — IODIXANOL 85 ML: 320 INJECTION, SOLUTION INTRAVASCULAR at 11:58

## 2019-05-02 ENCOUNTER — LAB REQUISITION (OUTPATIENT)
Dept: LAB | Facility: HOSPITAL | Age: 79
End: 2019-05-02
Payer: MEDICARE

## 2019-05-02 DIAGNOSIS — R60.9 EDEMA: ICD-10-CM

## 2019-05-02 LAB — NT-PROBNP SERPL-MCNC: 3574 PG/ML

## 2019-05-02 PROCEDURE — 83880 ASSAY OF NATRIURETIC PEPTIDE: CPT | Performed by: NURSE PRACTITIONER

## 2019-05-10 RX ORDER — FAMOTIDINE 20 MG/1
TABLET, FILM COATED ORAL AS NEEDED
COMMUNITY
Start: 2019-03-05 | End: 2020-11-02

## 2019-05-13 ENCOUNTER — CLINICAL SUPPORT (OUTPATIENT)
Dept: RADIATION ONCOLOGY | Facility: HOSPITAL | Age: 79
End: 2019-05-13
Attending: RADIOLOGY
Payer: MEDICARE

## 2019-05-13 VITALS
TEMPERATURE: 97.6 F | HEIGHT: 69 IN | SYSTOLIC BLOOD PRESSURE: 142 MMHG | HEART RATE: 56 BPM | WEIGHT: 155 LBS | DIASTOLIC BLOOD PRESSURE: 60 MMHG | BODY MASS INDEX: 22.96 KG/M2 | OXYGEN SATURATION: 99 % | RESPIRATION RATE: 16 BRPM

## 2019-05-13 DIAGNOSIS — Z85.01 PERSONAL HISTORY OF MALIGNANT NEOPLASM OF ESOPHAGUS: Primary | ICD-10-CM

## 2019-05-13 PROCEDURE — 99215 OFFICE O/P EST HI 40 MIN: CPT | Performed by: RADIOLOGY

## 2019-07-19 ENCOUNTER — LAB REQUISITION (OUTPATIENT)
Dept: LAB | Facility: HOSPITAL | Age: 79
End: 2019-07-19
Payer: MEDICARE

## 2019-07-19 DIAGNOSIS — I50.9 HEART FAILURE (HCC): ICD-10-CM

## 2019-07-19 DIAGNOSIS — C7A.1 MALIGNANT POORLY DIFFERENTIATED NEUROENDOCRINE TUMORS (HCC): ICD-10-CM

## 2019-07-19 LAB
ALBUMIN SERPL BCP-MCNC: 2.8 G/DL (ref 3.5–5)
ALP SERPL-CCNC: 84 U/L (ref 46–116)
ALT SERPL W P-5'-P-CCNC: 20 U/L (ref 12–78)
ANION GAP SERPL CALCULATED.3IONS-SCNC: 5 MMOL/L (ref 4–13)
AST SERPL W P-5'-P-CCNC: 19 U/L (ref 5–45)
BILIRUB SERPL-MCNC: 0.39 MG/DL (ref 0.2–1)
BUN SERPL-MCNC: 19 MG/DL (ref 5–25)
CALCIUM SERPL-MCNC: 8.6 MG/DL (ref 8.3–10.1)
CHLORIDE SERPL-SCNC: 102 MMOL/L (ref 100–108)
CO2 SERPL-SCNC: 26 MMOL/L (ref 21–32)
CREAT SERPL-MCNC: 1.36 MG/DL (ref 0.6–1.3)
GFR SERPL CREATININE-BSD FRML MDRD: 49 ML/MIN/1.73SQ M
GLUCOSE SERPL-MCNC: 157 MG/DL (ref 65–140)
IRON SATN MFR SERPL: 35 %
IRON SERPL-MCNC: 80 UG/DL (ref 65–175)
IRON SERPL-MCNC: 80 UG/DL (ref 65–175)
LDH SERPL-CCNC: 157 U/L (ref 81–234)
MAGNESIUM SERPL-MCNC: 1.6 MG/DL (ref 1.6–2.6)
POTASSIUM SERPL-SCNC: 4 MMOL/L (ref 3.5–5.3)
PROT SERPL-MCNC: 7.8 G/DL (ref 6.4–8.2)
SODIUM SERPL-SCNC: 133 MMOL/L (ref 136–145)
TIBC SERPL-MCNC: 231 UG/DL (ref 250–450)
TIBC SERPL-MCNC: 231 UG/DL (ref 250–450)

## 2019-07-19 PROCEDURE — 83540 ASSAY OF IRON: CPT | Performed by: SURGERY

## 2019-07-19 PROCEDURE — 83615 LACTATE (LD) (LDH) ENZYME: CPT | Performed by: SURGERY

## 2019-07-19 PROCEDURE — 80053 COMPREHEN METABOLIC PANEL: CPT | Performed by: SURGERY

## 2019-07-19 PROCEDURE — 83550 IRON BINDING TEST: CPT | Performed by: SURGERY

## 2019-07-19 PROCEDURE — 83735 ASSAY OF MAGNESIUM: CPT | Performed by: SURGERY

## 2019-07-23 ENCOUNTER — OFFICE VISIT (OUTPATIENT)
Dept: SURGICAL ONCOLOGY | Facility: CLINIC | Age: 79
End: 2019-07-23
Payer: MEDICARE

## 2019-07-23 ENCOUNTER — APPOINTMENT (OUTPATIENT)
Dept: LAB | Facility: CLINIC | Age: 79
End: 2019-07-23
Payer: MEDICARE

## 2019-07-23 VITALS
RESPIRATION RATE: 16 BRPM | DIASTOLIC BLOOD PRESSURE: 70 MMHG | BODY MASS INDEX: 21.18 KG/M2 | HEIGHT: 69 IN | WEIGHT: 143 LBS | SYSTOLIC BLOOD PRESSURE: 110 MMHG | HEART RATE: 62 BPM | TEMPERATURE: 97.6 F

## 2019-07-23 DIAGNOSIS — Z08 ENCOUNTER FOR FOLLOW-UP EXAMINATION AFTER COMPLETED TREATMENT FOR MALIGNANT NEOPLASM: ICD-10-CM

## 2019-07-23 DIAGNOSIS — Z85.09 PERSONAL HISTORY OF MALIGNANT NEOPLASM OF GALLBLADDER: ICD-10-CM

## 2019-07-23 DIAGNOSIS — Z85.01 PERSONAL HISTORY OF MALIGNANT NEOPLASM OF ESOPHAGUS: Primary | ICD-10-CM

## 2019-07-23 DIAGNOSIS — Z85.01 PERSONAL HISTORY OF MALIGNANT NEOPLASM OF ESOPHAGUS: ICD-10-CM

## 2019-07-23 PROCEDURE — 99213 OFFICE O/P EST LOW 20 MIN: CPT | Performed by: NURSE PRACTITIONER

## 2019-07-23 PROCEDURE — 82378 CARCINOEMBRYONIC ANTIGEN: CPT

## 2019-07-23 PROCEDURE — 36415 COLL VENOUS BLD VENIPUNCTURE: CPT

## 2019-07-23 NOTE — PROGRESS NOTES
Surgical Oncology Follow Up       [address] [facility]  [facility] [place]  [address]  [facility] [address] [place]  1940  [id number]      Chief Complaint   Patient presents with    Follow-up     Pt is here for 6 month  follow up        Assessment/Plan:  1  Personal history of malignant neoplasm of esophagus  - CEA; Future  - BUN; Future  - Creatinine, serum; Future  - CEA; Future  - CT chest abdomen pelvis w contrast; Future  - 6 mo f/u visit with Dr Gala Alvarez    2  Personal history of malignant neoplasm of gallbladder      3  Encounter for follow-up examination after completed treatment for malignant neoplasm      Discussion/Summary:  Patient is an 54-year-old male who presents today for a six-month follow-up visit for a esophageal cancer diagnosed in 2014  His initial pathology revealed a neuroendocrine carcinoma however surgical pathology revealed adenocarcinoma with neuroendocrine features  He underwent neoadjuvant chemo radiation and underwent an esophagectomy in March 2015  He also has a history of gallbladder carcinoma initially treated in 2005 and a recurrence in 2008  He had a CTA of the abdomen and pelvis performed on 4/19/2019 which revealed no evidence of metastatic disease, however suboptimal eval of liver parenchyma  His CEA and CA 19-9 from 10/2018 were WNL  He has no new complaints today  He does report chronic reflux  He states that his gastroenterologist is managing his reflux medication and does not see any need for any endoscopy at this time  I did encourage him to continue supplementing with protein drinks  He has been following with his medical oncologist for labs and imaging  However, I had do not see a recent CEA so I will repeat his CEA at this time  I will call him with any abnormal results  He is now approximately 4-1/2 years from his most recent diagnosis  I will repeat his CEA and CT scan in 6 months    We will see him back at that time  We did discuss that this may be his final visit with us as long as he continues to follow closely with his medical oncologist   Case discussed with Dr Terri Brittle  Patient was instructed call with any new concerns or symptoms prior to that time  All of the patient's questions were answered  History of Present Illness:        Personal history of malignant neoplasm of esophagus    11/11/2014 Initial Diagnosis     Carcinoma of esophagus ([facility] [address] )      11/11/2014 Biopsy     GE junction biopsy and distal esophageal biopsy - 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  The tumor is histologically dissimilar to the patient's previously diagnosed adenocarcinoma of the gallbladder      11/24/2014 - 11/26/2014 Chemotherapy     Etoposide with carboplatin      12/17/2014 - 1/20/2015 Radiation     Distal esophagus to 4140 cGy      12/17/2014 - 1/20/2015 Chemotherapy     Concurrent weekly Taxol and carboplatin  1/13/15 had reaction to carboplatin and it was stopped    Taxol only aftyer that      3/4/2015 Surgery     Esophagectomy by Dr Terri Brittle      6/25/2015 - 7/29/2015 Chemotherapy     Temodar        Personal history of malignant neoplasm of gallbladder    5/17/2005 Initial Diagnosis     Malignant neoplasm of gallbladder ([facility] [address] )      7/2005 Surgery     Cholecystectomy followed by RFA      8/1/2005 - 9/2/2005 Radiation     Right abdomen/gallbladder area to 4500 cGy      8/1/2005 - 9/6/2005 Chemotherapy     Continuous infusion 5FU with radiation      4/2007 Progression     Recurrent disease      5/30/2007 - 1/30/2008 Chemotherapy     Gemcitabine/cisplatin/carboplatin      2/21/2008 Surgery     Resection of liver lesions and hannah hepatic mass      3/26/2008 - 8/20/2008 Chemotherapy     Gemcitabine/carboplatin       Diagnosis and Staging: Gallbladder cancer: T2N0M0   initially treated 2005  Recurrence treated February 2008  [id number] 30cm and 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  [id number] esophageal cancer March 2015   Treatment History: Radical cholecystectomy with liver resection 2005  Recurrence treated with resection of segment 5 lesion in 2008  Carboplatin and etoposide November 2014 for his esophageal carcinoma  Radiation completed January 20, 2015  Transhiatal esophagectomy, March 2015  Xeloda/Temodar   Current Therapy: Observation   Disease Status: MILTON      -Interval History:  Patient presents today for six-month follow-up visit for esophageal cancer diagnosed in 2014  He had a CTA of the abdomen and pelvis performed by his medical oncologist in April which did not reveal evidence of metastatic disease  He states that he feels well  He states that his weight has dropped a little bit because he has not been drinking his protein drinks as he should  He also reports chronic reflux after his esophagectomy  He does take medication for this  He continues to follow with his gastroenterologist, Arturo Acosta on a routine basis  He also continues to follow with his medical oncologist, Dr Eladio Santiago on a routine basis  He has not had a recent CEA performed  Review of Systems:  Review of Systems   Constitutional: Positive for fatigue  Negative for appetite change, chills, diaphoresis, fever and unexpected weight change  Respiratory: Negative for cough, shortness of breath and wheezing  Cardiovascular: Negative for chest pain, palpitations and leg swelling  Gastrointestinal: Negative for abdominal pain, diarrhea, nausea and vomiting  Stable reflux s/p esophagectomy   Musculoskeletal: Negative for arthralgias, back pain and myalgias  Skin: Negative for rash  Neurological: Negative for light-headedness and headaches  Hematological: Negative for adenopathy  Psychiatric/Behavioral: Negative for agitation and confusion         Patient Active Problem List   Diagnosis    Personal history of malignant neoplasm of esophagus    Personal history of malignant neoplasm of gallbladder    Type 2 diabetes mellitus (HCC)    Hypertension, essential, benign    Coronary artery disease involving native coronary artery of native heart without angina pectoris    Hypercholesterolemia    Encounter for follow-up examination after completed treatment for malignant neoplasm    History of chemotherapy     Past Medical History:   Diagnosis Date    Abnormal EKG     Acute myocardial infarction ([facility] )     Dry skin     LAST ASSESSED: [id number]    History of chemotherapy     History of external beam radiation therapy     Hypotension     Hypoxia     Liver cancer (HCC)     MDS (myelodysplastic syndrome) ([facility] ) 2019    Puncture wound of thumb     LAST ASSESSED: [id number]    Superficial injury of foot     LAST ASSESSED: [id number]    Tachycardia     Type 2 diabetes mellitus (HCC)     LAST ASSESSED: [id number]    Vitamin D deficiency     LAST ASSESSED: [id number]     Past Surgical History:   Procedure Laterality Date    ANGIOPLASTY      PREVIOUS LASER ANGIOPLASTY WITH STENT PLACEMENT    BONE MARROW BIOPSY  2019    CATARACT EXTRACTION W/  INTRAOCULAR LENS IMPLANT Bilateral     CHOLECYSTECTOMY      CORONARY ANGIOPLASTY WITH STENT PLACEMENT      PLACEMENT OF STENT 1    GALLBLADDER SURGERY      HIP SURGERY      TONSILLECTOMY       Family History   Problem Relation Age of Onset    Leukemia Mother     Lymphoma Son     Lymphoma Son     No Known Problems Father      Social History     Socioeconomic History    Marital status:       Spouse name: Ila Hartley Number of children: 3    Years of education: Not on file    Highest education level: Not on file   Occupational History    Occupation: Retired   Social Needs    Financial resource strain: Not on file    Food insecurity:     Worry: Not on file     Inability: Not on file   [employer] needs:     Medical: Not on file     Non-medical: Not on file   Tobacco Use    Smoking status: Former Smoker     Last attempt to quit: 2/15/1975     Years since quittin 4    Smokeless tobacco: Never Used    Tobacco comment: FORMER SMOKER - SMOKED 2 PPD FOR 20 YRS AND QUIT 2/15/1975 AS PER [employer]   Substance and Sexual Activity    Alcohol use: No     Comment: (HISTORY) RARELY AS PER [employer]    Drug use: No    Sexual activity: Not on file   Lifestyle    Physical activity:     Days per week: Not on file     Minutes per session: Not on file    Stress: Not on file   Relationships    Social connections:     Talks on phone: Not on file     Gets together: Not on file     Attends [employer] service: Not on file     Active member of club or organization: Not on file     Attends meetings of clubs or organizations: Not on file     Relationship status: Not on file    Intimate partner violence:     Fear of current or ex partner: Not on file     Emotionally abused: Not on file     Physically abused: Not on file     Forced sexual activity: Not on file   Other Topics Concern    Not on file   Social History Narrative    Not on file       Current Outpatient Medications:     allopurinol (ZYLOPRIM) 100 mg tablet, Take 1 tablet by mouth 2 (two) times a day, Disp: , Rfl:     aspirin 81 MG tablet, Take 2 tablets by mouth, Disp: , Rfl:     atorvastatin (LIPITOR) 40 mg tablet, Take 40 mg by mouth daily  , Disp: , Rfl:     cyanocobalamin (VITAMIN B-12) 100 mcg tablet, Take by mouth daily  , Disp: , Rfl:     ergocalciferol (VITAMIN D2) 50,000 units, once a week , Disp: , Rfl:     famotidine (PEPCID) 20 mg tablet, 2 (two) times a day , Disp: , Rfl:     finasteride (PROSCAR) 5 mg tablet, Take 1 tablet (5 mg total) by mouth daily for 180 days, Disp: 90 tablet, Rfl: 1    furosemide (LASIX) 20 mg tablet, Take 20 mg by mouth as needed  , Disp: , Rfl:     KLOR-CON 10 10 MEQ tablet, Take 10 mEq by mouth as needed , Disp: , Rfl:     Magnesium Cl-Calcium Carbonate (SLOW-MAG PO), Take 72 mg by mouth daily, Disp: , Rfl:     Magnesium Oxide 400 MG CAPS, Take 8 capsules by mouth daily , Disp: , Rfl:     nitroglycerin (NITROLINGUAL) 0 4 mg/spray spray, Place 1 spray under the tongue every 5 (five) minutes as needed for chest pain, Disp: 12 g, Rfl: 5    polyethylene glycol-propylene glycol (SYSTANE ULTRA) 0 4-0 3 %, Apply 1 drop to eye daily, Disp: , Rfl:     nystatin (MYCOSTATIN) powder, Apply topically 3 (three) times a day for 30 days, Disp: 15 g, Rfl: 0  Allergies   Allergen Reactions    Carboplatin Facial Swelling and Rash     Category: Allergy;      Vitals:    07/23/19 0927   BP: 110/70   Pulse: 62   Resp: 16   Temp: 97 6 °F (36 4 °C)       Physical Exam   Constitutional: He is oriented to person, place, and time  He appears well-developed  No distress  HENT:   Head: Normocephalic and atraumatic  Neck: Normal range of motion  Cardiovascular: Normal rate, regular rhythm and normal heart sounds  Pulmonary/Chest: Effort normal and breath sounds normal    LCW port   Abdominal: Soft  Normal appearance  He exhibits no distension and no mass  There is no hepatosplenomegaly  There is no tenderness  Surgical scars present   Musculoskeletal: Normal range of motion  He exhibits no edema  Lymphadenopathy:     He has no axillary adenopathy  Right: No supraclavicular adenopathy present  Left: No supraclavicular adenopathy present  Neurological: He is alert and oriented to person, place, and time  Skin: Skin is warm and dry  No rash noted  Psychiatric: He has a normal mood and affect  Vitals reviewed  Advance Care Planning/Advance Directives:  Discussed disease status, cancer treatment plans and/or cancer treatment goals with the patient

## 2019-07-24 LAB — CEA SERPL-MCNC: 2.1 NG/ML (ref 0–3)

## 2019-09-26 ENCOUNTER — LAB REQUISITION (OUTPATIENT)
Dept: LAB | Facility: HOSPITAL | Age: 79
End: 2019-09-26
Payer: MEDICARE

## 2019-09-26 DIAGNOSIS — C23 MALIGNANT NEOPLASM OF GALLBLADDER (HCC): ICD-10-CM

## 2019-09-26 DIAGNOSIS — C7A.1 MALIGNANT POORLY DIFFERENTIATED NEUROENDOCRINE TUMORS (HCC): ICD-10-CM

## 2019-09-26 DIAGNOSIS — E83.42 HYPOMAGNESEMIA: ICD-10-CM

## 2019-09-26 LAB
IRON SATN MFR SERPL: 25 %
IRON SERPL-MCNC: 48 UG/DL (ref 65–175)
TIBC SERPL-MCNC: 195 UG/DL (ref 250–450)

## 2019-09-26 PROCEDURE — 83550 IRON BINDING TEST: CPT | Performed by: NURSE PRACTITIONER

## 2019-09-26 PROCEDURE — 83540 ASSAY OF IRON: CPT | Performed by: NURSE PRACTITIONER

## 2019-11-05 ENCOUNTER — OFFICE VISIT (OUTPATIENT)
Dept: CARDIOLOGY CLINIC | Facility: CLINIC | Age: 79
End: 2019-11-05
Payer: MEDICARE

## 2019-11-05 VITALS
BODY MASS INDEX: 21.03 KG/M2 | HEIGHT: 69 IN | SYSTOLIC BLOOD PRESSURE: 110 MMHG | HEART RATE: 70 BPM | DIASTOLIC BLOOD PRESSURE: 60 MMHG | WEIGHT: 142 LBS

## 2019-11-05 DIAGNOSIS — E78.00 HYPERCHOLESTEROLEMIA: ICD-10-CM

## 2019-11-05 DIAGNOSIS — I10 HYPERTENSION, ESSENTIAL, BENIGN: Primary | ICD-10-CM

## 2019-11-05 DIAGNOSIS — I25.10 CORONARY ARTERY DISEASE INVOLVING NATIVE CORONARY ARTERY OF NATIVE HEART WITHOUT ANGINA PECTORIS: ICD-10-CM

## 2019-11-05 PROCEDURE — 93000 ELECTROCARDIOGRAM COMPLETE: CPT | Performed by: INTERNAL MEDICINE

## 2019-11-05 PROCEDURE — 99214 OFFICE O/P EST MOD 30 MIN: CPT | Performed by: INTERNAL MEDICINE

## 2019-11-05 NOTE — PROGRESS NOTES
Cardiology Follow Up    Hernandez Ruano  1940  [id number]  [address]  [facility]  [place] [address]   [phone]    1  Hypertension, essential, benign  POCT ECG   2  Coronary artery disease involving native coronary artery of native heart without angina pectoris     3  Hypercholesterolemia         Interval History:   Cardiology follow-up  Patient doing well from a cardiac point of view  Denies any chest pain, mild dyspnea class 1 stent of the condition, he did have a BNP earlier this year that was significantly elevated at 3500  Denies any orthopnea or PND  States been compliant low-cholesterol diet, I do not have a recent lipid profile, LDL last year was 32 with an HDL 58  He is on medium intensity statin therapy  Recently diagnosed with mild dysplastic syndrome, receiving chemotherapy for that  No recurrence of his esophageal of gallbladder carcinoma      Patient Active Problem List   Diagnosis    Personal history of malignant neoplasm of esophagus    Personal history of malignant neoplasm of gallbladder    Type 2 diabetes mellitus ([facility] )    Hypertension, essential, benign    Coronary artery disease involving native coronary artery of native heart without angina pectoris    Hypercholesterolemia    Encounter for follow-up examination after completed treatment for malignant neoplasm    History of chemotherapy     Past Medical History:   Diagnosis Date    Abnormal EKG     Acute myocardial infarction ([facility] )     Dry skin     LAST ASSESSED: [id number]    History of chemotherapy     History of external beam radiation therapy     Hypotension     Hypoxia     Liver cancer ([facility] )     MDS [facility]myelodysplastic syndrome) [facility] 2019    Puncture wound of thumb     LAST ASSESSED: [id number]    Superficial injury of foot     LAST ASSESSED: [id number]    Tachycardia     Type 2 diabetes mellitus ([facility] )     LAST ASSESSED: [id number]    Vitamin D deficiency     LAST ASSESSED: [id number]     Social History     Socioeconomic History    Marital status:       Spouse name: Georgette Malloy Number of children: 3    Years of education: Not on file    Highest education level: Not on file   Occupational History    Occupation: Retired   Social Needs    Financial resource strain: Not on file    Food insecurity:     Worry: Not on file     Inability: Not on file   Adaptly needs:     Medical: Not on file     Non-medical: Not on file   Tobacco Use    Smoking status: Former Smoker     Last attempt to quit: 2/15/1975     Years since quittin 7    Smokeless tobacco: Never Used    Tobacco comment: FORMER SMOKER - SMOKED 2 PPD FOR 20 YRS AND QUIT 2/15/1975 AS PER [employer]   Substance and Sexual Activity    Alcohol use: No     Comment: (HISTORY) RARELY AS PER [employer]TS    Drug use: No    Sexual activity: Not on file   Lifestyle    Physical activity:     Days per week: Not on file     Minutes per session: Not on file    Stress: Not on file   Relationships    Social connections:     Talks on phone: Not on file     Gets together: Not on file     Attends [employer] service: Not on file     Active member of club or organization: Not on file     Attends meetings of clubs or organizations: Not on file     Relationship status: Not on file    Intimate partner violence:     Fear of current or ex partner: Not on file     Emotionally abused: Not on file     Physically abused: Not on file     Forced sexual activity: Not on file   Other Topics Concern    Not on file   Social History Narrative    Not on file      Family History   Problem Relation Age of Onset    Leukemia Mother     Lymphoma Son     Lymphoma Son     No Known Problems Father      Past Surgical History:   Procedure Laterality Date    ANGIOPLASTY      PREVIOUS LASER ANGIOPLASTY WITH STENT PLACEMENT    BONE MARROW BIOPSY  2019    CATARACT EXTRACTION W/ INTRAOCULAR LENS IMPLANT Bilateral     CHOLECYSTECTOMY      CORONARY ANGIOPLASTY WITH STENT PLACEMENT      PLACEMENT OF STENT 1    GALLBLADDER SURGERY      HIP SURGERY      TONSILLECTOMY         Current Outpatient Medications:     allopurinol (ZYLOPRIM) 100 mg tablet, Take 1 tablet by mouth 2 (two) times a day, Disp: , Rfl:     aspirin 81 MG tablet, Take 2 tablets by mouth, Disp: , Rfl:     atorvastatin (LIPITOR) 40 mg tablet, Take 40 mg by mouth daily  , Disp: , Rfl:     cyanocobalamin (VITAMIN B-12) 100 mcg tablet, Take by mouth daily  , Disp: , Rfl:     Epoetin Devon (PROCRIT IJ), Inject as directed as needed, Disp: , Rfl:     ergocalciferol (VITAMIN D2) 50,000 units, once a week , Disp: , Rfl:     famotidine (PEPCID) 20 mg tablet, as needed , Disp: , Rfl:     finasteride (PROSCAR) 5 mg tablet, Take 1 tablet (5 mg total) by mouth daily for 180 days, Disp: 90 tablet, Rfl: 1    furosemide (LASIX) 20 mg tablet, Take 20 mg by mouth as needed  , Disp: , Rfl:     KLOR-CON 10 10 MEQ tablet, Take 10 mEq by mouth as needed , Disp: , Rfl:     Magnesium Cl-Calcium Carbonate (SLOW-MAG PO), Take 72 mg by mouth daily, Disp: , Rfl:     Magnesium Oxide 400 MG CAPS, Take 8 capsules by mouth daily , Disp: , Rfl:     nitroglycerin (NITROLINGUAL) 0 4 mg/spray spray, Place 1 spray under the tongue every 5 (five) minutes as needed for chest pain, Disp: 12 g, Rfl: 5    nystatin (MYCOSTATIN) powder, Apply topically 3 (three) times a day for 30 days, Disp: 15 g, Rfl: 0    polyethylene glycol-propylene glycol (SYSTANE ULTRA) 0 4-0 3 %, Apply 1 drop to eye daily, Disp: , Rfl:   Allergies   Allergen Reactions    Carboplatin Facial Swelling and Rash     Category:  Allergy;        Labs:  Lab Requisition on 09/26/2019   Component Date Value    Iron Saturation 09/26/2019 25     TIBC 09/26/2019 195*    Iron 09/26/2019 48*   Appointment on 07/23/2019   Component Date Value    CEA 07/23/2019 2 1    Lab Requisition on 07/19/2019   Component Date Value    Sodium 07/19/2019 133*    Potassium 07/19/2019 4 0     Chloride 07/19/2019 102     CO2 07/19/2019 26     ANION GAP 07/19/2019 5     BUN 07/19/2019 19     Creatinine 07/19/2019 1 36*    Glucose 07/19/2019 157*    Calcium 07/19/2019 8 6     AST 07/19/2019 19     ALT 07/19/2019 20     Alkaline Phosphatase 07/19/2019 84     Total Protein 07/19/2019 7 8     Albumin 07/19/2019 2 8*    Total Bilirubin 07/19/2019 0 39     eGFR 07/19/2019 49     Magnesium 07/19/2019 1 6     LD 07/19/2019 157     Iron 07/19/2019 80     Iron Saturation 07/19/2019 35     TIBC 07/19/2019 231*    Iron 07/19/2019 80     TIBC 07/19/2019 231*     Imaging: No results found  Review of Systems:  Review of Systems   Constitutional: Negative for activity change and fatigue  HENT: Negative for nosebleeds  Eyes: Negative for visual disturbance  Respiratory: Positive for shortness of breath  Negative for apnea, wheezing and stridor  Cardiovascular: Positive for leg swelling  Negative for chest pain and palpitations  Endocrine: Negative for cold intolerance  Genitourinary: Negative for hematuria  Musculoskeletal: Negative for arthralgias, gait problem and myalgias  Skin: Positive for pallor  Negative for rash  Allergic/Immunologic: Positive for immunocompromised state  Neurological: Negative for dizziness and syncope  Hematological: Does not bruise/bleed easily  Psychiatric/Behavioral: Negative for sleep disturbance  The patient is not nervous/anxious  Physical Exam:  Physical Exam   Constitutional:  Non-toxic appearance  He does not appear ill  No distress  Neck: No JVD present  Cardiovascular:  No extrasystoles are present  Exam reveals no friction rub and no decreased pulses  Murmur (1/4 systolic ejection murmur at the base) heard  Pulmonary/Chest: Effort normal and breath sounds normal  No accessory muscle usage or stridor   No apnea, no tachypnea and no bradypnea  No respiratory distress  Musculoskeletal:        Right lower leg: He exhibits edema  Left lower leg: He exhibits edema  Neurological: He is alert  Skin: Skin is warm and dry  Capillary refill takes less than 2 seconds  Psychiatric: He has a normal mood and affect  Vitals reviewed  Discussion/Summary:  Coronary artery disease, status post large inferolateral MI over 15 years ago, status post thrombolytic therapy plus rescue PTCA stent  Significant LV dysfunction with improvement, an echocardiogram 2 years ago revealed inferior hypokinesis with stage I diastolic dysfunction and mild mitral insufficiency, stress test last year revealed a fixed inferior defect but no ischemia ejection fraction 55%  Concerned with recently elevated BNP will repeat an echocardiogram   Will repeat a lipid profile as well  This note was completed in part utilizing [employer] direct voice recognition software  Grammatical errors, random word insertion, spelling mistakes, and incomplete sentences may be an occasional consequence of the system secondary to software limitations, ambient noise and hardware issues  At the time of dictation, efforts were made to edit, clarify and /or correct errors  Please read the chart carefully and recognize, using context, where substitutions have occurred  If you have any questions or concerns about the context, text or information contained within the body of this dictation, please contact myself, the provider, for further clarification

## 2019-12-19 ENCOUNTER — HOSPITAL ENCOUNTER (OUTPATIENT)
Dept: NON INVASIVE DIAGNOSTICS | Facility: CLINIC | Age: 79
Discharge: HOME | End: 2019-12-19
Payer: MEDICARE

## 2019-12-19 DIAGNOSIS — I25.10 CORONARY ARTERY DISEASE INVOLVING NATIVE CORONARY ARTERY OF NATIVE HEART WITHOUT ANGINA PECTORIS: ICD-10-CM

## 2019-12-19 DIAGNOSIS — E78.00 HYPERCHOLESTEROLEMIA: ICD-10-CM

## 2019-12-19 PROCEDURE — 93306 TTE W/DOPPLER COMPLETE: CPT | Performed by: INTERNAL MEDICINE

## 2019-12-19 PROCEDURE — 93306 TTE W/DOPPLER COMPLETE: CPT

## 2020-01-08 ENCOUNTER — OFFICE VISIT (OUTPATIENT)
Dept: FAMILY MEDICINE CLINIC | Facility: CLINIC | Age: 80
End: 2020-01-08
Payer: MEDICARE

## 2020-01-08 VITALS
SYSTOLIC BLOOD PRESSURE: 114 MMHG | HEART RATE: 97 BPM | BODY MASS INDEX: 19.16 KG/M2 | TEMPERATURE: 97.8 F | WEIGHT: 129.4 LBS | RESPIRATION RATE: 15 BRPM | OXYGEN SATURATION: 98 % | HEIGHT: 69 IN | DIASTOLIC BLOOD PRESSURE: 60 MMHG

## 2020-01-08 DIAGNOSIS — R63.4 WEIGHT LOSS: Primary | ICD-10-CM

## 2020-01-08 DIAGNOSIS — C78.1 SECONDARY MALIGNANT NEOPLASM OF MEDIASTINUM (HCC): ICD-10-CM

## 2020-01-08 DIAGNOSIS — E44.0 MODERATE PROTEIN-CALORIE MALNUTRITION (HCC): ICD-10-CM

## 2020-01-08 DIAGNOSIS — C23 GALLBLADDER CARCINOMA (HCC): ICD-10-CM

## 2020-01-08 DIAGNOSIS — I50.9 HEART FAILURE, UNSPECIFIED HF CHRONICITY, UNSPECIFIED HEART FAILURE TYPE (HCC): ICD-10-CM

## 2020-01-08 PROCEDURE — 1123F ACP DISCUSS/DSCN MKR DOCD: CPT | Performed by: FAMILY MEDICINE

## 2020-01-08 PROCEDURE — G0439 PPPS, SUBSEQ VISIT: HCPCS | Performed by: FAMILY MEDICINE

## 2020-01-08 PROCEDURE — 99215 OFFICE O/P EST HI 40 MIN: CPT | Performed by: FAMILY MEDICINE

## 2020-01-08 NOTE — PROGRESS NOTES
Chief Complaint   Patient presents with    Weight Loss    Anorexia     has not had much of an appetite in the last 3 months    Fatigue       Assessment/Plan:  Labs  Discussed needed nutrition and hydration  Oncology evaluation  Diagnoses and all orders for this visit:    Weight loss  -     CBC and Platelet; Future  -     Comprehensive metabolic panel; Future  -     TSH, 3rd generation; Future    Secondary malignant neoplasm of mediastinum (HCC)    Moderate protein-calorie malnutrition (HCC)    Heart failure, unspecified HF chronicity, unspecified heart failure type ([facility] [address] )    Gallbladder carcinoma (HCC)          Subjective:      Patient ID: Marla Lorenzo is a [de-identified] y o  male  Follow up, weight loss, lack of appetite past several months, accompanied by feeling tired, weakness  Scheduled to see Oncology this month  Presently not taking any med's  Reviewed PMH, surgical history  The following portions of the patient's history were reviewed and updated as appropriate: allergies, current medications, past medical history, past social history, past surgical history and problem list   I have spent 40 minutes with Patient  today in which greater than 50% of this time was spent in counseling/coordination of care regarding Diagnostic results, Intructions for management and Risk factor reductions  Review of Systems   Constitutional: Positive for activity change, appetite change and fatigue  HENT: Negative  Eyes: Negative  Respiratory: Negative for shortness of breath  Cardiovascular: Negative  Gastrointestinal: Negative  Genitourinary: Negative  Musculoskeletal: Negative  Skin: Negative  Neurological: Negative  Psychiatric/Behavioral: Negative            Objective:      /60 (BP Location: Left arm, Patient Position: Sitting, Cuff Size: Standard)   Pulse 97   Temp 97 8 °F (36 6 °C) (Oral)   Resp 15   Ht 5' 9" (1 753 m)   Wt 58 7 kg (129 lb 6 4 oz)   SpO2 98%   BMI 19 11 kg/m²       Current Outpatient Medications:     allopurinol (ZYLOPRIM) 100 mg tablet, Take 1 tablet by mouth 2 (two) times a day, Disp: , Rfl:     aspirin 81 MG tablet, Take 2 tablets by mouth, Disp: , Rfl:     atorvastatin (LIPITOR) 40 mg tablet, Take 40 mg by mouth daily  , Disp: , Rfl:     cyanocobalamin (VITAMIN B-12) 100 mcg tablet, Take by mouth daily  , Disp: , Rfl:     Epoetin Devon (PROCRIT IJ), Inject as directed as needed, Disp: , Rfl:     ergocalciferol (VITAMIN D2) 50,000 units, once a week , Disp: , Rfl:     famotidine (PEPCID) 20 mg tablet, as needed , Disp: , Rfl:     furosemide (LASIX) 20 mg tablet, Take 20 mg by mouth as needed  , Disp: , Rfl:     KLOR-CON 10 10 MEQ tablet, Take 10 mEq by mouth as needed , Disp: , Rfl:     Magnesium Cl-Calcium Carbonate (SLOW-MAG PO), Take 72 mg by mouth daily, Disp: , Rfl:     Magnesium Oxide 400 MG CAPS, Take 8 capsules by mouth daily , Disp: , Rfl:     nitroglycerin (NITROLINGUAL) 0 4 mg/spray spray, Place 1 spray under the tongue every 5 (five) minutes as needed for chest pain, Disp: 12 g, Rfl: 5    finasteride (PROSCAR) 5 mg tablet, Take 1 tablet (5 mg total) by mouth daily for 180 days, Disp: 90 tablet, Rfl: 1    nystatin (MYCOSTATIN) powder, Apply topically 3 (three) times a day for 30 days, Disp: 15 g, Rfl: 0    polyethylene glycol-propylene glycol (SYSTANE ULTRA) 0 4-0 3 %, Apply 1 drop to eye daily, Disp: , Rfl:      Physical Exam   Constitutional: He is oriented to person, place, and time  HENT:   Head: Normocephalic and atraumatic  Right Ear: External ear normal    Left Ear: External ear normal    Nose: Nose normal    Mouth/Throat: Oropharynx is clear and moist    Eyes: Pupils are equal, round, and reactive to light  Conjunctivae and EOM are normal    Neck: Normal range of motion  Neck supple  Cardiovascular: Normal rate, regular rhythm, normal heart sounds and intact distal pulses     Pulmonary/Chest: Effort normal and breath sounds normal    Abdominal: Soft  Bowel sounds are normal    Neurological: He is alert and oriented to person, place, and time  He has normal reflexes  Skin: Skin is warm and dry  Psychiatric: He has a normal mood and affect   His behavior is normal  Judgment and thought content normal

## 2020-01-08 NOTE — PROGRESS NOTES
Assessment and Plan:     Problem List Items Addressed This Visit     None           Preventive health issues were discussed with patient, and age appropriate screening tests were ordered as noted in patient's After Visit Summary  Personalized health advice and appropriate referrals for health education or preventive services given if needed, as noted in patient's After Visit Summary  History of Present Illness:     Patient presents for Welcome to Medicare visit  Patient Care Team:  Marcela Gonzalez DO as PCP - Victor Manuel Gordillo MD (Radiation Oncology)  Ritu Pinto MD (Cardiology)  Cindy Martin MD (Gastroenterology)  Marcela Gonzalez, Erline Kussmaul, MD (Surgical Oncology)  Hema Posey MD (Hematology and Oncology)  Shamika Zhao MD (Nephrology)     Review of Systems:     Review of Systems   Constitutional: Positive for activity change and appetite change  HENT: Negative  Eyes: Negative  Respiratory: Negative  Cardiovascular: Negative  Gastrointestinal: Negative  Negative for abdominal pain  Genitourinary: Negative  Musculoskeletal: Negative  Skin: Negative  Neurological: Negative  Psychiatric/Behavioral: Negative         Problem List:     Patient Active Problem List   Diagnosis    Personal history of malignant neoplasm of esophagus    Personal history of malignant neoplasm of gallbladder    Type 2 diabetes mellitus ([facility] [address] [facility]    Hypertension, essential, benign    Coronary artery disease involving native coronary artery of native heart without angina pectoris    Hypercholesterolemia    Encounter for follow-up examination after completed treatment for malignant neoplasm    History of chemotherapy      Past Medical and Surgical History:     Past Medical History:   Diagnosis Date    Abnormal EKG     Acute myocardial infarction ([facility] [address] [facility]     Dry skin     LAST ASSESSED: [id number]    History of chemotherapy     History of external beam radiation therapy     Hypotension     Hypoxia     Liver cancer ([facility] )     MDS (myelodysplastic syndrome) ([place] [address] ) 2019    Puncture wound of thumb     LAST ASSESSED: [id number]    Superficial injury of foot     LAST ASSESSED: [id number]    Tachycardia     Type 2 diabetes mellitus ([facility] )     LAST ASSESSED: [id number]    Vitamin D deficiency     LAST ASSESSED: [id number]     Past Surgical History:   Procedure Laterality Date    ANGIOPLASTY      PREVIOUS LASER ANGIOPLASTY WITH STENT PLACEMENT    BONE MARROW BIOPSY  2019    CATARACT EXTRACTION W/  INTRAOCULAR LENS IMPLANT Bilateral     CHOLECYSTECTOMY      CORONARY ANGIOPLASTY WITH STENT PLACEMENT      PLACEMENT OF STENT 1    GALLBLADDER SURGERY      HIP SURGERY      TONSILLECTOMY        Family History:     Family History   Problem Relation Age of Onset    Leukemia Mother     Lymphoma Son     Lymphoma Son     No Known Problems Father       Social History:     Social History     Socioeconomic History    Marital status:       Spouse name: Eunice Fitzpatrick Number of children: 3    Years of education: None    Highest education level: None   Occupational History    Occupation: Retired   Social Needs    Financial resource strain: None    Food insecurity:     Worry: None     Inability: None    Transportation needs:     Medical: None     Non-medical: None   Tobacco Use    Smoking status: Former Smoker     Last attempt to quit: 2/15/1975     Years since quittin 9    Smokeless tobacco: Never Used    Tobacco comment: FORMER SMOKER - SMOKED 2 PPD FOR 20 YRS AND QUIT 2/15/1975 AS PER [employer]   Substance and Sexual Activity    Alcohol use: No     Comment: (HISTORY) RARELY AS PER [employer]    Drug use: No    Sexual activity: None   Lifestyle    Physical activity:     Days per week: None     Minutes per session: None    Stress: None   Relationships    Social connections:     Talks on phone: None     Gets together: None     Attends [employer] service: None     Active member of club or organization: None     Attends meetings of clubs or organizations: None     Relationship status: None    Intimate partner violence:     Fear of current or ex partner: None     Emotionally abused: None     Physically abused: None     Forced sexual activity: None   Other Topics Concern    None   Social History Narrative    None      Medications and Allergies:     Current Outpatient Medications   Medication Sig Dispense Refill    allopurinol (ZYLOPRIM) 100 mg tablet Take 1 tablet by mouth 2 (two) times a day      aspirin 81 MG tablet Take 2 tablets by mouth      atorvastatin (LIPITOR) 40 mg tablet Take 40 mg by mouth daily        cyanocobalamin (VITAMIN B-12) 100 mcg tablet Take by mouth daily        Epoetin Devon (PROCRIT IJ) Inject as directed as needed      ergocalciferol (VITAMIN D2) 50,000 units once a week       famotidine (PEPCID) 20 mg tablet as needed       furosemide (LASIX) 20 mg tablet Take 20 mg by mouth as needed        KLOR-CON 10 10 MEQ tablet Take 10 mEq by mouth as needed       Magnesium Cl-Calcium Carbonate (SLOW-MAG PO) Take 72 mg by mouth daily      Magnesium Oxide 400 MG CAPS Take 8 capsules by mouth daily       nitroglycerin (NITROLINGUAL) 0 4 mg/spray spray Place 1 spray under the tongue every 5 (five) minutes as needed for chest pain 12 g 5    finasteride (PROSCAR) 5 mg tablet Take 1 tablet (5 mg total) by mouth daily for 180 days 90 tablet 1    nystatin (MYCOSTATIN) powder Apply topically 3 (three) times a day for 30 days 15 g 0    polyethylene glycol-propylene glycol (SYSTANE ULTRA) 0 4-0 3 % Apply 1 drop to eye daily       No current facility-administered medications for this visit  Allergies   Allergen Reactions    Carboplatin Facial Swelling and Rash     Category:  Allergy;       Immunizations:     Immunization History   Administered Date(s) Administered    Influenza Quadrivalent Preservative Free 3 years and older IM 10/10/2014    Influenza Quadrivalent, 6-35 Months IM 09/02/2016    Pneumococcal Conjugate 13-Valent 06/30/2015    Pneumococcal Polysaccharide PPV23 11/18/2014    TD (adult) Preservative Free 03/08/2013      Health Maintenance:         Topic Date Due    CRC Screening: Colonoscopy  1940         Topic Date Due    DTaP,Tdap,and Td Vaccines (1 - Tdap) 01/15/1950    Hepatitis B Vaccine (1 of 3 - Risk 3-dose series) 01/15/1958    Influenza Vaccine  07/01/2019      Medicare Screening Tests and Risk Assessments:     Yanni Vazquez is here for his Subsequent Wellness visit  Health Risk Assessment:   Patient rates overall health as poor  Patient feels that their physical health rating is much worse  Eyesight was rated as same  Hearing was rated as same  Patient feels that their emotional and mental health rating is slightly worse  Pain experienced in the last 7 days has been none  Patient states that he has experienced weight loss or gain in last 6 months  Depression Screening:   PHQ-2 Score: 2      Fall Risk Screening: In the past year, patient has experienced: history of falling in past year    Number of falls: 2 or more  Injured during fall?: No    Feels unsteady when standing or walking?: Yes    Worried about falling?: Yes      Home Safety:  Patient has trouble with stairs inside or outside of their home  Patient has working smoke alarms and has working carbon monoxide detector  Home safety hazards include: loose rugs on the floor  Nutrition:   Current diet is Unhealthy  Medications:   Patient is currently taking over-the-counter supplements  OTC medications include: see medication list  Patient is able to manage medications  Activities of Daily Living (ADLs)/Instrumental Activities of Daily Living (IADLs):   Walk and transfer into and out of bed and chair?: Yes  Dress and groom yourself?: Yes    Bathe or shower yourself?: Yes    Feed yourself?  Yes  Do your laundry/housekeeping?: Yes  Manage your money, pay your bills and track your expenses?: Yes  Make your own meals?: Yes    Do your own shopping?: Yes    Previous Hospitalizations:   Any hospitalizations or ED visits within the last 12 months?: No      Advance Care Planning:   Living will: Yes    Durable POA for healthcare: Yes    Advanced directive: Yes      PREVENTIVE SCREENINGS      Cardiovascular Screening:    General: Screening Not Indicated and History Lipid Disorder      Diabetes Screening:     General: Screening Not Indicated and History Diabetes      Colorectal Cancer Screening:     General: Screening Current      Prostate Cancer Screening:    General: Screening Not Indicated      Osteoporosis Screening:    General: Screening Not Indicated      Abdominal Aortic Aneurysm (AAA) Screening:    Risk factors include: tobacco use        General: Screening Not Indicated      Lung Cancer Screening:     General: Screening Not Indicated      Hepatitis C Screening:    General: Screening Not Indicated    No exam data present     Physical Exam:     /60 (BP Location: Left arm, Patient Position: Sitting, Cuff Size: Standard)   Pulse 97   Temp 97 8 °F (36 6 °C) (Oral)   Resp 15   Ht 5' 9" (1 753 m)   Wt 58 7 kg (129 lb 6 4 oz)   SpO2 98%   BMI 19 11 kg/m²     Physical Exam   Constitutional: He is oriented to person, place, and time  Thin, frail looking   HENT:   Head: Normocephalic and atraumatic  Right Ear: External ear normal    Left Ear: External ear normal    Nose: Nose normal    Mouth/Throat: Oropharynx is clear and moist    Eyes: Pupils are equal, round, and reactive to light  Conjunctivae and EOM are normal    Neck: Normal range of motion  Neck supple  Cardiovascular: Normal rate, regular rhythm, normal heart sounds and intact distal pulses  Pulmonary/Chest: Effort normal and breath sounds normal    Abdominal: Soft  Bowel sounds are normal    Neurological: He is alert and oriented to person, place, and time  He has normal reflexes  Skin: Skin is warm and dry  Psychiatric: He has a normal mood and affect   His behavior is normal  Judgment and thought content normal        eRnettae Pages, DO

## 2020-01-09 ENCOUNTER — APPOINTMENT (OUTPATIENT)
Dept: LAB | Facility: MEDICAL CENTER | Age: 80
End: 2020-01-09
Payer: MEDICARE

## 2020-01-09 DIAGNOSIS — Z85.01 PERSONAL HISTORY OF MALIGNANT NEOPLASM OF ESOPHAGUS: ICD-10-CM

## 2020-01-09 DIAGNOSIS — I25.10 CORONARY ARTERY DISEASE INVOLVING NATIVE CORONARY ARTERY OF NATIVE HEART WITHOUT ANGINA PECTORIS: ICD-10-CM

## 2020-01-09 DIAGNOSIS — R63.4 WEIGHT LOSS: ICD-10-CM

## 2020-01-09 DIAGNOSIS — E78.00 HYPERCHOLESTEROLEMIA: ICD-10-CM

## 2020-01-09 LAB
ALBUMIN SERPL BCP-MCNC: 2.6 G/DL (ref 3.5–5)
ALP SERPL-CCNC: 67 U/L (ref 46–116)
ALT SERPL W P-5'-P-CCNC: 15 U/L (ref 12–78)
ANION GAP SERPL CALCULATED.3IONS-SCNC: 4 MMOL/L (ref 4–13)
AST SERPL W P-5'-P-CCNC: 19 U/L (ref 5–45)
BILIRUB SERPL-MCNC: 0.4 MG/DL (ref 0.2–1)
BUN SERPL-MCNC: 18 MG/DL (ref 5–25)
CALCIUM SERPL-MCNC: 9.3 MG/DL (ref 8.3–10.1)
CEA SERPL-MCNC: 1.2 NG/ML (ref 0–3)
CHLORIDE SERPL-SCNC: 106 MMOL/L (ref 100–108)
CHOLEST SERPL-MCNC: 134 MG/DL (ref 50–200)
CO2 SERPL-SCNC: 30 MMOL/L (ref 21–32)
CREAT SERPL-MCNC: 1.34 MG/DL (ref 0.6–1.3)
ERYTHROCYTE [DISTWIDTH] IN BLOOD BY AUTOMATED COUNT: 15.3 % (ref 11.6–15.1)
GFR SERPL CREATININE-BSD FRML MDRD: 50 ML/MIN/1.73SQ M
GLUCOSE P FAST SERPL-MCNC: 99 MG/DL (ref 65–99)
HCT VFR BLD AUTO: 40.8 % (ref 36.5–49.3)
HDLC SERPL-MCNC: 51 MG/DL
HGB BLD-MCNC: 12.6 G/DL (ref 12–17)
LDLC SERPL CALC-MCNC: 71 MG/DL (ref 0–100)
MCH RBC QN AUTO: 28.6 PG (ref 26.8–34.3)
MCHC RBC AUTO-ENTMCNC: 30.9 G/DL (ref 31.4–37.4)
MCV RBC AUTO: 93 FL (ref 82–98)
PLATELET # BLD AUTO: 163 THOUSANDS/UL (ref 149–390)
PMV BLD AUTO: 10.9 FL (ref 8.9–12.7)
POTASSIUM SERPL-SCNC: 4.2 MMOL/L (ref 3.5–5.3)
PROT SERPL-MCNC: 8.9 G/DL (ref 6.4–8.2)
RBC # BLD AUTO: 4.41 MILLION/UL (ref 3.88–5.62)
SODIUM SERPL-SCNC: 140 MMOL/L (ref 136–145)
TRIGL SERPL-MCNC: 58 MG/DL
TSH SERPL DL<=0.05 MIU/L-ACNC: 1.95 UIU/ML (ref 0.36–3.74)
WBC # BLD AUTO: 6.67 THOUSAND/UL (ref 4.31–10.16)

## 2020-01-09 PROCEDURE — 80053 COMPREHEN METABOLIC PANEL: CPT

## 2020-01-09 PROCEDURE — 36415 COLL VENOUS BLD VENIPUNCTURE: CPT

## 2020-01-09 PROCEDURE — 80061 LIPID PANEL: CPT

## 2020-01-09 PROCEDURE — 82378 CARCINOEMBRYONIC ANTIGEN: CPT

## 2020-01-09 PROCEDURE — 84443 ASSAY THYROID STIM HORMONE: CPT

## 2020-01-09 PROCEDURE — 85027 COMPLETE CBC AUTOMATED: CPT

## 2020-01-22 ENCOUNTER — TELEPHONE (OUTPATIENT)
Dept: SURGICAL ONCOLOGY | Facility: CLINIC | Age: 80
End: 2020-01-22

## 2020-01-23 ENCOUNTER — HOSPITAL ENCOUNTER (OUTPATIENT)
Dept: RADIOLOGY | Age: 80
Discharge: HOME | End: 2020-01-23
Payer: MEDICARE

## 2020-01-23 DIAGNOSIS — Z85.01 PERSONAL HISTORY OF MALIGNANT NEOPLASM OF ESOPHAGUS: ICD-10-CM

## 2020-01-23 PROCEDURE — 71260 CT THORAX DX C+: CPT

## 2020-01-23 PROCEDURE — 74177 CT ABD & PELVIS W/CONTRAST: CPT

## 2020-01-23 RX ADMIN — IOHEXOL 100 ML: 350 INJECTION, SOLUTION INTRAVENOUS at 08:42

## 2020-02-05 ENCOUNTER — OFFICE VISIT (OUTPATIENT)
Dept: SURGICAL ONCOLOGY | Facility: CLINIC | Age: 80
End: 2020-02-05
Payer: MEDICARE

## 2020-02-05 VITALS
HEART RATE: 89 BPM | BODY MASS INDEX: 19.55 KG/M2 | WEIGHT: 132 LBS | HEIGHT: 69 IN | SYSTOLIC BLOOD PRESSURE: 140 MMHG | DIASTOLIC BLOOD PRESSURE: 80 MMHG | TEMPERATURE: 97.9 F | RESPIRATION RATE: 16 BRPM

## 2020-02-05 DIAGNOSIS — Z08 ENCOUNTER FOR FOLLOW-UP EXAMINATION AFTER COMPLETED TREATMENT FOR MALIGNANT NEOPLASM: Primary | ICD-10-CM

## 2020-02-05 DIAGNOSIS — Z85.01 PERSONAL HISTORY OF MALIGNANT NEOPLASM OF ESOPHAGUS: ICD-10-CM

## 2020-02-05 DIAGNOSIS — Z85.09 PERSONAL HISTORY OF MALIGNANT NEOPLASM OF GALLBLADDER: ICD-10-CM

## 2020-02-05 PROCEDURE — 3077F SYST BP >= 140 MM HG: CPT | Performed by: SURGERY

## 2020-02-05 PROCEDURE — 3079F DIAST BP 80-89 MM HG: CPT | Performed by: SURGERY

## 2020-02-05 PROCEDURE — 1160F RVW MEDS BY RX/DR IN RCRD: CPT | Performed by: SURGERY

## 2020-02-05 PROCEDURE — 3008F BODY MASS INDEX DOCD: CPT | Performed by: SURGERY

## 2020-02-05 PROCEDURE — 99214 OFFICE O/P EST MOD 30 MIN: CPT | Performed by: SURGERY

## 2020-02-05 PROCEDURE — 4040F PNEUMOC VAC/ADMIN/RCVD: CPT | Performed by: SURGERY

## 2020-02-05 PROCEDURE — 1036F TOBACCO NON-USER: CPT | Performed by: SURGERY

## 2020-02-05 PROCEDURE — 1170F FXNL STATUS ASSESSED: CPT | Performed by: SURGERY

## 2020-02-05 NOTE — PROGRESS NOTES
Surgical Oncology Follow Up       [address] [facility]  CANCER [facility] SURGICAL ONCOLOGY [place]  [address]  [place] [address]    [place] [address]  1940  [id number]  [address] [facility] SURGICAL ONCOLOGY [place]  [address]   [facility] [place] [address]    Diagnoses and all orders for this visit:    Encounter for follow-up examination after completed treatment for malignant neoplasm    Personal history of malignant neoplasm of esophagus    Personal history of malignant neoplasm of gallbladder        Chief Complaint   Patient presents with    Follow-up     Pt is here for 6 month f/u       No follow-ups on file  Personal history of malignant neoplasm of esophagus    11/11/2014 Initial Diagnosis     Carcinoma of esophagus ([address] )      11/11/2014 Biopsy     GE junction biopsy and distal esophageal biopsy - 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  The tumor is histologically dissimilar to the patient's previously diagnosed adenocarcinoma of the gallbladder      11/24/2014 - 11/26/2014 Chemotherapy     Etoposide with carboplatin      12/17/2014 - 1/20/2015 Radiation     Distal esophagus to 4140 cGy      12/17/2014 - 1/20/2015 Chemotherapy     Concurrent weekly Taxol and carboplatin  1/13/15 had reaction to carboplatin and it was stopped    Taxol only aftyer that      3/4/2015 Surgery     Esophagectomy by Dr Guanakito Duffy      6/25/2015 - 7/29/2015 Chemotherapy     Temodar        Personal history of malignant neoplasm of gallbladder    5/17/2005 Initial Diagnosis     Malignant neoplasm of gallbladder [facility])      7/2005 Surgery     Cholecystectomy followed by RFA      8/1/2005 - 9/2/2005 Radiation     Right abdomen/gallbladder area to 4500 cGy      8/1/2005 - 9/6/2005 Chemotherapy     Continuous infusion 5FU with radiation      4/2007 Progression     Recurrent disease      5/30/2007 - 1/30/2008 Chemotherapy Gemcitabine/cisplatin/carboplatin      2/21/2008 Surgery     Resection of liver lesions and hannah hepatic mass      3/26/2008 - 8/20/2008 Chemotherapy     Gemcitabine/carboplatin         Diagnosis and Staging: Gallbladder cancer: T2N0M0   initially treated 2005  Recurrence treated February 2008  [id number] 30cm and 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  [id number] esophageal cancer March 2015   Treatment History: Radical cholecystectomy with liver resection 2005  Recurrence treated with resection of segment 5 lesion in 2008  Carboplatin and etoposide November 2014 for his esophageal carcinoma  Radiation completed January 20, 2015  Transhiatal esophagectomy, March 2015  Xeloda/Temodar   Current Therapy: Observation   Disease Status: MILTON     History of Present Illness:  Patient returns in follow-up  His only complaint is that he has lost approximately 15 lb over the last 6 months  He has changed his diet somewhat  No nausea or vomiting  CT from January 23, 2020 reveals no evidence of recurrence  I personally reviewed the films  Review of Systems  Complete ROS Surg Onc:   Complete ROS Surg Onc:   Constitutional: The patient has weight loss  No fatigue  Eyes: No complaints of visual problems, no scleral icterus  ENT: no complaints of ear pain, no hoarseness, no difficulty swallowing,  no tinnitus and no new masses in head, oral cavity, or neck  Cardiovascular: No complaints of chest pain, no palpitations, no ankle edema  Respiratory: No complaints of shortness of breath, no cough  Gastrointestinal: No complaints of jaundice, no bloody stools, no pale stools  Genitourinary: No complaints of dysuria, no hematuria, no nocturia, no frequent urination, no urethral discharge  Musculoskeletal: No complaints of weakness, paralysis, joint stiffness or arthralgias  Integumentary: No complaints of rash, no new lesions  Neurological: No complaints of convulsions, no seizures, no dizziness  Hematologic/Lymphatic: No complaints of easy bruising  Endocrine:  No hot or cold intolerance  No polydipsia, polyphagia, or polyuria  Allergy/immunology:  No environmental allergies  No food allergies  Not immunocompromised  Skin:  No pallor or rash  No wound          Patient Active Problem List   Diagnosis    Personal history of malignant neoplasm of esophagus    Personal history of malignant neoplasm of gallbladder    Type 2 diabetes mellitus ([facility] )    Hypertension, essential, benign    Coronary artery disease involving native coronary artery of native heart without angina pectoris    Hypercholesterolemia    Encounter for follow-up examination after completed treatment for malignant neoplasm    History of chemotherapy    Secondary malignant neoplasm of mediastinum ([place] [address] )    Moderate protein-calorie malnutrition ([place] [address] )    Heart failure ([place] [address] )     Past Medical History:   Diagnosis Date    Abnormal EKG     Acute myocardial infarction ([place] [address] )     Dry skin     LAST ASSESSED: [id number]    History of chemotherapy     History of external beam radiation therapy     Hypotension     Hypoxia     Liver cancer (HCC)     MDS (myelodysplastic syndrome) ([place] [address] ) 2019    Puncture wound of thumb     LAST ASSESSED: [id number]    Superficial injury of foot     LAST ASSESSED: [id number]    Tachycardia     Type 2 diabetes mellitus (HCC)     LAST ASSESSED: [id number]    Vitamin D deficiency     LAST ASSESSED: [id number]     Past Surgical History:   Procedure Laterality Date    ANGIOPLASTY      PREVIOUS LASER ANGIOPLASTY WITH STENT PLACEMENT    BONE MARROW BIOPSY  2019    CATARACT EXTRACTION W/  INTRAOCULAR LENS IMPLANT Bilateral     CHOLECYSTECTOMY      CORONARY ANGIOPLASTY WITH STENT PLACEMENT      PLACEMENT OF STENT 1    GALLBLADDER SURGERY      HIP SURGERY      TONSILLECTOMY       Family History   Problem Relation Age of Onset    Leukemia Mother     Lymphoma Son     Lymphoma Son     No Known Problems Father      Social History     Socioeconomic History    Marital status:       Spouse name: Mary Shafer Number of children: 3    Years of education: Not on file    Highest education level: Not on file   Occupational History    Occupation: Retired   Social Needs    Financial resource strain: Not on file    Food insecurity:     Worry: Not on file     Inability: Not on file   [place] needs:     Medical: Not on file     Non-medical: Not on file   Tobacco Use    Smoking status: Former Smoker     Last attempt to quit: 2/15/1975     Years since quittin 0    Smokeless tobacco: Never Used    Tobacco comment: FORMER SMOKER - SMOKED 2 PPD FOR 20 YRS AND QUIT 2/15/1975 AS PER [employer]   Substance and Sexual Activity    Alcohol use: No     Comment: (HISTORY) RARELY AS PER [employer]    Drug use: No    Sexual activity: Not on file   Lifestyle    Physical activity:     Days per week: Not on file     Minutes per session: Not on file    Stress: Not on file   Relationships    Social connections:     Talks on phone: Not on file     Gets together: Not on file     Attends Sabianist service: Not on file     Active member of club or organization: Not on file     Attends meetings of clubs or organizations: Not on file     Relationship status: Not on file    Intimate partner violence:     Fear of current or ex partner: Not on file     Emotionally abused: Not on file     Physically abused: Not on file     Forced sexual activity: Not on file   Other Topics Concern    Not on file   Social History Narrative    Not on file       Current Outpatient Medications:     allopurinol (ZYLOPRIM) 100 mg tablet, Take 1 tablet by mouth 2 (two) times a day, Disp: , Rfl:     aspirin 81 MG tablet, Take 2 tablets by mouth, Disp: , Rfl:     atorvastatin (LIPITOR) 40 mg tablet, Take 40 mg by mouth daily  , Disp: , Rfl:     cyanocobalamin (VITAMIN B-12) 100 mcg tablet, Take by mouth daily  , Disp: , Rfl:     Epoetin Devon (PROCRIT IJ), Inject as directed as needed, Disp: , Rfl:     ergocalciferol (VITAMIN D2) 50,000 units, once a week , Disp: , Rfl:     famotidine (PEPCID) 20 mg tablet, as needed , Disp: , Rfl:     finasteride (PROSCAR) 5 mg tablet, Take 1 tablet (5 mg total) by mouth daily for 180 days, Disp: 90 tablet, Rfl: 1    furosemide (LASIX) 20 mg tablet, Take 20 mg by mouth as needed  , Disp: , Rfl:     KLOR-CON 10 10 MEQ tablet, Take 10 mEq by mouth as needed , Disp: , Rfl:     Magnesium Cl-Calcium Carbonate (SLOW-MAG PO), Take 72 mg by mouth daily, Disp: , Rfl:     Magnesium Oxide 400 MG CAPS, Take 8 capsules by mouth daily , Disp: , Rfl:     nitroglycerin (NITROLINGUAL) 0 4 mg/spray spray, Place 1 spray under the tongue every 5 (five) minutes as needed for chest pain (Patient not taking: Reported on 2/5/2020), Disp: 12 g, Rfl: 5    nystatin (MYCOSTATIN) powder, Apply topically 3 (three) times a day for 30 days, Disp: 15 g, Rfl: 0    polyethylene glycol-propylene glycol (SYSTANE ULTRA) 0 4-0 3 %, Apply 1 drop to eye daily, Disp: , Rfl:   Allergies   Allergen Reactions    Carboplatin Facial Swelling and Rash     Category: Allergy;      Vitals:    02/05/20 1513   BP: 140/80   Pulse: 89   Resp: 16   Temp: 97 9 °F (36 6 °C)       Physical Exam  Constitutional: General appearance: The Patient is well-developed and well-nourished who appears the stated age in no acute distress  Patient is pleasant and talkative  HEENT:  Normocephalic  Sclerae are anicteric  Mucous membranes are moist  Neck is supple without adenopathy  No JVD  Chest: The lungs are clear to auscultation  Cardiac: Heart is regular rate  Abdomen: Abdomen is soft, non-tender, non-distended and without masses  Extremities: There is no clubbing or cyanosis  There is no edema  Symmetric  Neuro: Grossly nonfocal  Gait is normal      Lymphatic: No evidence of cervical adenopathy bilaterally     No evidence of axillary adenopathy bilaterally  No evidence of inguinal adenopathy bilaterally  Skin: Warm, anicteric  Psych:  Patient is pleasant and talkative  Breasts:        Pathology:  [unfilled]    Labs:      Imaging  Ct Chest Abdomen Pelvis W Contrast    Result Date: 1/27/2020  Narrative: CT CHEST, ABDOMEN AND PELVIS WITH IV CONTRAST INDICATION:   Z85 [id number]: Personal history of malignant neoplasm of esophagus  COMPARISON:  CTA abdomen pelvis 4/9/2019, CT chest abdomen pelvis 7/10/2018 TECHNIQUE: CT examination of the chest, abdomen and pelvis was performed  Axial, sagittal, and coronal 2D reformatted images were created from the source data and submitted for interpretation  Radiation dose length product (DLP) for this visit:  434 mGy-cm   This examination, like all CT scans performed in the [facility], was performed utilizing techniques to minimize radiation dose exposure, including the use of iterative reconstruction and automated exposure control  IV Contrast:  100 mL of iohexol (OMNIPAQUE) Enteric Contrast: Enteric contrast was administered  FINDINGS: CHEST LUNGS:  Persistent subpleural reticular opacities and scarring  This is most prominent at the bilateral lung bases and bilateral lung apices  No tracheal or endobronchial lesion  Left lower lobe calcified granuloma redemonstrated  PLEURA:  There are scattered calcified pleural plaques  HEART/GREAT VESSELS:  Unremarkable for patient's age  MEDIASTINUM AND NOLA:  There are scattered subcentimeter mediastinal hilar lymph nodes  Patient is status post gastric pull-through with contrast and debris present within the gastric pull-through  Again noted is mild stenosis at the anastomosis without sniffing change in the interval  CHEST WALL AND LOWER NECK:   There is a left chest wall port with catheter tip in the SVC  ABDOMEN LIVER/BILIARY TREE:  Persistent prominence of the intra and extrahepatic biliary ducts in this patient status post cholecystectomy  GALLBLADDER:  Gallbladder is surgically absent  SPLEEN:  Unremarkable  PANCREAS:  Pancreas is atrophic  ADRENAL GLANDS:  Unremarkable  KIDNEYS/URETERS:  One or more sharply circumscribed subcentimeter renal hypodensities are noted  These lesions are too small to accurately characterize, but are statistically most likely to represent benign cortical renal cyst(s)  According to the guidelines published in the [facility] Paper of the ACR Incidental Findings Committee (Radiology 2010), no further workup of these lesions is recommended  There is bilateral renal cortical scarring  2 mm nonobstructing left upper pole renal calculus  STOMACH AND BOWEL:  Status post gastric pull-through procedure  No evidence of bowel obstruction  Colonic diverticulosis without evidence of acute diverticulitis  APPENDIX:  No findings to suggest appendicitis  ABDOMINOPELVIC CAVITY:  No ascites or free intraperitoneal air  No lymphadenopathy  VESSELS:  Atherosclerotic changes are present  No evidence of aneurysm  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  2 PELVIS Evaluation of the pelvis is limited secondary to streak artifact from left hip arthroplasty  REPRODUCTIVE ORGANS:  Limited evaluation secondary streak artifact from left hip arthroplasty  URINARY BLADDER:  The bladder is decompressed which limits evaluation  Evaluation is limited by streak artifact from left hip arthroplasty  ABDOMINAL WALL/INGUINAL REGIONS:  Unremarkable  OSSEOUS STRUCTURES:  No acute fracture or destructive osseous lesion  Impression: 1  Stable postsurgical changes status post esophagectomy with gastric pull-through  2   Subpleural reticular opacities suggesting chronic interstitial change  No evidence of metastatic disease in the chest abdomen or pelvis  Workstation performed: [id number]     I reviewed the above laboratory and imaging data      Discussion/Summary: 62-year-old male with a remote history of gallbladder carcinoma and now with a [id number] esophageal carcinoma  The initial pathology revealed a neuroendocrine carcinoma, but the surgical pathology on the resection revealed adenocarcinoma with neuroendocrine features  At this time he is doing relatively well  He is clinically MILTON from his esophageal cancer at nearly 5 years  He has stopped all his medication  I did discuss with him that his weight loss and change in appetite is very similar to as it was after surgery  At that time he did struggle to maintain his weight  I counseled him on using nutritional supplements as well as ice cream, milk shakes, [place] instant breakfast   He is also using coconut oil to cook  I discussed that if he has continued weight loss to contact us as he may then require a feeding tube  He continues to follow-up with Medical Oncology regularly  I will see him again if he continues to lose weight and would require a feeding tube  He is agreeable to this   All his questions were answered

## 2020-02-05 NOTE — LETTER
February 5, 2020     Heath AllredDO  [address] [facility]  [place] [address] [address]    Patient: Elif Campbell   YOB: 1940   Date of Visit: 2/5/2020       Dear Dr Abiel Moise: Thank you for referring Marquez Alas to me for evaluation  Below are my notes for this consultation  If you have questions, please do not hesitate to call me  I look forward to following your patient along with you  Sincerely,        Nam Brock MD        CC: MD Nam Perez MD  2/5/2020  4:03 PM  Sign at close encounter               Surgical Oncology Follow Up       [address] [facility]  [address]  1940  [id number]  0 [facility]  [facility] [place]  [address] [facility] [id number]    Diagnoses and all orders for this visit:    Encounter for follow-up examination after completed treatment for malignant neoplasm    Personal history of malignant neoplasm of esophagus    Personal history of malignant neoplasm of gallbladder        Chief Complaint   Patient presents with    Follow-up     Pt is here for 6 month f/u       No follow-ups on file  Personal history of malignant neoplasm of esophagus    11/11/2014 Initial Diagnosis     Carcinoma of esophagus ([address] )      11/11/2014 Biopsy     GE junction biopsy and distal esophageal biopsy - 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  The tumor is histologically dissimilar to the patient's previously diagnosed adenocarcinoma of the gallbladder      11/24/2014 - 11/26/2014 Chemotherapy     Etoposide with carboplatin      12/17/2014 - 1/20/2015 Radiation     Distal esophagus to 4140 cGy      12/17/2014 - 1/20/2015 Chemotherapy     Concurrent weekly Taxol and carboplatin  1/13/15 had reaction to carboplatin and it was stopped    Taxol only aftyer that      3/4/2015 Surgery     Esophagectomy by Dr Kelly Mahoney      6/25/2015 - 7/29/2015 Chemotherapy     Temodar        Personal history of malignant neoplasm of gallbladder    5/17/2005 Initial Diagnosis     Malignant neoplasm of gallbladder ([facility] [address] )      7/2005 Surgery     Cholecystectomy followed by RFA      8/1/2005 - 9/2/2005 Radiation     Right abdomen/gallbladder area to 4500 cGy      8/1/2005 - 9/6/2005 Chemotherapy     Continuous infusion 5FU with radiation      4/2007 Progression     Recurrent disease      5/30/2007 - 1/30/2008 Chemotherapy     Gemcitabine/cisplatin/carboplatin      2/21/2008 Surgery     Resection of liver lesions and hannah hepatic mass      3/26/2008 - 8/20/2008 Chemotherapy     Gemcitabine/carboplatin         Diagnosis and Staging: Gallbladder cancer: T2N0M0   initially treated 2005  Recurrence treated February 2008  hD8P1L6 30cm and 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  [id number] esophageal cancer March 2015   Treatment History: Radical cholecystectomy with liver resection 2005  Recurrence treated with resection of segment 5 lesion in 2008  Carboplatin and etoposide November 2014 for his esophageal carcinoma  Radiation completed January 20, 2015  Transhiatal esophagectomy, March 2015  Xeloda/Temodar   Current Therapy: Observation   Disease Status: MILTON     History of Present Illness:  Patient returns in follow-up  His only complaint is that he has lost approximately 15 lb over the last 6 months  He has changed his diet somewhat  No nausea or vomiting  CT from January 23, 2020 reveals no evidence of recurrence  I personally reviewed the films  Review of Systems  Complete ROS Surg Onc:   Complete ROS Surg Onc:   Constitutional: The patient has weight loss  No fatigue  Eyes: No complaints of visual problems, no scleral icterus  ENT: no complaints of ear pain, no hoarseness, no difficulty swallowing,  no tinnitus and no new masses in head, oral cavity, or neck     Cardiovascular: No complaints of chest pain, no palpitations, no ankle edema  Respiratory: No complaints of shortness of breath, no cough  Gastrointestinal: No complaints of jaundice, no bloody stools, no pale stools  Genitourinary: No complaints of dysuria, no hematuria, no nocturia, no frequent urination, no urethral discharge  Musculoskeletal: No complaints of weakness, paralysis, joint stiffness or arthralgias  Integumentary: No complaints of rash, no new lesions  Neurological: No complaints of convulsions, no seizures, no dizziness  Hematologic/Lymphatic: No complaints of easy bruising  Endocrine:  No hot or cold intolerance  No polydipsia, polyphagia, or polyuria  Allergy/immunology:  No environmental allergies  No food allergies  Not immunocompromised  Skin:  No pallor or rash  No wound          Patient Active Problem List   Diagnosis    Personal history of malignant neoplasm of esophagus    Personal history of malignant neoplasm of gallbladder    Type 2 diabetes mellitus ([place] [address] )    Hypertension, essential, benign    Coronary artery disease involving native coronary artery of native heart without angina pectoris    Hypercholesterolemia    Encounter for follow-up examination after completed treatment for malignant neoplasm    History of chemotherapy    Secondary malignant neoplasm of mediastinum ([place] [address] )    Moderate protein-calorie malnutrition (HCC)    Heart failure ([place] [address] )     Past Medical History:   Diagnosis Date    Abnormal EKG     Acute myocardial infarction ([place] [address] )     Dry skin     LAST ASSESSED: [id number]    History of chemotherapy     History of external beam radiation therapy     Hypotension     Hypoxia     Liver cancer ([place] [address] )     MDS (myelodysplastic syndrome) ([place] [address] ) 2019    Puncture wound of thumb     LAST ASSESSED: [id number]    Superficial injury of foot     LAST ASSESSED: [id number]    Tachycardia     Type 2 diabetes mellitus ([place] [address] )     LAST ASSESSED: [id number]    Vitamin D deficiency     LAST ASSESSED: [id number]     Past Surgical History:   Procedure Laterality Date    ANGIOPLASTY      PREVIOUS LASER ANGIOPLASTY WITH STENT PLACEMENT    BONE MARROW BIOPSY  2019    CATARACT EXTRACTION W/  INTRAOCULAR LENS IMPLANT Bilateral     CHOLECYSTECTOMY      CORONARY ANGIOPLASTY WITH STENT PLACEMENT      PLACEMENT OF STENT 1    GALLBLADDER SURGERY      HIP SURGERY      TONSILLECTOMY       Family History   Problem Relation Age of Onset    Leukemia Mother     Lymphoma Son     Lymphoma Son     No Known Problems Father      Social History     Socioeconomic History    Marital status:       Spouse name: Praveena Cole Number of children: 3    Years of education: Not on file    Highest education level: Not on file   Occupational History    Occupation: Retired   Social Needs    Financial resource strain: Not on file    Food insecurity:     Worry: Not on file     Inability: Not on file   [employer] needs:     Medical: Not on file     Non-medical: Not on file   Tobacco Use    Smoking status: Former Smoker     Last attempt to quit: 2/15/1975     Years since quittin 0    Smokeless tobacco: Never Used    Tobacco comment: FORMER SMOKER - SMOKED 2 PPD FOR 20 YRS AND QUIT 2/15/1975 AS PER [employer]   Substance and Sexual Activity    Alcohol use: No     Comment: (HISTORY) RARELY AS PER [employer]    Drug use: No    Sexual activity: Not on file   Lifestyle    Physical activity:     Days per week: Not on file     Minutes per session: Not on file    Stress: Not on file   Relationships    Social connections:     Talks on phone: Not on file     Gets together: Not on file     Attends Rastafarian service: Not on file     Active member of club or organization: Not on file     Attends meetings of clubs or organizations: Not on file     Relationship status: Not on file    Intimate partner violence:     Fear of current or ex partner: Not on file     Emotionally abused: Not on file Physically abused: Not on file     Forced sexual activity: Not on file   Other Topics Concern    Not on file   Social History Narrative    Not on file       Current Outpatient Medications:     allopurinol (ZYLOPRIM) 100 mg tablet, Take 1 tablet by mouth 2 (two) times a day, Disp: , Rfl:     aspirin 81 MG tablet, Take 2 tablets by mouth, Disp: , Rfl:     atorvastatin (LIPITOR) 40 mg tablet, Take 40 mg by mouth daily  , Disp: , Rfl:     cyanocobalamin (VITAMIN B-12) 100 mcg tablet, Take by mouth daily  , Disp: , Rfl:     Epoetin Devon (PROCRIT IJ), Inject as directed as needed, Disp: , Rfl:     ergocalciferol (VITAMIN D2) 50,000 units, once a week , Disp: , Rfl:     famotidine (PEPCID) 20 mg tablet, as needed , Disp: , Rfl:     finasteride (PROSCAR) 5 mg tablet, Take 1 tablet (5 mg total) by mouth daily for 180 days, Disp: 90 tablet, Rfl: 1    furosemide (LASIX) 20 mg tablet, Take 20 mg by mouth as needed  , Disp: , Rfl:     KLOR-CON 10 10 MEQ tablet, Take 10 mEq by mouth as needed , Disp: , Rfl:     Magnesium Cl-Calcium Carbonate (SLOW-MAG PO), Take 72 mg by mouth daily, Disp: , Rfl:     Magnesium Oxide 400 MG CAPS, Take 8 capsules by mouth daily , Disp: , Rfl:     nitroglycerin (NITROLINGUAL) 0 4 mg/spray spray, Place 1 spray under the tongue every 5 (five) minutes as needed for chest pain (Patient not taking: Reported on 2/5/2020), Disp: 12 g, Rfl: 5    nystatin (MYCOSTATIN) powder, Apply topically 3 (three) times a day for 30 days, Disp: 15 g, Rfl: 0    polyethylene glycol-propylene glycol (SYSTANE ULTRA) 0 4-0 3 %, Apply 1 drop to eye daily, Disp: , Rfl:   Allergies   Allergen Reactions    Carboplatin Facial Swelling and Rash     Category: Allergy;      Vitals:    02/05/20 1513   BP: 140/80   Pulse: 89   Resp: 16   Temp: 97 9 °F (36 6 °C)       Physical Exam  Constitutional: General appearance: The Patient is well-developed and well-nourished who appears the stated age in no acute distress  Patient is pleasant and talkative  HEENT:  Normocephalic  Sclerae are anicteric  Mucous membranes are moist  Neck is supple without adenopathy  No JVD  Chest: The lungs are clear to auscultation  Cardiac: Heart is regular rate  Abdomen: Abdomen is soft, non-tender, non-distended and without masses  Extremities: There is no clubbing or cyanosis  There is no edema  Symmetric  Neuro: Grossly nonfocal  Gait is normal      Lymphatic: No evidence of cervical adenopathy bilaterally  No evidence of axillary adenopathy bilaterally  No evidence of inguinal adenopathy bilaterally  Skin: Warm, anicteric  Psych:  Patient is pleasant and talkative  Breasts:        Pathology:  [unfilled]    Labs:      Imaging  Ct Chest Abdomen Pelvis W Contrast    Result Date: 1/27/2020  Narrative: CT CHEST, ABDOMEN AND PELVIS WITH IV CONTRAST INDICATION:   Z85 01: Personal history of malignant neoplasm of esophagus  COMPARISON:  CTA abdomen pelvis 4/9/2019, CT chest abdomen pelvis 7/10/2018 TECHNIQUE: CT examination of the chest, abdomen and pelvis was performed  Axial, sagittal, and coronal 2D reformatted images were created from the source data and submitted for interpretation  Radiation dose length product (DLP) for this visit:  434 mGy-cm   This examination, like all CT scans performed in the [facility], was performed utilizing techniques to minimize radiation dose exposure, including the use of iterative reconstruction and automated exposure control  IV Contrast:  100 mL of iohexol (OMNIPAQUE) Enteric Contrast: Enteric contrast was administered  FINDINGS: CHEST LUNGS:  Persistent subpleural reticular opacities and scarring  This is most prominent at the bilateral lung bases and bilateral lung apices  No tracheal or endobronchial lesion  Left lower lobe calcified granuloma redemonstrated  PLEURA:  There are scattered calcified pleural plaques   HEART/GREAT VESSELS:  Unremarkable for patient's age  MEDIASTINUM AND NOLA:  There are scattered subcentimeter mediastinal hilar lymph nodes  Patient is status post gastric pull-through with contrast and debris present within the gastric pull-through  Again noted is mild stenosis at the anastomosis without sniffing change in the interval  CHEST WALL AND LOWER NECK:   There is a left chest wall port with catheter tip in the SVC  ABDOMEN LIVER/BILIARY TREE:  Persistent prominence of the intra and extrahepatic biliary ducts in this patient status post cholecystectomy  GALLBLADDER:  Gallbladder is surgically absent  SPLEEN:  Unremarkable  PANCREAS:  Pancreas is atrophic  ADRENAL GLANDS:  Unremarkable  KIDNEYS/URETERS:  One or more sharply circumscribed subcentimeter renal hypodensities are noted  These lesions are too small to accurately characterize, but are statistically most likely to represent benign cortical renal cyst(s)  According to the guidelines published in the [facility] Paper of the ACR Incidental Findings Committee (Radiology 2010), no further workup of these lesions is recommended  There is bilateral renal cortical scarring  2 mm nonobstructing left upper pole renal calculus  STOMACH AND BOWEL:  Status post gastric pull-through procedure  No evidence of bowel obstruction  Colonic diverticulosis without evidence of acute diverticulitis  APPENDIX:  No findings to suggest appendicitis  ABDOMINOPELVIC CAVITY:  No ascites or free intraperitoneal air  No lymphadenopathy  VESSELS:  Atherosclerotic changes are present  No evidence of aneurysm  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  2 PELVIS Evaluation of the pelvis is limited secondary to streak artifact from left hip arthroplasty  REPRODUCTIVE ORGANS:  Limited evaluation secondary streak artifact from left hip arthroplasty  URINARY BLADDER:  The bladder is decompressed which limits evaluation  Evaluation is limited by streak artifact from left hip arthroplasty   ABDOMINAL WALL/INGUINAL REGIONS: Unremarkable  OSSEOUS STRUCTURES:  No acute fracture or destructive osseous lesion  Impression: 1  Stable postsurgical changes status post esophagectomy with gastric pull-through  2   Subpleural reticular opacities suggesting chronic interstitial change  No evidence of metastatic disease in the chest abdomen or pelvis  Workstation performed: [id number]     I reviewed the above laboratory and imaging data  Discussion/Summary: 48-year-old male with a remote history of gallbladder carcinoma and now with a [id number] esophageal carcinoma  The initial pathology revealed a neuroendocrine carcinoma, but the surgical pathology on the resection revealed adenocarcinoma with neuroendocrine features  At this time he is doing relatively well  He is clinically MILTON from his esophageal cancer at nearly 5 years   He has stopped all his medication  I did discuss with him that his weight loss and change in appetite is very similar to as it was after surgery  At that time he did struggle to maintain his weight  I counseled him on using nutritional supplements as well as ice cream, milk shakes, [place] instant breakfast   He is also using coconut oil to cook  I discussed that if he has continued weight loss to contact us as he may then require a feeding tube  He continues to follow-up with Medical Oncology regularly  I will see him again if he continues to lose weight and would require a feeding tube  He is agreeable to this   All his questions were answered

## 2020-02-20 ENCOUNTER — LAB REQUISITION (OUTPATIENT)
Dept: LAB | Facility: HOSPITAL | Age: 80
End: 2020-02-20
Payer: MEDICARE

## 2020-02-20 DIAGNOSIS — C7A.1 MALIGNANT POORLY DIFFERENTIATED NEUROENDOCRINE TUMORS (HCC): ICD-10-CM

## 2020-02-20 PROCEDURE — 81003 URINALYSIS AUTO W/O SCOPE: CPT | Performed by: INTERNAL MEDICINE

## 2020-02-21 LAB
BILIRUB UR QL STRIP: NEGATIVE
CLARITY UR: CLEAR
COLOR UR: YELLOW
GLUCOSE UR STRIP-MCNC: NEGATIVE MG/DL
HGB UR QL STRIP.AUTO: NEGATIVE
KETONES UR STRIP-MCNC: NEGATIVE MG/DL
LEUKOCYTE ESTERASE UR QL STRIP: NEGATIVE
NITRITE UR QL STRIP: NEGATIVE
PH UR STRIP.AUTO: 5 [PH]
PROT UR STRIP-MCNC: NEGATIVE MG/DL
SP GR UR STRIP.AUTO: 1.02 (ref 1–1.03)
UROBILINOGEN UR QL STRIP.AUTO: 0.2 E.U./DL

## 2020-07-17 ENCOUNTER — OFFICE VISIT (OUTPATIENT)
Dept: FAMILY MEDICINE CLINIC | Facility: CLINIC | Age: 80
End: 2020-07-17
Payer: MEDICARE

## 2020-07-17 VITALS
HEART RATE: 97 BPM | DIASTOLIC BLOOD PRESSURE: 60 MMHG | RESPIRATION RATE: 16 BRPM | BODY MASS INDEX: 18.22 KG/M2 | TEMPERATURE: 97.2 F | WEIGHT: 123 LBS | HEIGHT: 69 IN | SYSTOLIC BLOOD PRESSURE: 118 MMHG | OXYGEN SATURATION: 97 %

## 2020-07-17 DIAGNOSIS — Z85.01 PERSONAL HISTORY OF MALIGNANT NEOPLASM OF ESOPHAGUS: ICD-10-CM

## 2020-07-17 DIAGNOSIS — R63.0 LOSS OF APPETITE: Primary | ICD-10-CM

## 2020-07-17 DIAGNOSIS — C78.1 SECONDARY MALIGNANT NEOPLASM OF MEDIASTINUM (HCC): ICD-10-CM

## 2020-07-17 DIAGNOSIS — R36.1 BLOODY EJACULATION: ICD-10-CM

## 2020-07-17 DIAGNOSIS — R63.4 WEIGHT LOSS: ICD-10-CM

## 2020-07-17 LAB
SL AMB  POCT GLUCOSE, UA: NEGATIVE
SL AMB LEUKOCYTE ESTERASE,UA: NEGATIVE
SL AMB POCT BILIRUBIN,UA: NEGATIVE
SL AMB POCT BLOOD,UA: NEGATIVE
SL AMB POCT CLARITY,UA: CLEAR
SL AMB POCT COLOR,UA: YELLOW
SL AMB POCT KETONES,UA: NEGATIVE
SL AMB POCT NITRITE,UA: NEGATIVE
SL AMB POCT PH,UA: 5.5
SL AMB POCT SPECIFIC GRAVITY,UA: 1.03
SL AMB POCT URINE PROTEIN: NORMAL
SL AMB POCT UROBILINOGEN: 0.2

## 2020-07-17 PROCEDURE — 3078F DIAST BP <80 MM HG: CPT | Performed by: FAMILY MEDICINE

## 2020-07-17 PROCEDURE — 81003 URINALYSIS AUTO W/O SCOPE: CPT | Performed by: FAMILY MEDICINE

## 2020-07-17 PROCEDURE — 99213 OFFICE O/P EST LOW 20 MIN: CPT | Performed by: FAMILY MEDICINE

## 2020-07-17 PROCEDURE — 4040F PNEUMOC VAC/ADMIN/RCVD: CPT | Performed by: FAMILY MEDICINE

## 2020-07-17 PROCEDURE — 1036F TOBACCO NON-USER: CPT | Performed by: FAMILY MEDICINE

## 2020-07-17 PROCEDURE — 3074F SYST BP LT 130 MM HG: CPT | Performed by: FAMILY MEDICINE

## 2020-07-17 PROCEDURE — 1160F RVW MEDS BY RX/DR IN RCRD: CPT | Performed by: FAMILY MEDICINE

## 2020-07-17 PROCEDURE — 3008F BODY MASS INDEX DOCD: CPT | Performed by: FAMILY MEDICINE

## 2020-07-17 NOTE — PROGRESS NOTES
Chief Complaint   Patient presents with    Weight Loss     not eating well, no appetite       Assessment/Plan:  Talk with Oncologist about appetite  BMI Counseling: Body mass index is 18 16 kg/m²  The BMI is below normal  Patient was advised to gain weight  Diagnoses and all orders for this visit:    Loss of appetite  -     Ambulatory referral to Oncology; Future    Weight loss  -     Ambulatory referral to Oncology; Future    Personal history of malignant neoplasm of esophagus    Secondary malignant neoplasm of mediastinum (HCC)          Subjective:      Patient ID: Brittney Schmidt is a 79 y o  male  Loss of appetite and weight loss  The following portions of the patient's history were reviewed and updated as appropriate: allergies, current medications, past medical history, past social history, past surgical history and problem list     Review of Systems   Constitutional: Positive for unexpected weight change  HENT: Negative  Respiratory: Negative  Cardiovascular: Negative  Gastrointestinal: Negative  Genitourinary: Negative  Skin: Negative  Neurological: Negative  Psychiatric/Behavioral: Negative            Objective:      /60 (BP Location: Left arm, Patient Position: Sitting, Cuff Size: Standard)   Pulse 97   Temp (!) 97 2 °F (36 2 °C) (Tympanic)   Resp 16   Ht 5' 9" (1 753 m)   Wt 55 8 kg (123 lb)   SpO2 97%   BMI 18 16 kg/m²       Current Outpatient Medications:     aspirin 81 MG tablet, Take 2 tablets by mouth, Disp: , Rfl:     allopurinol (ZYLOPRIM) 100 mg tablet, Take 1 tablet by mouth 2 (two) times a day, Disp: , Rfl:     atorvastatin (LIPITOR) 40 mg tablet, Take 40 mg by mouth daily  , Disp: , Rfl:     cyanocobalamin (VITAMIN B-12) 100 mcg tablet, Take by mouth daily  , Disp: , Rfl:     Epoetin Devon (PROCRIT IJ), Inject as directed as needed, Disp: , Rfl:     ergocalciferol (VITAMIN D2) 50,000 units, once a week , Disp: , Rfl:     famotidine (PEPCID) 20 mg tablet, as needed , Disp: , Rfl:     finasteride (PROSCAR) 5 mg tablet, Take 1 tablet (5 mg total) by mouth daily for 180 days, Disp: 90 tablet, Rfl: 1    furosemide (LASIX) 20 mg tablet, Take 20 mg by mouth as needed  , Disp: , Rfl:     KLOR-CON 10 10 MEQ tablet, Take 10 mEq by mouth as needed , Disp: , Rfl:     Magnesium Cl-Calcium Carbonate (SLOW-MAG PO), Take 72 mg by mouth daily, Disp: , Rfl:     Magnesium Oxide 400 MG CAPS, Take 8 capsules by mouth daily , Disp: , Rfl:     nitroglycerin (NITROLINGUAL) 0 4 mg/spray spray, Place 1 spray under the tongue every 5 (five) minutes as needed for chest pain (Patient not taking: Reported on 2/5/2020), Disp: 12 g, Rfl: 5    nystatin (MYCOSTATIN) powder, Apply topically 3 (three) times a day for 30 days, Disp: 15 g, Rfl: 0    polyethylene glycol-propylene glycol (SYSTANE ULTRA) 0 4-0 3 %, Apply 1 drop to eye daily, Disp: , Rfl:      Physical Exam   Constitutional: He is oriented to person, place, and time  Thin and frail looking  HENT:   Head: Normocephalic and atraumatic  Nose: Nose normal    Eyes: Pupils are equal, round, and reactive to light  Conjunctivae and EOM are normal    Neck: Normal range of motion  Neck supple  Cardiovascular: Normal rate, regular rhythm and normal heart sounds  Pulmonary/Chest: Breath sounds normal    Neurological: He is alert and oriented to person, place, and time  Skin: Skin is warm and dry

## 2020-07-29 ENCOUNTER — LAB REQUISITION (OUTPATIENT)
Dept: LAB | Facility: HOSPITAL | Age: 80
End: 2020-07-29
Payer: MEDICARE

## 2020-07-29 DIAGNOSIS — Z79.899 OTHER LONG TERM (CURRENT) DRUG THERAPY: ICD-10-CM

## 2020-07-29 DIAGNOSIS — C7A.1 MALIGNANT POORLY DIFFERENTIATED NEUROENDOCRINE TUMORS (HCC): ICD-10-CM

## 2020-07-29 DIAGNOSIS — E83.42 HYPOMAGNESEMIA: ICD-10-CM

## 2020-07-29 LAB
T3FREE SERPL-MCNC: 2.32 PG/ML (ref 2.3–4.2)
T4 FREE SERPL-MCNC: 1.08 NG/DL (ref 0.76–1.46)
TIBC SERPL-MCNC: 219 UG/DL (ref 250–450)
TRANSFERRIN SERPL-MCNC: 181 MG/DL (ref 200–400)

## 2020-07-29 PROCEDURE — 84481 FREE ASSAY (FT-3): CPT | Performed by: NURSE PRACTITIONER

## 2020-07-29 PROCEDURE — 84439 ASSAY OF FREE THYROXINE: CPT | Performed by: NURSE PRACTITIONER

## 2020-07-29 PROCEDURE — 83550 IRON BINDING TEST: CPT | Performed by: NURSE PRACTITIONER

## 2020-07-29 PROCEDURE — 84466 ASSAY OF TRANSFERRIN: CPT | Performed by: NURSE PRACTITIONER

## 2020-07-30 ENCOUNTER — TRANSCRIBE ORDERS (OUTPATIENT)
Dept: ADMINISTRATIVE | Facility: HOSPITAL | Age: 80
End: 2020-07-30

## 2020-07-30 DIAGNOSIS — C78.1 SECONDARY MALIGNANT NEOPLASM OF MEDIASTINUM (HCC): Primary | ICD-10-CM

## 2020-07-30 DIAGNOSIS — C23 MALIGNANT NEOPLASM OF GALLBLADDER (HCC): ICD-10-CM

## 2020-08-04 ENCOUNTER — HOSPITAL ENCOUNTER (OUTPATIENT)
Dept: RADIOLOGY | Age: 80
Discharge: HOME | End: 2020-08-04
Payer: MEDICARE

## 2020-08-04 DIAGNOSIS — C23 MALIGNANT NEOPLASM OF GALLBLADDER (HCC): ICD-10-CM

## 2020-08-04 DIAGNOSIS — C78.1 SECONDARY MALIGNANT NEOPLASM OF MEDIASTINUM (HCC): ICD-10-CM

## 2020-08-04 PROCEDURE — 71260 CT THORAX DX C+: CPT

## 2020-08-04 PROCEDURE — 74177 CT ABD & PELVIS W/CONTRAST: CPT

## 2020-08-04 RX ADMIN — IODIXANOL 100 ML: 320 INJECTION, SOLUTION INTRAVASCULAR at 12:51

## 2020-09-15 ENCOUNTER — LAB REQUISITION (OUTPATIENT)
Dept: LAB | Facility: HOSPITAL | Age: 80
End: 2020-09-15
Payer: MEDICARE

## 2020-09-15 DIAGNOSIS — D46.9 MYELODYSPLASTIC SYNDROME, UNSPECIFIED (HCC): ICD-10-CM

## 2020-09-15 DIAGNOSIS — C23 MALIGNANT NEOPLASM OF GALLBLADDER (HCC): ICD-10-CM

## 2020-09-15 DIAGNOSIS — C7A.1 MALIGNANT POORLY DIFFERENTIATED NEUROENDOCRINE TUMORS (HCC): ICD-10-CM

## 2020-09-15 DIAGNOSIS — R63.4 ABNORMAL WEIGHT LOSS: ICD-10-CM

## 2020-09-15 LAB
BACTERIA UR QL AUTO: ABNORMAL /HPF
BILIRUB UR QL STRIP: NEGATIVE
CAOX CRY URNS QL MICRO: ABNORMAL /HPF
CLARITY UR: CLEAR
COLOR UR: YELLOW
EST. AVERAGE GLUCOSE BLD GHB EST-MCNC: 148 MG/DL
GLUCOSE UR STRIP-MCNC: ABNORMAL MG/DL
HBA1C MFR BLD: 6.8 %
HGB UR QL STRIP.AUTO: NEGATIVE
HYALINE CASTS #/AREA URNS LPF: ABNORMAL /LPF
KETONES UR STRIP-MCNC: NEGATIVE MG/DL
LEUKOCYTE ESTERASE UR QL STRIP: NEGATIVE
MUCOUS THREADS UR QL AUTO: ABNORMAL
NITRITE UR QL STRIP: NEGATIVE
NON-SQ EPI CELLS URNS QL MICRO: ABNORMAL /HPF
PH UR STRIP.AUTO: 5 [PH]
PROT UR STRIP-MCNC: NEGATIVE MG/DL
RBC #/AREA URNS AUTO: ABNORMAL /HPF
SP GR UR STRIP.AUTO: 1.02 (ref 1–1.03)
UROBILINOGEN UR QL STRIP.AUTO: 0.2 E.U./DL
WBC #/AREA URNS AUTO: ABNORMAL /HPF

## 2020-09-15 PROCEDURE — 87040 BLOOD CULTURE FOR BACTERIA: CPT | Performed by: INTERNAL MEDICINE

## 2020-09-15 PROCEDURE — 83036 HEMOGLOBIN GLYCOSYLATED A1C: CPT | Performed by: INTERNAL MEDICINE

## 2020-09-15 PROCEDURE — 81001 URINALYSIS AUTO W/SCOPE: CPT | Performed by: INTERNAL MEDICINE

## 2020-09-20 LAB
BACTERIA BLD CULT: NORMAL
BACTERIA BLD CULT: NORMAL

## 2020-09-30 ENCOUNTER — ANESTHESIA (OUTPATIENT)
Dept: GASTROENTEROLOGY | Facility: HOSPITAL | Age: 80
End: 2020-09-30

## 2020-09-30 ENCOUNTER — HOSPITAL ENCOUNTER (OUTPATIENT)
Dept: GASTROENTEROLOGY | Facility: HOSPITAL | Age: 80
Setting detail: OUTPATIENT SURGERY
Discharge: HOME | End: 2020-09-30
Attending: INTERNAL MEDICINE | Admitting: INTERNAL MEDICINE
Payer: MEDICARE

## 2020-09-30 ENCOUNTER — ANESTHESIA EVENT (OUTPATIENT)
Dept: GASTROENTEROLOGY | Facility: HOSPITAL | Age: 80
End: 2020-09-30

## 2020-09-30 VITALS
TEMPERATURE: 97.4 F | WEIGHT: 120 LBS | OXYGEN SATURATION: 99 % | DIASTOLIC BLOOD PRESSURE: 60 MMHG | HEIGHT: 69 IN | HEART RATE: 72 BPM | SYSTOLIC BLOOD PRESSURE: 104 MMHG | RESPIRATION RATE: 18 BRPM | BODY MASS INDEX: 17.77 KG/M2

## 2020-09-30 VITALS — HEART RATE: 86 BPM

## 2020-09-30 DIAGNOSIS — D12.6 BENIGN NEOPLASM OF COLON, UNSPECIFIED: ICD-10-CM

## 2020-09-30 DIAGNOSIS — R63.4 ABNORMAL WEIGHT LOSS: ICD-10-CM

## 2020-09-30 PROCEDURE — 88305 TISSUE EXAM BY PATHOLOGIST: CPT | Performed by: PATHOLOGY

## 2020-09-30 RX ORDER — PROPOFOL 10 MG/ML
INJECTION, EMULSION INTRAVENOUS AS NEEDED
Status: DISCONTINUED | OUTPATIENT
Start: 2020-09-30 | End: 2020-09-30

## 2020-09-30 RX ORDER — EPHEDRINE SULFATE 50 MG/ML
INJECTION INTRAVENOUS AS NEEDED
Status: DISCONTINUED | OUTPATIENT
Start: 2020-09-30 | End: 2020-09-30

## 2020-09-30 RX ORDER — PROPOFOL 10 MG/ML
INJECTION, EMULSION INTRAVENOUS CONTINUOUS PRN
Status: DISCONTINUED | OUTPATIENT
Start: 2020-09-30 | End: 2020-09-30

## 2020-09-30 RX ORDER — LIDOCAINE HYDROCHLORIDE 10 MG/ML
INJECTION, SOLUTION EPIDURAL; INFILTRATION; INTRACAUDAL; PERINEURAL AS NEEDED
Status: DISCONTINUED | OUTPATIENT
Start: 2020-09-30 | End: 2020-09-30

## 2020-09-30 RX ORDER — SODIUM CHLORIDE 9 MG/ML
INJECTION, SOLUTION INTRAVENOUS CONTINUOUS PRN
Status: DISCONTINUED | OUTPATIENT
Start: 2020-09-30 | End: 2020-09-30

## 2020-09-30 RX ORDER — SODIUM CHLORIDE 9 MG/ML
125 INJECTION, SOLUTION INTRAVENOUS CONTINUOUS
Status: DISCONTINUED | OUTPATIENT
Start: 2020-09-30 | End: 2020-10-04 | Stop reason: HOSPADM

## 2020-09-30 RX ADMIN — LIDOCAINE HYDROCHLORIDE 50 MG: 10 INJECTION, SOLUTION EPIDURAL; INFILTRATION; INTRACAUDAL; PERINEURAL at 13:24

## 2020-09-30 RX ADMIN — EPHEDRINE SULFATE 10 MG: 50 INJECTION, SOLUTION INTRAVENOUS at 13:37

## 2020-09-30 RX ADMIN — PHENYLEPHRINE HYDROCHLORIDE 100 MCG: 10 INJECTION INTRAVENOUS at 13:29

## 2020-09-30 RX ADMIN — PHENYLEPHRINE HYDROCHLORIDE 100 MCG: 10 INJECTION INTRAVENOUS at 13:31

## 2020-09-30 RX ADMIN — SODIUM CHLORIDE: 9 INJECTION, SOLUTION INTRAVENOUS at 13:23

## 2020-09-30 RX ADMIN — PROPOFOL 80 MCG/KG/MIN: 10 INJECTION, EMULSION INTRAVENOUS at 13:27

## 2020-09-30 RX ADMIN — PROPOFOL 80 MG: 10 INJECTION, EMULSION INTRAVENOUS at 13:27

## 2020-09-30 RX ADMIN — PHENYLEPHRINE HYDROCHLORIDE 100 MCG: 10 INJECTION INTRAVENOUS at 13:34

## 2020-09-30 RX ADMIN — SODIUM CHLORIDE 125 ML/HR: 0.9 INJECTION, SOLUTION INTRAVENOUS at 13:00

## 2020-09-30 NOTE — ANESTHESIA PREPROCEDURE EVALUATION
Procedure:  COLONOSCOPY    Relevant Problems   CARDIO   (+) Acute myocardial infarction ([facility] [address] [address] ) (Resolved)   (+) Coronary artery disease involving native coronary artery of native heart without angina pectoris   (+) Hypercholesterolemia   (+) Hypertension, essential, benign      ENDO   (+) Type 2 diabetes mellitus (HCC)      NEURO/PSYCH   (+) Encounter for follow-up examination after completed treatment for malignant neoplasm   (+) Personal history of malignant neoplasm of esophagus   (+) Personal history of malignant neoplasm of gallbladder      Other   (+) Heart failure ([facility] [address] )   (+) History of chemotherapy   (+) Moderate protein-calorie malnutrition ([facility] [address] )   (+) Secondary malignant neoplasm of mediastinum ([facility] [address] )     Recent Results (from the past 26397 hour(s))   Echo complete with contrast if indicated    Narrative    [facility] [address]  [phone]    Transthoracic Echocardiogram  2D, M-mode, Doppler, and Color Doppler    Study date:  19-Dec-2019    Patient: Alyssa Werner  MR number: [id number]  Account number: [de-identified]  : 15-Rocael-1940  Age: [de-identified] years  Gender: Male  Status: Outpatient  Location: [address] [place] [address] [facility] [place]  Height: 69 in  Weight: 142 lb  BP: 110/ 60 mmHg    Indications: Coronary artery disease    Diagnoses: I25 10 - Atherosclerotic heart disease of native coronary artery without angina pectoris    Sonographer:  LA Guerrero  Primary Physician:  Erwin Avalos DO  Referring Physician:  Mikaela Lutz MD  Group:  [facility] [id number]  Cardiology Fellow: Bud Pelaez MD  Interpreting Physician:  Teo Archibald MD    SUMMARY    LEFT VENTRICLE:  Size was at the upper limits of normal   Systolic function was mildly reduced  Ejection fraction was estimated to be 45 %  There was severe hypokinesis of the basal-mid inferoseptal, basal inferior, and basal-mid inferolateral wall(s)    Doppler parameters were consistent with abnormal left ventricular relaxation (grade 1 diastolic dysfunction)  RIGHT VENTRICLE:  The size was normal   Systolic function was normal     MITRAL VALVE:  There was mild regurgitation  AORTIC VALVE:  There was mild regurgitation  COMPARISONS:  There has been no significant interval change  Comparison was made with the previous study of 22-Dec-2017  HISTORY: PRIOR HISTORY: Hypertension, high cholesterol, coronary artery disease, myocardial infarction, tachycardia, liver cancer, chemotherapy    PROCEDURE: The study was performed in the [address]  This was a routine study  The transthoracic approach was used  The study included complete 2D imaging, M-mode, complete spectral Doppler, and color Doppler  This  was a technically difficult study  LEFT VENTRICLE: Size was at the upper limits of normal  Systolic function was mildly reduced  Ejection fraction was estimated to be 45 %  There was severe hypokinesis of the basal-mid inferoseptal, basal inferior, and basal-mid  inferolateral wall(s)  Wall thickness was normal  There was no evidence of concentric hypertrophy  DOPPLER: Doppler parameters were consistent with abnormal left ventricular relaxation (grade 1 diastolic dysfunction)  RIGHT VENTRICLE: The size was normal  Systolic function was normal     LEFT ATRIUM: Size was normal     RIGHT ATRIUM: Size was normal     MITRAL VALVE: There was mild annular calcification  There was normal leaflet separation  DOPPLER: The transmitral velocity was within the normal range  There was no evidence for stenosis  There was mild regurgitation  AORTIC VALVE: The valve was trileaflet  Leaflets exhibited mild calcification, normal cuspal separation, and sclerosis  DOPPLER: Transaortic velocity was within the normal range  There was no evidence for stenosis  There was mild  regurgitation  TRICUSPID VALVE: The valve structure was normal  There was normal leaflet separation   DOPPLER: The transtricuspid velocity was within the normal range  There was no evidence for stenosis  There was trace regurgitation  Pulmonary artery  systolic pressure was within the normal range  Estimated peak PA pressure was 32 mmHg  PULMONIC VALVE: Not well visualized  DOPPLER: The transpulmonic velocity was within the normal range  There was trace regurgitation  PERICARDIUM: There was no pericardial effusion  AORTA: The root exhibited normal size  SYSTEMIC VEINS: IVC: The inferior vena cava was normal in size and course  Respirophasic changes were normal     PULMONARY VEINS: DOPPLER: Doppler flow pattern was normal in the pulmonary vein(s)  SYSTEM MEASUREMENT TABLES    2D  %FS: 24 57 %  Ao Diam: 3 06 cm  EDV(Teich): 120 35 ml  EF(Cube): 57 09 %  EF(Teich): 48 52 %  ESV(Cube): 54 88 ml  ESV(Teich): 61 96 ml  IVSd: 0 69 cm  LA Area: 13 1 cm2  LA Diam: 3 05 cm  LVEDV MOD A4C: 137 64 ml  LVEF MOD A4C: 57 14 %  LVESV MOD A4C: 58 99 ml  LVIDd: 5 04 cm  LVIDs: 3 8 cm  LVLd A4C: 7 2 cm  LVLs A4C: 5 85 cm  LVPWd: 0 84 cm  RA Area: 14 3 cm2  RV Diam : 3 6 cm  SV MOD A4C: 78 65 ml  SV(Cube): 73 ml  SV(Teich): 58 39 ml    CW  AR Dec [place]: 2 38 m/s2  AR Dec Time: 1784 8 ms  AR PHT: 517 59 ms  AR Vmax: 4 13 m/s  AR maxP 12 mmHg  TR Vmax: 2 58 m/s  TR maxP 65 mmHg    MM  TAPSE: 2 37 cm    PW  AVC: 381 34 ms  E': 0 04 m/s  E/E': 18 18  MV A Frandy: 1 02 m/s  MV Dec [place]: 2 1 m/s2  MV DecT: 345 06 ms  MV E Frandy: 0 73 m/s  MV E/A Ratio: 0 71    [employer] Accredited Echocardiography Laboratory    Prepared and electronically signed by    Poppy Schwab MD  Signed 19-Dec-2019 12:23:16     POCT ECG    Impression    Normal sinus rhythm with first-degree AV block possible inferior infarct old, possible anterior infarct old    No new changes   NM myocardial perfusion spect (rx stress and/or rest)    [address] [address]  [phone]    Stress Gated SPECT Myocardial Perfusion Imaging after Regadenoson    Patient: Ana Beltran  MR number: [id number]  Account number: [de-identified]  : 1940  Age: 77 years  Gender: Male  Status: Outpatient  Location: [address] [place] [address] and [facility] [place]  Height: 69 in  Weight: 156 lb  BP: 136/ 64 mmHg    Allergies: CARBOPLATIN    Diagnosis: I25 10 - Atherosclerotic heart disease of native coronary artery without angina pectoris    Interpreting Physician:  Shashank Ramirez DO  Referring Physician:  Jovon Patel MD  Primary Physician:  Loyd Castaneda DO  Technician:  Arnita Cranker  RN:  Toshia Linn RN  Group:  [facility]  Cardiology Fellow:  Dominique Tam MD  Report Prepared by[de-identified]  Toshia Linn RN    INDICATIONS: Evaluation of known coronary artery disease  HISTORY: The patient is a 77year old  male  History of left hip replacement Chest pain status: no chest pain  Coronary artery disease risk factors: dyslipidemia, hypertension, former smoking and diabetes mellitus  Cardiovascular history: coronary artery disease and prior myocardial  infarction () Prior cardiovascular procedures: percutaneous transluminal coronary angioplasty s/p stent x 1 Co-morbidity: chemotherapy recipient for esophageal and gallbladder cancer Medications: a diuretic, aspirin, and a lipid  lowering agent  PHYSICAL EXAM: Baseline physical exam screening: no wheezes audible  REST ECG: Normal sinus rhythm  The ECG showed 1° AV block and type 1, 2° AV block  Nonspecific ST and T wave abnormalities were present  PROCEDURE: The study was performed in the the [facility]  A regadenoson infusion pharmacologic stress test was performed  Gated SPECT myocardial perfusion imaging was performed during stress  Systolic blood pressure  was 136 mmHg, at the start of the study  Diastolic blood pressure was 64 mmHg, at the start of the study   The heart rate was 60 bpm, at the start of the study  IV double checked  Regadenoson protocol:  HR bpm SBP mmHg DBP mmHg Symptoms  Baseline 60 136 64 none  Immediate 94 140 62 none  1 min 71 122 64 none  No medications or fluids given  The patient also performed low level exercise  STRESS SUMMARY: Duration of pharmacologic stress was 3 min  Functional capacity could not be adequately assessed  Maximal heart rate during stress was 94 bpm  The rate-pressure product for the peak heart rate and blood pressure was 03594  There was no chest pain during stress  The stress test was terminated due to protocol completion  Pre oxygen saturation: 99 %  Peak oxygen saturation: 100 %  The stress ECG was negative for ischemia with vasodilator stress  Arrhythmia  during stress: isolated premature ventricular beats  ISOTOPE ADMINISTRATION:  Resting isotope administration Stress isotope administration  Agent Tetrofosmin Tetrofosmin  Dose 10 mCi 30 6 mCi  Date 11/28/2018 11/28/2018  Injection time 08:15 09:50  Imaging time 09:04 10:47  Injection-image interval 49 min 57 min    The radiopharmaceutical was injected at the peak effect of pharmacologic stress  MYOCARDIAL PERFUSION IMAGING:  The image quality was fair  Rotating projection images reveal mild subdiaphragmatic activity  Left ventricular size was normal  The TID ratio was 0 97  PERFUSION DEFECTS:  -  There was a moderate to large, severe, fixed myocardial perfusion defect  GATED SPECT:  The calculated left ventricular ejection fraction was 55 %  Left ventricular ejection fraction was within normal limits by visual estimate  There was severely reduced myocardial thickening and motion of the lateral wall of the left  ventricle  SUMMARY:  -  Stress results: There was no chest pain during stress  -  ECG conclusions: The stress ECG was negative for ischemia with vasodilator stress  -  Perfusion imaging: There was a moderate to large, severe, fixed myocardial perfusion defect    - Gated SPECT: The calculated left ventricular ejection fraction was 55 %  Left ventricular ejection fraction was within normal limits by visual estimate  There was severely reduced myocardial thickening and motion of the lateral wall of  the left ventricle  IMPRESSIONS: Abnormal study after pharmacologic vasodilation  There was a large infarct in the inferolateral region  Overall left ventricular systolic function was preserved, but there were regional wall motion abnormalities  Prepared and signed by    Adri Quintero,   Signed 11/28/2018 11:31:24       Recent Results (from the past 672 hour(s))   Urinalysis with microscopic    Collection Time: 09/15/20  3:23 PM   Result Value Ref Range    Clarity, UA Clear     Color, UA Yellow     Specific Gravity, UA 1 025 1 003 - 1 030    pH, UA 5 0 4 5, 5 0, 5 5, 6 0, 6 5, 7 0, 7 5, 8 0    Glucose,  (1/4%) (A) Negative mg/dl    Ketones, UA Negative Negative mg/dl    Blood, UA Negative Negative    Protein, UA Negative Negative mg/dl    Nitrite, UA Negative Negative    Bilirubin, UA Negative Negative    Urobilinogen, UA 0 2 0 2, 1 0 E U /dl E U /dl    Leukocytes, UA Negative Negative    WBC, UA None Seen None Seen, 0-5, 5-55, 5-65 /hpf    RBC, UA None Seen None Seen, 0-5 /hpf    Hyaline Casts, UA 10-25 (A) None Seen /lpf    Bacteria, UA None Seen None Seen, Occasional /hpf    Ca Oxalate Nu, UA Moderate (A) None Seen /hpf    Epithelial Cells Occasional None Seen, Occasional /hpf    MUCUS THREADS Occasional (A) None Seen   Hemoglobin A1C    Collection Time: 09/15/20  3:23 PM   Result Value Ref Range    Hemoglobin A1C 6 8 (H) Normal 3 8-5 6%; PreDiabetic 5 7-6 4%; Diabetic >=6 5%; Glycemic control for adults with diabetes <7 0% %     mg/dl   Blood culture    Collection Time: 09/15/20  3:23 PM    Specimen: Blood   Result Value Ref Range    Blood Culture No Growth After 5 Days      Blood culture    Collection Time: 09/15/20  3:23 PM    Specimen: Blood Result Value Ref Range    Blood Culture No Growth After 5 Days  Physical Exam    Airway    Mallampati score: II  TM Distance: >3 FB  Neck ROM: full     Dental       Cardiovascular  Rhythm: regular, Rate: normal, No murmur,     Pulmonary  Breath sounds clear to auscultation,     Other Findings        Anesthesia Plan  ASA Score- 3     Anesthesia Type- IV sedation with anesthesia with ASA Monitors  Additional Monitors:   Airway Plan:           Plan Factors-    Chart reviewed  EKG reviewed  Imaging results reviewed  Existing labs reviewed  Patient summary reviewed  Induction-     Postoperative Plan-     Informed Consent- Anesthetic plan and risks discussed with patient  I personally reviewed this patient with the CRNA  Discussed and agreed on the Anesthesia Plan with the CRNA  Jamee Davidson

## 2020-09-30 NOTE — ANESTHESIA POSTPROCEDURE EVALUATION
Post-Op Assessment Note    CV Status:  Stable    Pain management: adequate     Mental Status:  Alert and awake   Hydration Status:  Euvolemic   PONV Controlled:  Controlled   Airway Patency:  Patent      Post Op Vitals Reviewed: Yes      Staff: Anesthesiologist, CRNA         No complications documented      BP      Temp     Pulse     Resp      SpO2

## 2020-11-02 ENCOUNTER — HOSPITAL ENCOUNTER (INPATIENT)
Facility: HOSPITAL | Age: 80
LOS: 7 days | Discharge: HOME | DRG: 641 | End: 2020-11-09
Attending: EMERGENCY MEDICINE | Admitting: HOSPITALIST
Payer: MEDICARE

## 2020-11-02 DIAGNOSIS — R62.7 FAILURE TO THRIVE IN ADULT: ICD-10-CM

## 2020-11-02 DIAGNOSIS — I48.0 PAF (PAROXYSMAL ATRIAL FIBRILLATION) (HCC): ICD-10-CM

## 2020-11-02 DIAGNOSIS — I48.91 NEW ONSET A-FIB (HCC): ICD-10-CM

## 2020-11-02 DIAGNOSIS — R62.7 FAILURE TO THRIVE SYNDROME, ADULT: Primary | ICD-10-CM

## 2020-11-02 PROBLEM — R53.83 OTHER FATIGUE: Status: ACTIVE | Noted: 2020-11-02

## 2020-11-02 PROBLEM — E46 PROTEIN CALORIE MALNUTRITION (HCC): Status: ACTIVE | Noted: 2020-01-08

## 2020-11-02 PROBLEM — E55.9 VITAMIN D DEFICIENCY: Status: ACTIVE | Noted: 2020-11-02

## 2020-11-02 PROBLEM — N18.32 STAGE 3B CHRONIC KIDNEY DISEASE (HCC): Status: ACTIVE | Noted: 2020-11-02

## 2020-11-02 PROBLEM — R62.51 FAILURE TO THRIVE (0-17): Status: ACTIVE | Noted: 2020-11-02

## 2020-11-02 LAB
ALBUMIN SERPL BCP-MCNC: 2.7 G/DL (ref 3.5–5)
ALP SERPL-CCNC: 64 U/L (ref 46–116)
ALT SERPL W P-5'-P-CCNC: 10 U/L (ref 12–78)
ANION GAP SERPL CALCULATED.3IONS-SCNC: 6 MMOL/L (ref 4–13)
AST SERPL W P-5'-P-CCNC: 16 U/L (ref 5–45)
ATRIAL RATE: 357 BPM
ATRIAL RATE: 394 BPM
BACTERIA UR QL AUTO: ABNORMAL /HPF
BASOPHILS # BLD AUTO: 0.02 THOUSANDS/ΜL (ref 0–0.1)
BASOPHILS NFR BLD AUTO: 0 % (ref 0–1)
BILIRUB SERPL-MCNC: 0.61 MG/DL (ref 0.2–1)
BILIRUB UR QL STRIP: ABNORMAL
BUN SERPL-MCNC: 24 MG/DL (ref 5–25)
CALCIUM ALBUM COR SERPL-MCNC: 10.3 MG/DL (ref 8.3–10.1)
CALCIUM SERPL-MCNC: 9.3 MG/DL (ref 8.3–10.1)
CHLORIDE SERPL-SCNC: 104 MMOL/L (ref 100–108)
CLARITY UR: CLEAR
CO2 SERPL-SCNC: 29 MMOL/L (ref 21–32)
COLOR UR: ABNORMAL
CREAT SERPL-MCNC: 1.46 MG/DL (ref 0.6–1.3)
EOSINOPHIL # BLD AUTO: 0.01 THOUSAND/ΜL (ref 0–0.61)
EOSINOPHIL NFR BLD AUTO: 0 % (ref 0–6)
ERYTHROCYTE [DISTWIDTH] IN BLOOD BY AUTOMATED COUNT: 14 % (ref 11.6–15.1)
GFR SERPL CREATININE-BSD FRML MDRD: 44 ML/MIN/1.73SQ M
GLUCOSE SERPL-MCNC: 117 MG/DL (ref 65–140)
GLUCOSE SERPL-MCNC: 189 MG/DL (ref 65–140)
GLUCOSE SERPL-MCNC: 78 MG/DL (ref 65–140)
GLUCOSE UR STRIP-MCNC: ABNORMAL MG/DL
HCT VFR BLD AUTO: 39.9 % (ref 36.5–49.3)
HGB BLD-MCNC: 12.5 G/DL (ref 12–17)
HGB UR QL STRIP.AUTO: NEGATIVE
HYALINE CASTS #/AREA URNS LPF: ABNORMAL /LPF
IMM GRANULOCYTES # BLD AUTO: 0.07 THOUSAND/UL (ref 0–0.2)
IMM GRANULOCYTES NFR BLD AUTO: 1 % (ref 0–2)
KETONES UR STRIP-MCNC: ABNORMAL MG/DL
LEUKOCYTE ESTERASE UR QL STRIP: NEGATIVE
LIPASE SERPL-CCNC: 18 U/L (ref 73–393)
LYMPHOCYTES # BLD AUTO: 0.62 THOUSANDS/ΜL (ref 0.6–4.47)
LYMPHOCYTES NFR BLD AUTO: 7 % (ref 14–44)
MCH RBC QN AUTO: 30.6 PG (ref 26.8–34.3)
MCHC RBC AUTO-ENTMCNC: 31.3 G/DL (ref 31.4–37.4)
MCV RBC AUTO: 98 FL (ref 82–98)
MONOCYTES # BLD AUTO: 0.87 THOUSAND/ΜL (ref 0.17–1.22)
MONOCYTES NFR BLD AUTO: 10 % (ref 4–12)
NEUTROPHILS # BLD AUTO: 7.43 THOUSANDS/ΜL (ref 1.85–7.62)
NEUTS SEG NFR BLD AUTO: 82 % (ref 43–75)
NITRITE UR QL STRIP: NEGATIVE
NON-SQ EPI CELLS URNS QL MICRO: ABNORMAL /HPF
NRBC BLD AUTO-RTO: 0 /100 WBCS
PH UR STRIP.AUTO: 5 [PH]
PLATELET # BLD AUTO: 134 THOUSANDS/UL (ref 149–390)
PMV BLD AUTO: 11.3 FL (ref 8.9–12.7)
POTASSIUM SERPL-SCNC: 4.2 MMOL/L (ref 3.5–5.3)
PREALB SERPL-MCNC: 9.4 MG/DL (ref 18–40)
PROT SERPL-MCNC: 7.9 G/DL (ref 6.4–8.2)
PROT UR STRIP-MCNC: ABNORMAL MG/DL
QRS AXIS: -1 DEGREES
QRS AXIS: 9 DEGREES
QRSD INTERVAL: 102 MS
QRSD INTERVAL: 98 MS
QT INTERVAL: 408 MS
QT INTERVAL: 424 MS
QTC INTERVAL: 416 MS
QTC INTERVAL: 431 MS
RBC # BLD AUTO: 4.09 MILLION/UL (ref 3.88–5.62)
RBC #/AREA URNS AUTO: ABNORMAL /HPF
SODIUM SERPL-SCNC: 139 MMOL/L (ref 136–145)
SP GR UR STRIP.AUTO: 1.03 (ref 1–1.03)
T WAVE AXIS: 130 DEGREES
T WAVE AXIS: 82 DEGREES
UROBILINOGEN UR QL STRIP.AUTO: 1 E.U./DL
VENTRICULAR RATE: 58 BPM
VENTRICULAR RATE: 67 BPM
WBC # BLD AUTO: 9.02 THOUSAND/UL (ref 4.31–10.16)
WBC #/AREA URNS AUTO: ABNORMAL /HPF

## 2020-11-02 PROCEDURE — 81001 URINALYSIS AUTO W/SCOPE: CPT | Performed by: INTERNAL MEDICINE

## 2020-11-02 PROCEDURE — 99285 EMERGENCY DEPT VISIT HI MDM: CPT

## 2020-11-02 PROCEDURE — 90662 IIV NO PRSV INCREASED AG IM: CPT | Performed by: HOSPITALIST

## 2020-11-02 PROCEDURE — G0008 ADMIN INFLUENZA VIRUS VAC: HCPCS | Performed by: HOSPITALIST

## 2020-11-02 PROCEDURE — 99223 1ST HOSP IP/OBS HIGH 75: CPT | Performed by: INTERNAL MEDICINE

## 2020-11-02 PROCEDURE — 36415 COLL VENOUS BLD VENIPUNCTURE: CPT | Performed by: SURGERY

## 2020-11-02 PROCEDURE — 85025 COMPLETE CBC W/AUTO DIFF WBC: CPT | Performed by: EMERGENCY MEDICINE

## 2020-11-02 PROCEDURE — NC001 PR NO CHARGE: Performed by: SURGERY

## 2020-11-02 PROCEDURE — 80053 COMPREHEN METABOLIC PANEL: CPT | Performed by: EMERGENCY MEDICINE

## 2020-11-02 PROCEDURE — 93005 ELECTROCARDIOGRAM TRACING: CPT

## 2020-11-02 PROCEDURE — 82948 REAGENT STRIP/BLOOD GLUCOSE: CPT

## 2020-11-02 PROCEDURE — 84134 ASSAY OF PREALBUMIN: CPT | Performed by: SURGERY

## 2020-11-02 PROCEDURE — 96365 THER/PROPH/DIAG IV INF INIT: CPT

## 2020-11-02 PROCEDURE — 83690 ASSAY OF LIPASE: CPT | Performed by: EMERGENCY MEDICINE

## 2020-11-02 PROCEDURE — 93010 ELECTROCARDIOGRAM REPORT: CPT | Performed by: INTERNAL MEDICINE

## 2020-11-02 PROCEDURE — 99285 EMERGENCY DEPT VISIT HI MDM: CPT | Performed by: EMERGENCY MEDICINE

## 2020-11-02 RX ORDER — PREDNISONE 1 MG/1
5 TABLET ORAL DAILY
Status: DISCONTINUED | OUTPATIENT
Start: 2020-11-03 | End: 2020-11-09 | Stop reason: HOSPADM

## 2020-11-02 RX ORDER — PREDNISONE 1 MG/1
5 TABLET ORAL DAILY
COMMUNITY

## 2020-11-02 RX ORDER — SODIUM CHLORIDE, SODIUM GLUCONATE, SODIUM ACETATE, POTASSIUM CHLORIDE, MAGNESIUM CHLORIDE, SODIUM PHOSPHATE, DIBASIC, AND POTASSIUM PHOSPHATE .53; .5; .37; .037; .03; .012; .00082 G/100ML; G/100ML; G/100ML; G/100ML; G/100ML; G/100ML; G/100ML
100 INJECTION, SOLUTION INTRAVENOUS CONTINUOUS
Status: DISCONTINUED | OUTPATIENT
Start: 2020-11-02 | End: 2020-11-04

## 2020-11-02 RX ORDER — ACETAMINOPHEN 325 MG/1
650 TABLET ORAL EVERY 6 HOURS PRN
Status: DISCONTINUED | OUTPATIENT
Start: 2020-11-02 | End: 2020-11-09 | Stop reason: HOSPADM

## 2020-11-02 RX ORDER — HEPARIN SODIUM 5000 [USP'U]/ML
5000 INJECTION, SOLUTION INTRAVENOUS; SUBCUTANEOUS EVERY 8 HOURS SCHEDULED
Status: DISCONTINUED | OUTPATIENT
Start: 2020-11-02 | End: 2020-11-04

## 2020-11-02 RX ORDER — SODIUM CHLORIDE, SODIUM GLUCONATE, SODIUM ACETATE, POTASSIUM CHLORIDE, MAGNESIUM CHLORIDE, SODIUM PHOSPHATE, DIBASIC, AND POTASSIUM PHOSPHATE .53; .5; .37; .037; .03; .012; .00082 G/100ML; G/100ML; G/100ML; G/100ML; G/100ML; G/100ML; G/100ML
1000 INJECTION, SOLUTION INTRAVENOUS ONCE
Status: COMPLETED | OUTPATIENT
Start: 2020-11-02 | End: 2020-11-02

## 2020-11-02 RX ORDER — POLYETHYLENE GLYCOL 3350 17 G/17G
17 POWDER, FOR SOLUTION ORAL DAILY PRN
Status: DISCONTINUED | OUTPATIENT
Start: 2020-11-02 | End: 2020-11-09 | Stop reason: HOSPADM

## 2020-11-02 RX ADMIN — INFLUENZA A VIRUS A/MICHIGAN/45/2015 X-275 (H1N1) ANTIGEN (FORMALDEHYDE INACTIVATED), INFLUENZA A VIRUS A/SINGAPORE/INFIMH-16-0019/2016 IVR-186 (H3N2) ANTIGEN (FORMALDEHYDE INACTIVATED), INFLUENZA B VIRUS B/PHUKET/3073/2013 ANTIGEN (FORMALDEHYDE INACTIVATED), AND INFLUENZA B VIRUS B/MARYLAND/15/2016 BX-69A ANTIGEN (FORMALDEHYDE INACTIVATED) 0.7 ML: 60; 60; 60; 60 INJECTION, SUSPENSION INTRAMUSCULAR at 22:54

## 2020-11-02 RX ADMIN — INSULIN LISPRO 1 UNITS: 100 INJECTION, SOLUTION INTRAVENOUS; SUBCUTANEOUS at 22:53

## 2020-11-02 RX ADMIN — SODIUM CHLORIDE, SODIUM GLUCONATE, SODIUM ACETATE, POTASSIUM CHLORIDE, MAGNESIUM CHLORIDE, SODIUM PHOSPHATE, DIBASIC, AND POTASSIUM PHOSPHATE 100 ML/HR: .53; .5; .37; .037; .03; .012; .00082 INJECTION, SOLUTION INTRAVENOUS at 18:16

## 2020-11-02 RX ADMIN — SODIUM CHLORIDE, SODIUM GLUCONATE, SODIUM ACETATE, POTASSIUM CHLORIDE, MAGNESIUM CHLORIDE, SODIUM PHOSPHATE, DIBASIC, AND POTASSIUM PHOSPHATE 1000 ML: .53; .5; .37; .037; .03; .012; .00082 INJECTION, SOLUTION INTRAVENOUS at 13:01

## 2020-11-02 RX ADMIN — HEPARIN SODIUM 5000 UNITS: 5000 INJECTION INTRAVENOUS; SUBCUTANEOUS at 17:36

## 2020-11-03 ENCOUNTER — APPOINTMENT (INPATIENT)
Dept: NON INVASIVE DIAGNOSTICS | Facility: HOSPITAL | Age: 80
DRG: 641 | End: 2020-11-03
Payer: MEDICARE

## 2020-11-03 PROBLEM — R62.7 FAILURE TO THRIVE IN ADULT: Status: ACTIVE | Noted: 2020-11-02

## 2020-11-03 LAB
ALBUMIN SERPL BCP-MCNC: 2.2 G/DL (ref 3.5–5)
ALP SERPL-CCNC: 52 U/L (ref 46–116)
ALT SERPL W P-5'-P-CCNC: 12 U/L (ref 12–78)
ANION GAP SERPL CALCULATED.3IONS-SCNC: 5 MMOL/L (ref 4–13)
AST SERPL W P-5'-P-CCNC: 15 U/L (ref 5–45)
BILIRUB SERPL-MCNC: 0.4 MG/DL (ref 0.2–1)
BUN SERPL-MCNC: 22 MG/DL (ref 5–25)
CALCIUM ALBUM COR SERPL-MCNC: 9.5 MG/DL (ref 8.3–10.1)
CALCIUM SERPL-MCNC: 8.1 MG/DL (ref 8.3–10.1)
CHLORIDE SERPL-SCNC: 102 MMOL/L (ref 100–108)
CO2 SERPL-SCNC: 32 MMOL/L (ref 21–32)
CREAT SERPL-MCNC: 1.29 MG/DL (ref 0.6–1.3)
GFR SERPL CREATININE-BSD FRML MDRD: 52 ML/MIN/1.73SQ M
GLUCOSE SERPL-MCNC: 107 MG/DL (ref 65–140)
GLUCOSE SERPL-MCNC: 108 MG/DL (ref 65–140)
GLUCOSE SERPL-MCNC: 147 MG/DL (ref 65–140)
GLUCOSE SERPL-MCNC: 176 MG/DL (ref 65–140)
GLUCOSE SERPL-MCNC: 177 MG/DL (ref 65–140)
MAGNESIUM SERPL-MCNC: 1.8 MG/DL (ref 1.6–2.6)
PHOSPHATE SERPL-MCNC: 2.2 MG/DL (ref 2.3–4.1)
POTASSIUM SERPL-SCNC: 3.9 MMOL/L (ref 3.5–5.3)
PROT SERPL-MCNC: 6.4 G/DL (ref 6.4–8.2)
SODIUM SERPL-SCNC: 139 MMOL/L (ref 136–145)
TSH SERPL DL<=0.05 MIU/L-ACNC: 2.07 UIU/ML (ref 0.36–3.74)
VIT B12 SERPL-MCNC: 487 PG/ML (ref 100–900)

## 2020-11-03 PROCEDURE — 84443 ASSAY THYROID STIM HORMONE: CPT | Performed by: INTERNAL MEDICINE

## 2020-11-03 PROCEDURE — 97163 PT EVAL HIGH COMPLEX 45 MIN: CPT

## 2020-11-03 PROCEDURE — 99232 SBSQ HOSP IP/OBS MODERATE 35: CPT | Performed by: PHYSICIAN ASSISTANT

## 2020-11-03 PROCEDURE — 83735 ASSAY OF MAGNESIUM: CPT | Performed by: INTERNAL MEDICINE

## 2020-11-03 PROCEDURE — 99222 1ST HOSP IP/OBS MODERATE 55: CPT | Performed by: INTERNAL MEDICINE

## 2020-11-03 PROCEDURE — 84100 ASSAY OF PHOSPHORUS: CPT | Performed by: INTERNAL MEDICINE

## 2020-11-03 PROCEDURE — 80053 COMPREHEN METABOLIC PANEL: CPT | Performed by: INTERNAL MEDICINE

## 2020-11-03 PROCEDURE — 93306 TTE W/DOPPLER COMPLETE: CPT | Performed by: INTERNAL MEDICINE

## 2020-11-03 PROCEDURE — 82948 REAGENT STRIP/BLOOD GLUCOSE: CPT

## 2020-11-03 PROCEDURE — 82607 VITAMIN B-12: CPT | Performed by: INTERNAL MEDICINE

## 2020-11-03 PROCEDURE — 93306 TTE W/DOPPLER COMPLETE: CPT

## 2020-11-03 PROCEDURE — 99223 1ST HOSP IP/OBS HIGH 75: CPT | Performed by: SURGERY

## 2020-11-03 PROCEDURE — 97167 OT EVAL HIGH COMPLEX 60 MIN: CPT

## 2020-11-03 RX ORDER — MEGESTROL ACETATE 40 MG/ML
400 SUSPENSION ORAL DAILY
Status: DISCONTINUED | OUTPATIENT
Start: 2020-11-03 | End: 2020-11-09 | Stop reason: HOSPADM

## 2020-11-03 RX ORDER — DOCUSATE SODIUM 100 MG/1
100 CAPSULE, LIQUID FILLED ORAL 2 TIMES DAILY
Status: DISCONTINUED | OUTPATIENT
Start: 2020-11-03 | End: 2020-11-03

## 2020-11-03 RX ADMIN — DOCUSATE SODIUM 100 MG: 100 CAPSULE ORAL at 09:59

## 2020-11-03 RX ADMIN — INSULIN LISPRO 1 UNITS: 100 INJECTION, SOLUTION INTRAVENOUS; SUBCUTANEOUS at 12:00

## 2020-11-03 RX ADMIN — HEPARIN SODIUM 5000 UNITS: 5000 INJECTION INTRAVENOUS; SUBCUTANEOUS at 22:29

## 2020-11-03 RX ADMIN — PREDNISONE 5 MG: 5 TABLET ORAL at 09:59

## 2020-11-03 RX ADMIN — SODIUM CHLORIDE, SODIUM GLUCONATE, SODIUM ACETATE, POTASSIUM CHLORIDE, MAGNESIUM CHLORIDE, SODIUM PHOSPHATE, DIBASIC, AND POTASSIUM PHOSPHATE 100 ML/HR: .53; .5; .37; .037; .03; .012; .00082 INJECTION, SOLUTION INTRAVENOUS at 05:31

## 2020-11-03 RX ADMIN — SODIUM CHLORIDE, SODIUM GLUCONATE, SODIUM ACETATE, POTASSIUM CHLORIDE, MAGNESIUM CHLORIDE, SODIUM PHOSPHATE, DIBASIC, AND POTASSIUM PHOSPHATE 100 ML/HR: .53; .5; .37; .037; .03; .012; .00082 INJECTION, SOLUTION INTRAVENOUS at 14:17

## 2020-11-03 RX ADMIN — INSULIN LISPRO 1 UNITS: 100 INJECTION, SOLUTION INTRAVENOUS; SUBCUTANEOUS at 17:28

## 2020-11-03 RX ADMIN — HEPARIN SODIUM 5000 UNITS: 5000 INJECTION INTRAVENOUS; SUBCUTANEOUS at 14:10

## 2020-11-03 RX ADMIN — HEPARIN SODIUM 5000 UNITS: 5000 INJECTION INTRAVENOUS; SUBCUTANEOUS at 05:31

## 2020-11-03 RX ADMIN — MEGESTROL ACETATE 400 MG: 40 SUSPENSION ORAL at 14:12

## 2020-11-04 PROBLEM — I48.0 PAF (PAROXYSMAL ATRIAL FIBRILLATION) (HCC): Status: ACTIVE | Noted: 2020-11-04

## 2020-11-04 LAB
ALBUMIN SERPL BCP-MCNC: 2.2 G/DL (ref 3.5–5)
ALP SERPL-CCNC: 50 U/L (ref 46–116)
ALT SERPL W P-5'-P-CCNC: 12 U/L (ref 12–78)
ANION GAP SERPL CALCULATED.3IONS-SCNC: 4 MMOL/L (ref 4–13)
AST SERPL W P-5'-P-CCNC: 14 U/L (ref 5–45)
ATRIAL RATE: 70 BPM
ATRIAL RATE: 71 BPM
BILIRUB SERPL-MCNC: 0.42 MG/DL (ref 0.2–1)
BUN SERPL-MCNC: 17 MG/DL (ref 5–25)
CALCIUM ALBUM COR SERPL-MCNC: 9.3 MG/DL (ref 8.3–10.1)
CALCIUM SERPL-MCNC: 7.9 MG/DL (ref 8.3–10.1)
CHLORIDE SERPL-SCNC: 106 MMOL/L (ref 100–108)
CO2 SERPL-SCNC: 32 MMOL/L (ref 21–32)
CREAT SERPL-MCNC: 1.02 MG/DL (ref 0.6–1.3)
ERYTHROCYTE [DISTWIDTH] IN BLOOD BY AUTOMATED COUNT: 14.2 % (ref 11.6–15.1)
GFR SERPL CREATININE-BSD FRML MDRD: 69 ML/MIN/1.73SQ M
GLUCOSE SERPL-MCNC: 102 MG/DL (ref 65–140)
GLUCOSE SERPL-MCNC: 118 MG/DL (ref 65–140)
GLUCOSE SERPL-MCNC: 136 MG/DL (ref 65–140)
GLUCOSE SERPL-MCNC: 150 MG/DL (ref 65–140)
GLUCOSE SERPL-MCNC: 164 MG/DL (ref 65–140)
HCT VFR BLD AUTO: 33.1 % (ref 36.5–49.3)
HGB BLD-MCNC: 10.7 G/DL (ref 12–17)
MCH RBC QN AUTO: 31.5 PG (ref 26.8–34.3)
MCHC RBC AUTO-ENTMCNC: 32.3 G/DL (ref 31.4–37.4)
MCV RBC AUTO: 97 FL (ref 82–98)
P AXIS: 83 DEGREES
P AXIS: 85 DEGREES
PLATELET # BLD AUTO: 116 THOUSANDS/UL (ref 149–390)
PMV BLD AUTO: 11 FL (ref 8.9–12.7)
POTASSIUM SERPL-SCNC: 3.7 MMOL/L (ref 3.5–5.3)
PR INTERVAL: 264 MS
PR INTERVAL: 272 MS
PREALB SERPL-MCNC: 8.2 MG/DL (ref 18–40)
PROT SERPL-MCNC: 6.4 G/DL (ref 6.4–8.2)
QRS AXIS: -5 DEGREES
QRS AXIS: -6 DEGREES
QRSD INTERVAL: 98 MS
QRSD INTERVAL: 98 MS
QT INTERVAL: 384 MS
QT INTERVAL: 388 MS
QTC INTERVAL: 417 MS
QTC INTERVAL: 419 MS
RBC # BLD AUTO: 3.4 MILLION/UL (ref 3.88–5.62)
SODIUM SERPL-SCNC: 142 MMOL/L (ref 136–145)
T WAVE AXIS: 28 DEGREES
T WAVE AXIS: 36 DEGREES
VENTRICULAR RATE: 70 BPM
VENTRICULAR RATE: 71 BPM
WBC # BLD AUTO: 6.61 THOUSAND/UL (ref 4.31–10.16)

## 2020-11-04 PROCEDURE — 93005 ELECTROCARDIOGRAM TRACING: CPT

## 2020-11-04 PROCEDURE — 84134 ASSAY OF PREALBUMIN: CPT | Performed by: PHYSICIAN ASSISTANT

## 2020-11-04 PROCEDURE — 80053 COMPREHEN METABOLIC PANEL: CPT | Performed by: PHYSICIAN ASSISTANT

## 2020-11-04 PROCEDURE — 99232 SBSQ HOSP IP/OBS MODERATE 35: CPT | Performed by: SURGERY

## 2020-11-04 PROCEDURE — 97116 GAIT TRAINING THERAPY: CPT

## 2020-11-04 PROCEDURE — 99221 1ST HOSP IP/OBS SF/LOW 40: CPT | Performed by: INTERNAL MEDICINE

## 2020-11-04 PROCEDURE — 93010 ELECTROCARDIOGRAM REPORT: CPT | Performed by: INTERNAL MEDICINE

## 2020-11-04 PROCEDURE — 85027 COMPLETE CBC AUTOMATED: CPT | Performed by: PHYSICIAN ASSISTANT

## 2020-11-04 PROCEDURE — 99232 SBSQ HOSP IP/OBS MODERATE 35: CPT | Performed by: PHYSICIAN ASSISTANT

## 2020-11-04 PROCEDURE — 97530 THERAPEUTIC ACTIVITIES: CPT

## 2020-11-04 PROCEDURE — 82948 REAGENT STRIP/BLOOD GLUCOSE: CPT

## 2020-11-04 RX ORDER — POTASSIUM CHLORIDE 20 MEQ/1
40 TABLET, EXTENDED RELEASE ORAL ONCE
Status: COMPLETED | OUTPATIENT
Start: 2020-11-04 | End: 2020-11-04

## 2020-11-04 RX ORDER — ONDANSETRON 2 MG/ML
4 INJECTION INTRAMUSCULAR; INTRAVENOUS EVERY 6 HOURS PRN
Status: DISCONTINUED | OUTPATIENT
Start: 2020-11-04 | End: 2020-11-09 | Stop reason: HOSPADM

## 2020-11-04 RX ORDER — CALCIUM CARBONATE 200(500)MG
500 TABLET,CHEWABLE ORAL DAILY PRN
Status: DISCONTINUED | OUTPATIENT
Start: 2020-11-04 | End: 2020-11-09 | Stop reason: HOSPADM

## 2020-11-04 RX ORDER — MAGNESIUM SULFATE HEPTAHYDRATE 40 MG/ML
2 INJECTION, SOLUTION INTRAVENOUS ONCE
Status: COMPLETED | OUTPATIENT
Start: 2020-11-04 | End: 2020-11-04

## 2020-11-04 RX ORDER — SODIUM CHLORIDE, SODIUM GLUCONATE, SODIUM ACETATE, POTASSIUM CHLORIDE, MAGNESIUM CHLORIDE, SODIUM PHOSPHATE, DIBASIC, AND POTASSIUM PHOSPHATE .53; .5; .37; .037; .03; .012; .00082 G/100ML; G/100ML; G/100ML; G/100ML; G/100ML; G/100ML; G/100ML
50 INJECTION, SOLUTION INTRAVENOUS CONTINUOUS
Status: DISPENSED | OUTPATIENT
Start: 2020-11-04 | End: 2020-11-05

## 2020-11-04 RX ADMIN — MAGNESIUM SULFATE HEPTAHYDRATE 2 G: 40 INJECTION, SOLUTION INTRAVENOUS at 15:56

## 2020-11-04 RX ADMIN — APIXABAN 2.5 MG: 2.5 TABLET, FILM COATED ORAL at 18:22

## 2020-11-04 RX ADMIN — POTASSIUM CHLORIDE 40 MEQ: 1500 TABLET, EXTENDED RELEASE ORAL at 15:48

## 2020-11-04 RX ADMIN — HEPARIN SODIUM 5000 UNITS: 5000 INJECTION INTRAVENOUS; SUBCUTANEOUS at 15:48

## 2020-11-04 RX ADMIN — INSULIN LISPRO 1 UNITS: 100 INJECTION, SOLUTION INTRAVENOUS; SUBCUTANEOUS at 16:24

## 2020-11-04 RX ADMIN — HEPARIN SODIUM 5000 UNITS: 5000 INJECTION INTRAVENOUS; SUBCUTANEOUS at 05:33

## 2020-11-04 RX ADMIN — SODIUM CHLORIDE, SODIUM GLUCONATE, SODIUM ACETATE, POTASSIUM CHLORIDE, MAGNESIUM CHLORIDE, SODIUM PHOSPHATE, DIBASIC, AND POTASSIUM PHOSPHATE 100 ML/HR: .53; .5; .37; .037; .03; .012; .00082 INJECTION, SOLUTION INTRAVENOUS at 00:24

## 2020-11-04 RX ADMIN — SODIUM CHLORIDE, SODIUM GLUCONATE, SODIUM ACETATE, POTASSIUM CHLORIDE, MAGNESIUM CHLORIDE, SODIUM PHOSPHATE, DIBASIC, AND POTASSIUM PHOSPHATE 100 ML/HR: .53; .5; .37; .037; .03; .012; .00082 INJECTION, SOLUTION INTRAVENOUS at 10:46

## 2020-11-04 RX ADMIN — PREDNISONE 5 MG: 5 TABLET ORAL at 10:49

## 2020-11-04 RX ADMIN — MEGESTROL ACETATE 400 MG: 40 SUSPENSION ORAL at 10:48

## 2020-11-04 RX ADMIN — INSULIN LISPRO 1 UNITS: 100 INJECTION, SOLUTION INTRAVENOUS; SUBCUTANEOUS at 22:52

## 2020-11-05 PROBLEM — E43 SEVERE PROTEIN-CALORIE MALNUTRITION (HCC): Status: ACTIVE | Noted: 2020-11-05

## 2020-11-05 LAB
ANION GAP SERPL CALCULATED.3IONS-SCNC: 1 MMOL/L (ref 4–13)
BUN SERPL-MCNC: 17 MG/DL (ref 5–25)
CALCIUM SERPL-MCNC: 8.5 MG/DL (ref 8.3–10.1)
CHLORIDE SERPL-SCNC: 107 MMOL/L (ref 100–108)
CHOLEST SERPL-MCNC: 108 MG/DL (ref 50–200)
CO2 SERPL-SCNC: 32 MMOL/L (ref 21–32)
CREAT SERPL-MCNC: 1.07 MG/DL (ref 0.6–1.3)
ERYTHROCYTE [DISTWIDTH] IN BLOOD BY AUTOMATED COUNT: 14.2 % (ref 11.6–15.1)
GFR SERPL CREATININE-BSD FRML MDRD: 65 ML/MIN/1.73SQ M
GLUCOSE SERPL-MCNC: 140 MG/DL (ref 65–140)
GLUCOSE SERPL-MCNC: 158 MG/DL (ref 65–140)
GLUCOSE SERPL-MCNC: 181 MG/DL (ref 65–140)
GLUCOSE SERPL-MCNC: 81 MG/DL (ref 65–140)
GLUCOSE SERPL-MCNC: 84 MG/DL (ref 65–140)
HCT VFR BLD AUTO: 31.2 % (ref 36.5–49.3)
HDLC SERPL-MCNC: 45 MG/DL
HGB BLD-MCNC: 10.4 G/DL (ref 12–17)
LDLC SERPL CALC-MCNC: 54 MG/DL (ref 0–100)
MAGNESIUM SERPL-MCNC: 2.2 MG/DL (ref 1.6–2.6)
MCH RBC QN AUTO: 32.6 PG (ref 26.8–34.3)
MCHC RBC AUTO-ENTMCNC: 33.3 G/DL (ref 31.4–37.4)
MCV RBC AUTO: 98 FL (ref 82–98)
NONHDLC SERPL-MCNC: 63 MG/DL
PLATELET # BLD AUTO: 104 THOUSANDS/UL (ref 149–390)
PMV BLD AUTO: 11.3 FL (ref 8.9–12.7)
POTASSIUM SERPL-SCNC: 4 MMOL/L (ref 3.5–5.3)
PREALB SERPL-MCNC: 7.8 MG/DL (ref 18–40)
RBC # BLD AUTO: 3.19 MILLION/UL (ref 3.88–5.62)
SODIUM SERPL-SCNC: 140 MMOL/L (ref 136–145)
TRIGL SERPL-MCNC: 46 MG/DL
WBC # BLD AUTO: 5.53 THOUSAND/UL (ref 4.31–10.16)

## 2020-11-05 PROCEDURE — 99232 SBSQ HOSP IP/OBS MODERATE 35: CPT | Performed by: INTERNAL MEDICINE

## 2020-11-05 PROCEDURE — 97116 GAIT TRAINING THERAPY: CPT

## 2020-11-05 PROCEDURE — 80048 BASIC METABOLIC PNL TOTAL CA: CPT | Performed by: PHYSICIAN ASSISTANT

## 2020-11-05 PROCEDURE — 99232 SBSQ HOSP IP/OBS MODERATE 35: CPT | Performed by: SURGERY

## 2020-11-05 PROCEDURE — 82948 REAGENT STRIP/BLOOD GLUCOSE: CPT

## 2020-11-05 PROCEDURE — 99232 SBSQ HOSP IP/OBS MODERATE 35: CPT | Performed by: PHYSICIAN ASSISTANT

## 2020-11-05 PROCEDURE — 84134 ASSAY OF PREALBUMIN: CPT | Performed by: PHYSICIAN ASSISTANT

## 2020-11-05 PROCEDURE — 83735 ASSAY OF MAGNESIUM: CPT | Performed by: INTERNAL MEDICINE

## 2020-11-05 PROCEDURE — 80061 LIPID PANEL: CPT | Performed by: PHYSICIAN ASSISTANT

## 2020-11-05 PROCEDURE — 99232 SBSQ HOSP IP/OBS MODERATE 35: CPT | Performed by: STUDENT IN AN ORGANIZED HEALTH CARE EDUCATION/TRAINING PROGRAM

## 2020-11-05 PROCEDURE — 85027 COMPLETE CBC AUTOMATED: CPT | Performed by: PHYSICIAN ASSISTANT

## 2020-11-05 RX ADMIN — INSULIN LISPRO 1 UNITS: 100 INJECTION, SOLUTION INTRAVENOUS; SUBCUTANEOUS at 21:30

## 2020-11-05 RX ADMIN — APIXABAN 2.5 MG: 2.5 TABLET, FILM COATED ORAL at 17:00

## 2020-11-05 RX ADMIN — MEGESTROL ACETATE 400 MG: 40 SUSPENSION ORAL at 11:30

## 2020-11-05 RX ADMIN — PREDNISONE 5 MG: 5 TABLET ORAL at 09:56

## 2020-11-05 RX ADMIN — INSULIN LISPRO 1 UNITS: 100 INJECTION, SOLUTION INTRAVENOUS; SUBCUTANEOUS at 11:30

## 2020-11-05 RX ADMIN — APIXABAN 2.5 MG: 2.5 TABLET, FILM COATED ORAL at 09:56

## 2020-11-06 LAB
GLUCOSE SERPL-MCNC: 106 MG/DL (ref 65–140)
GLUCOSE SERPL-MCNC: 164 MG/DL (ref 65–140)
GLUCOSE SERPL-MCNC: 292 MG/DL (ref 65–140)
GLUCOSE SERPL-MCNC: 89 MG/DL (ref 65–140)

## 2020-11-06 PROCEDURE — 83735 ASSAY OF MAGNESIUM: CPT | Performed by: PHYSICIAN ASSISTANT

## 2020-11-06 PROCEDURE — 82948 REAGENT STRIP/BLOOD GLUCOSE: CPT

## 2020-11-06 PROCEDURE — 99232 SBSQ HOSP IP/OBS MODERATE 35: CPT | Performed by: PHYSICIAN ASSISTANT

## 2020-11-06 PROCEDURE — 80048 BASIC METABOLIC PNL TOTAL CA: CPT | Performed by: PHYSICIAN ASSISTANT

## 2020-11-06 PROCEDURE — 99232 SBSQ HOSP IP/OBS MODERATE 35: CPT | Performed by: SURGERY

## 2020-11-06 PROCEDURE — 93005 ELECTROCARDIOGRAM TRACING: CPT

## 2020-11-06 PROCEDURE — 99232 SBSQ HOSP IP/OBS MODERATE 35: CPT | Performed by: INTERNAL MEDICINE

## 2020-11-06 RX ADMIN — APIXABAN 2.5 MG: 2.5 TABLET, FILM COATED ORAL at 17:04

## 2020-11-06 RX ADMIN — MEGESTROL ACETATE 400 MG: 40 SUSPENSION ORAL at 09:13

## 2020-11-06 RX ADMIN — PREDNISONE 5 MG: 5 TABLET ORAL at 09:13

## 2020-11-06 RX ADMIN — ONDANSETRON 4 MG: 2 INJECTION INTRAMUSCULAR; INTRAVENOUS at 12:01

## 2020-11-06 RX ADMIN — INSULIN LISPRO 2 UNITS: 100 INJECTION, SOLUTION INTRAVENOUS; SUBCUTANEOUS at 21:25

## 2020-11-06 RX ADMIN — INSULIN LISPRO 1 UNITS: 100 INJECTION, SOLUTION INTRAVENOUS; SUBCUTANEOUS at 17:04

## 2020-11-06 RX ADMIN — APIXABAN 2.5 MG: 2.5 TABLET, FILM COATED ORAL at 09:13

## 2020-11-07 LAB
ANION GAP SERPL CALCULATED.3IONS-SCNC: 0 MMOL/L (ref 4–13)
ATRIAL RATE: 71 BPM
BUN SERPL-MCNC: 28 MG/DL (ref 5–25)
CALCIUM SERPL-MCNC: 9 MG/DL (ref 8.3–10.1)
CHLORIDE SERPL-SCNC: 106 MMOL/L (ref 100–108)
CO2 SERPL-SCNC: 32 MMOL/L (ref 21–32)
CREAT SERPL-MCNC: 1.2 MG/DL (ref 0.6–1.3)
GFR SERPL CREATININE-BSD FRML MDRD: 56 ML/MIN/1.73SQ M
GLUCOSE SERPL-MCNC: 115 MG/DL (ref 65–140)
GLUCOSE SERPL-MCNC: 117 MG/DL (ref 65–140)
GLUCOSE SERPL-MCNC: 180 MG/DL (ref 65–140)
GLUCOSE SERPL-MCNC: 192 MG/DL (ref 65–140)
GLUCOSE SERPL-MCNC: 305 MG/DL (ref 65–140)
MAGNESIUM SERPL-MCNC: 1.6 MG/DL (ref 1.6–2.6)
P AXIS: 80 DEGREES
POTASSIUM SERPL-SCNC: 4.6 MMOL/L (ref 3.5–5.3)
PR INTERVAL: 244 MS
QRS AXIS: -7 DEGREES
QRSD INTERVAL: 100 MS
QT INTERVAL: 368 MS
QTC INTERVAL: 424 MS
SODIUM SERPL-SCNC: 138 MMOL/L (ref 136–145)
T WAVE AXIS: 29 DEGREES
VENTRICULAR RATE: 80 BPM

## 2020-11-07 PROCEDURE — 82948 REAGENT STRIP/BLOOD GLUCOSE: CPT

## 2020-11-07 PROCEDURE — 93010 ELECTROCARDIOGRAM REPORT: CPT | Performed by: INTERNAL MEDICINE

## 2020-11-07 PROCEDURE — 99233 SBSQ HOSP IP/OBS HIGH 50: CPT | Performed by: INTERNAL MEDICINE

## 2020-11-07 PROCEDURE — 99232 SBSQ HOSP IP/OBS MODERATE 35: CPT | Performed by: INTERNAL MEDICINE

## 2020-11-07 RX ORDER — INSULIN GLARGINE 100 [IU]/ML
5 INJECTION, SOLUTION SUBCUTANEOUS
Status: DISCONTINUED | OUTPATIENT
Start: 2020-11-07 | End: 2020-11-09 | Stop reason: HOSPADM

## 2020-11-07 RX ADMIN — MAGNESIUM OXIDE TAB 400 MG (241.3 MG ELEMENTAL MG) 800 MG: 400 (241.3 MG) TAB at 00:49

## 2020-11-07 RX ADMIN — PREDNISONE 5 MG: 5 TABLET ORAL at 09:07

## 2020-11-07 RX ADMIN — APIXABAN 2.5 MG: 2.5 TABLET, FILM COATED ORAL at 09:07

## 2020-11-07 RX ADMIN — INSULIN LISPRO 3 UNITS: 100 INJECTION, SOLUTION INTRAVENOUS; SUBCUTANEOUS at 11:42

## 2020-11-07 RX ADMIN — MEGESTROL ACETATE 400 MG: 40 SUSPENSION ORAL at 09:08

## 2020-11-07 RX ADMIN — INSULIN LISPRO 1 UNITS: 100 INJECTION, SOLUTION INTRAVENOUS; SUBCUTANEOUS at 22:02

## 2020-11-07 RX ADMIN — APIXABAN 2.5 MG: 2.5 TABLET, FILM COATED ORAL at 17:15

## 2020-11-07 RX ADMIN — INSULIN LISPRO 1 UNITS: 100 INJECTION, SOLUTION INTRAVENOUS; SUBCUTANEOUS at 17:15

## 2020-11-07 RX ADMIN — INSULIN GLARGINE 5 UNITS: 100 INJECTION, SOLUTION SUBCUTANEOUS at 22:02

## 2020-11-08 PROBLEM — I47.2 NON-SUSTAINED VENTRICULAR TACHYCARDIA (HCC): Status: ACTIVE | Noted: 2020-11-08

## 2020-11-08 LAB
ANION GAP SERPL CALCULATED.3IONS-SCNC: 3 MMOL/L (ref 4–13)
BUN SERPL-MCNC: 23 MG/DL (ref 5–25)
CALCIUM SERPL-MCNC: 8.7 MG/DL (ref 8.3–10.1)
CHLORIDE SERPL-SCNC: 106 MMOL/L (ref 100–108)
CO2 SERPL-SCNC: 31 MMOL/L (ref 21–32)
CREAT SERPL-MCNC: 1.15 MG/DL (ref 0.6–1.3)
EST. AVERAGE GLUCOSE BLD GHB EST-MCNC: 160 MG/DL
GFR SERPL CREATININE-BSD FRML MDRD: 59 ML/MIN/1.73SQ M
GLUCOSE SERPL-MCNC: 155 MG/DL (ref 65–140)
GLUCOSE SERPL-MCNC: 209 MG/DL (ref 65–140)
GLUCOSE SERPL-MCNC: 248 MG/DL (ref 65–140)
GLUCOSE SERPL-MCNC: 81 MG/DL (ref 65–140)
GLUCOSE SERPL-MCNC: 90 MG/DL (ref 65–140)
HBA1C MFR BLD: 7.2 %
MAGNESIUM SERPL-MCNC: 1.5 MG/DL (ref 1.6–2.6)
POTASSIUM SERPL-SCNC: 3.8 MMOL/L (ref 3.5–5.3)
SODIUM SERPL-SCNC: 140 MMOL/L (ref 136–145)

## 2020-11-08 PROCEDURE — 99233 SBSQ HOSP IP/OBS HIGH 50: CPT | Performed by: INTERNAL MEDICINE

## 2020-11-08 PROCEDURE — 82948 REAGENT STRIP/BLOOD GLUCOSE: CPT

## 2020-11-08 PROCEDURE — 99232 SBSQ HOSP IP/OBS MODERATE 35: CPT | Performed by: INTERNAL MEDICINE

## 2020-11-08 PROCEDURE — 83036 HEMOGLOBIN GLYCOSYLATED A1C: CPT | Performed by: INTERNAL MEDICINE

## 2020-11-08 PROCEDURE — 83735 ASSAY OF MAGNESIUM: CPT | Performed by: PHYSICIAN ASSISTANT

## 2020-11-08 PROCEDURE — 80048 BASIC METABOLIC PNL TOTAL CA: CPT | Performed by: INTERNAL MEDICINE

## 2020-11-08 RX ORDER — MAGNESIUM SULFATE HEPTAHYDRATE 40 MG/ML
2 INJECTION, SOLUTION INTRAVENOUS ONCE
Status: COMPLETED | OUTPATIENT
Start: 2020-11-08 | End: 2020-11-08

## 2020-11-08 RX ORDER — POTASSIUM CHLORIDE 20 MEQ/1
20 TABLET, EXTENDED RELEASE ORAL ONCE
Status: COMPLETED | OUTPATIENT
Start: 2020-11-08 | End: 2020-11-08

## 2020-11-08 RX ADMIN — APIXABAN 2.5 MG: 2.5 TABLET, FILM COATED ORAL at 08:51

## 2020-11-08 RX ADMIN — INSULIN LISPRO 1 UNITS: 100 INJECTION, SOLUTION INTRAVENOUS; SUBCUTANEOUS at 21:31

## 2020-11-08 RX ADMIN — INSULIN LISPRO 2 UNITS: 100 INJECTION, SOLUTION INTRAVENOUS; SUBCUTANEOUS at 17:19

## 2020-11-08 RX ADMIN — INSULIN LISPRO 1 UNITS: 100 INJECTION, SOLUTION INTRAVENOUS; SUBCUTANEOUS at 11:50

## 2020-11-08 RX ADMIN — MAGNESIUM SULFATE HEPTAHYDRATE 2 G: 40 INJECTION, SOLUTION INTRAVENOUS at 10:19

## 2020-11-08 RX ADMIN — PREDNISONE 5 MG: 5 TABLET ORAL at 08:51

## 2020-11-08 RX ADMIN — INSULIN GLARGINE 5 UNITS: 100 INJECTION, SOLUTION SUBCUTANEOUS at 21:30

## 2020-11-08 RX ADMIN — MEGESTROL ACETATE 400 MG: 40 SUSPENSION ORAL at 08:51

## 2020-11-08 RX ADMIN — POTASSIUM CHLORIDE 20 MEQ: 1500 TABLET, EXTENDED RELEASE ORAL at 08:51

## 2020-11-08 RX ADMIN — APIXABAN 2.5 MG: 2.5 TABLET, FILM COATED ORAL at 17:19

## 2020-11-08 RX ADMIN — SODIUM CHLORIDE 500 ML: 0.9 INJECTION, SOLUTION INTRAVENOUS at 08:56

## 2020-11-09 VITALS
WEIGHT: 117.95 LBS | RESPIRATION RATE: 18 BRPM | SYSTOLIC BLOOD PRESSURE: 92 MMHG | BODY MASS INDEX: 17.47 KG/M2 | OXYGEN SATURATION: 98 % | DIASTOLIC BLOOD PRESSURE: 55 MMHG | TEMPERATURE: 98.1 F | HEART RATE: 85 BPM | HEIGHT: 69 IN

## 2020-11-09 DIAGNOSIS — Z85.01 PERSONAL HISTORY OF MALIGNANT NEOPLASM OF ESOPHAGUS: Primary | ICD-10-CM

## 2020-11-09 PROBLEM — I47.2 NON-SUSTAINED VENTRICULAR TACHYCARDIA (HCC): Status: RESOLVED | Noted: 2020-11-08 | Resolved: 2020-11-09

## 2020-11-09 LAB
ANION GAP SERPL CALCULATED.3IONS-SCNC: 2 MMOL/L (ref 4–13)
BUN SERPL-MCNC: 24 MG/DL (ref 5–25)
CALCIUM SERPL-MCNC: 8.7 MG/DL (ref 8.3–10.1)
CHLORIDE SERPL-SCNC: 106 MMOL/L (ref 100–108)
CO2 SERPL-SCNC: 31 MMOL/L (ref 21–32)
CREAT SERPL-MCNC: 1.19 MG/DL (ref 0.6–1.3)
GFR SERPL CREATININE-BSD FRML MDRD: 57 ML/MIN/1.73SQ M
GLUCOSE SERPL-MCNC: 110 MG/DL (ref 65–140)
GLUCOSE SERPL-MCNC: 268 MG/DL (ref 65–140)
GLUCOSE SERPL-MCNC: 95 MG/DL (ref 65–140)
MAGNESIUM SERPL-MCNC: 1.8 MG/DL (ref 1.6–2.6)
POTASSIUM SERPL-SCNC: 4.3 MMOL/L (ref 3.5–5.3)
SARS-COV-2 RNA RESP QL NAA+PROBE: NEGATIVE
SODIUM SERPL-SCNC: 139 MMOL/L (ref 136–145)

## 2020-11-09 PROCEDURE — 97116 GAIT TRAINING THERAPY: CPT

## 2020-11-09 PROCEDURE — 99231 SBSQ HOSP IP/OBS SF/LOW 25: CPT | Performed by: INTERNAL MEDICINE

## 2020-11-09 PROCEDURE — 83735 ASSAY OF MAGNESIUM: CPT | Performed by: INTERNAL MEDICINE

## 2020-11-09 PROCEDURE — 99239 HOSP IP/OBS DSCHRG MGMT >30: CPT | Performed by: INTERNAL MEDICINE

## 2020-11-09 PROCEDURE — 99232 SBSQ HOSP IP/OBS MODERATE 35: CPT | Performed by: SURGERY

## 2020-11-09 PROCEDURE — 82948 REAGENT STRIP/BLOOD GLUCOSE: CPT

## 2020-11-09 PROCEDURE — 99232 SBSQ HOSP IP/OBS MODERATE 35: CPT | Performed by: INTERNAL MEDICINE

## 2020-11-09 PROCEDURE — U0003 INFECTIOUS AGENT DETECTION BY NUCLEIC ACID (DNA OR RNA); SEVERE ACUTE RESPIRATORY SYNDROME CORONAVIRUS 2 (SARS-COV-2) (CORONAVIRUS DISEASE [COVID-19]), AMPLIFIED PROBE TECHNIQUE, MAKING USE OF HIGH THROUGHPUT TECHNOLOGIES AS DESCRIBED BY CMS-2020-01-R: HCPCS | Performed by: INTERNAL MEDICINE

## 2020-11-09 PROCEDURE — 80048 BASIC METABOLIC PNL TOTAL CA: CPT | Performed by: INTERNAL MEDICINE

## 2020-11-09 PROCEDURE — 97530 THERAPEUTIC ACTIVITIES: CPT

## 2020-11-09 RX ORDER — MEGESTROL ACETATE 40 MG/ML
400 SUSPENSION ORAL DAILY
Qty: 300 ML | Refills: 2 | Status: SHIPPED | OUTPATIENT
Start: 2020-11-10 | End: 2020-11-18 | Stop reason: SDUPTHER

## 2020-11-09 RX ORDER — MEGESTROL ACETATE 40 MG/ML
400 SUSPENSION ORAL DAILY
Qty: 600 ML | Refills: 0 | Status: SHIPPED | OUTPATIENT
Start: 2020-11-10 | End: 2020-11-09

## 2020-11-09 RX ORDER — MEGESTROL ACETATE 40 MG/ML
400 SUSPENSION ORAL DAILY
Qty: 900 ML | Refills: 0 | Status: SHIPPED | OUTPATIENT
Start: 2020-11-10 | End: 2020-11-09

## 2020-11-09 RX ORDER — MAGNESIUM SULFATE 1 G/100ML
1 INJECTION INTRAVENOUS ONCE
Status: COMPLETED | OUTPATIENT
Start: 2020-11-09 | End: 2020-11-09

## 2020-11-09 RX ADMIN — INSULIN LISPRO 2 UNITS: 100 INJECTION, SOLUTION INTRAVENOUS; SUBCUTANEOUS at 11:32

## 2020-11-09 RX ADMIN — MAGNESIUM SULFATE HEPTAHYDRATE 1 G: 1 INJECTION, SOLUTION INTRAVENOUS at 05:04

## 2020-11-09 RX ADMIN — MEGESTROL ACETATE 400 MG: 40 SUSPENSION ORAL at 08:48

## 2020-11-09 RX ADMIN — APIXABAN 2.5 MG: 2.5 TABLET, FILM COATED ORAL at 08:48

## 2020-11-09 RX ADMIN — PREDNISONE 5 MG: 5 TABLET ORAL at 08:48

## 2020-11-10 ENCOUNTER — TRANSITIONAL CARE MANAGEMENT (OUTPATIENT)
Dept: FAMILY MEDICINE CLINIC | Facility: CLINIC | Age: 80
End: 2020-11-10

## 2020-11-12 ENCOUNTER — OFFICE VISIT (OUTPATIENT)
Dept: CARDIOLOGY CLINIC | Facility: CLINIC | Age: 80
End: 2020-11-12
Payer: MEDICARE

## 2020-11-12 ENCOUNTER — LAB (OUTPATIENT)
Dept: LAB | Facility: MEDICAL CENTER | Age: 80
End: 2020-11-12
Payer: MEDICARE

## 2020-11-12 ENCOUNTER — APPOINTMENT (OUTPATIENT)
Dept: LAB | Facility: MEDICAL CENTER | Age: 80
End: 2020-11-12
Payer: MEDICARE

## 2020-11-12 VITALS
OXYGEN SATURATION: 100 % | HEART RATE: 96 BPM | BODY MASS INDEX: 17.68 KG/M2 | TEMPERATURE: 98 F | DIASTOLIC BLOOD PRESSURE: 50 MMHG | HEIGHT: 69 IN | SYSTOLIC BLOOD PRESSURE: 90 MMHG | WEIGHT: 119.4 LBS

## 2020-11-12 DIAGNOSIS — I49.3 PVC (PREMATURE VENTRICULAR CONTRACTION): ICD-10-CM

## 2020-11-12 DIAGNOSIS — Z85.01 PERSONAL HISTORY OF MALIGNANT NEOPLASM OF ESOPHAGUS: ICD-10-CM

## 2020-11-12 DIAGNOSIS — I48.91 ATRIAL FIBRILLATION, UNSPECIFIED TYPE (HCC): Primary | ICD-10-CM

## 2020-11-12 LAB
ANION GAP SERPL CALCULATED.3IONS-SCNC: 2 MMOL/L (ref 4–13)
BUN SERPL-MCNC: 24 MG/DL (ref 5–25)
CALCIUM SERPL-MCNC: 8.5 MG/DL (ref 8.3–10.1)
CHLORIDE SERPL-SCNC: 106 MMOL/L (ref 100–108)
CO2 SERPL-SCNC: 31 MMOL/L (ref 21–32)
CREAT SERPL-MCNC: 1.46 MG/DL (ref 0.6–1.3)
GFR SERPL CREATININE-BSD FRML MDRD: 44 ML/MIN/1.73SQ M
GLUCOSE SERPL-MCNC: 172 MG/DL (ref 65–140)
MAGNESIUM SERPL-MCNC: 1.7 MG/DL (ref 1.6–2.6)
POTASSIUM SERPL-SCNC: 4.2 MMOL/L (ref 3.5–5.3)
PREALB SERPL-MCNC: 19.5 MG/DL (ref 18–40)
SODIUM SERPL-SCNC: 139 MMOL/L (ref 136–145)

## 2020-11-12 PROCEDURE — 99215 OFFICE O/P EST HI 40 MIN: CPT | Performed by: NURSE PRACTITIONER

## 2020-11-12 PROCEDURE — 93000 ELECTROCARDIOGRAM COMPLETE: CPT | Performed by: NURSE PRACTITIONER

## 2020-11-12 PROCEDURE — 83735 ASSAY OF MAGNESIUM: CPT | Performed by: NURSE PRACTITIONER

## 2020-11-12 PROCEDURE — 36415 COLL VENOUS BLD VENIPUNCTURE: CPT

## 2020-11-12 PROCEDURE — 84134 ASSAY OF PREALBUMIN: CPT

## 2020-11-12 PROCEDURE — 80048 BASIC METABOLIC PNL TOTAL CA: CPT | Performed by: NURSE PRACTITIONER

## 2020-11-13 DIAGNOSIS — Z85.01 PERSONAL HISTORY OF MALIGNANT NEOPLASM OF ESOPHAGUS: Primary | ICD-10-CM

## 2020-11-17 DIAGNOSIS — R62.7 FAILURE TO THRIVE IN ADULT: ICD-10-CM

## 2020-11-18 ENCOUNTER — OFFICE VISIT (OUTPATIENT)
Dept: FAMILY MEDICINE CLINIC | Facility: CLINIC | Age: 80
End: 2020-11-18
Payer: MEDICARE

## 2020-11-18 ENCOUNTER — TELEPHONE (OUTPATIENT)
Dept: FAMILY MEDICINE CLINIC | Facility: CLINIC | Age: 80
End: 2020-11-18

## 2020-11-18 VITALS
HEIGHT: 69 IN | SYSTOLIC BLOOD PRESSURE: 116 MMHG | TEMPERATURE: 97.5 F | HEART RATE: 92 BPM | DIASTOLIC BLOOD PRESSURE: 62 MMHG | WEIGHT: 116.8 LBS | BODY MASS INDEX: 17.3 KG/M2 | OXYGEN SATURATION: 98 %

## 2020-11-18 DIAGNOSIS — I48.91 ATRIAL FIBRILLATION, UNSPECIFIED TYPE (HCC): ICD-10-CM

## 2020-11-18 DIAGNOSIS — R62.7 FAILURE TO THRIVE IN ADULT: ICD-10-CM

## 2020-11-18 DIAGNOSIS — E83.42 HYPOMAGNESEMIA: ICD-10-CM

## 2020-11-18 DIAGNOSIS — R63.0 LOSS OF APPETITE: ICD-10-CM

## 2020-11-18 DIAGNOSIS — Z76.89 ENCOUNTER FOR SUPPORT AND COORDINATION OF TRANSITION OF CARE: Primary | ICD-10-CM

## 2020-11-18 PROCEDURE — 99495 TRANSJ CARE MGMT MOD F2F 14D: CPT | Performed by: FAMILY MEDICINE

## 2020-11-18 RX ORDER — MEGESTROL ACETATE 40 MG/ML
400 SUSPENSION ORAL DAILY
Qty: 300 ML | Refills: 2 | Status: SHIPPED | OUTPATIENT
Start: 2020-11-18 | End: 2020-11-18 | Stop reason: SDUPTHER

## 2020-11-19 ENCOUNTER — TRANSCRIBE ORDERS (OUTPATIENT)
Dept: ADMINISTRATIVE | Facility: HOSPITAL | Age: 80
End: 2020-11-19

## 2020-11-19 ENCOUNTER — LAB (OUTPATIENT)
Dept: LAB | Facility: MEDICAL CENTER | Age: 80
End: 2020-11-19
Payer: MEDICARE

## 2020-11-19 DIAGNOSIS — R63.4 LOSS OF WEIGHT: ICD-10-CM

## 2020-11-19 DIAGNOSIS — R63.4 LOSS OF WEIGHT: Primary | ICD-10-CM

## 2020-11-19 DIAGNOSIS — D12.6 BENIGN NEOPLASM OF COLON, UNSPECIFIED PART OF COLON: ICD-10-CM

## 2020-11-19 DIAGNOSIS — Z85.01 PERSONAL HISTORY OF MALIGNANT NEOPLASM OF ESOPHAGUS: ICD-10-CM

## 2020-11-19 LAB
CORTIS AM PEAK SERPL-MCNC: 15.3 UG/DL (ref 4.2–22.4)
PREALB SERPL-MCNC: 18.7 MG/DL (ref 18–40)

## 2020-11-19 PROCEDURE — 82024 ASSAY OF ACTH: CPT

## 2020-11-19 PROCEDURE — 82533 TOTAL CORTISOL: CPT

## 2020-11-19 PROCEDURE — 84134 ASSAY OF PREALBUMIN: CPT

## 2020-11-19 PROCEDURE — 36415 COLL VENOUS BLD VENIPUNCTURE: CPT

## 2020-11-20 DIAGNOSIS — Z85.01 PERSONAL HISTORY OF MALIGNANT NEOPLASM OF ESOPHAGUS: ICD-10-CM

## 2020-11-20 DIAGNOSIS — C78.1 SECONDARY MALIGNANT NEOPLASM OF MEDIASTINUM (HCC): Primary | ICD-10-CM

## 2020-11-20 LAB — ACTH PLAS-MCNC: 35.5 PG/ML (ref 7.2–63.3)

## 2020-11-20 RX ORDER — MEGESTROL ACETATE 40 MG/ML
400 SUSPENSION ORAL DAILY
Qty: 300 ML | Refills: 0 | Status: SHIPPED | OUTPATIENT
Start: 2020-11-20 | End: 2021-02-18

## 2020-11-27 ENCOUNTER — APPOINTMENT (OUTPATIENT)
Dept: LAB | Facility: MEDICAL CENTER | Age: 80
End: 2020-11-27
Payer: MEDICARE

## 2020-11-27 DIAGNOSIS — Z85.01 PERSONAL HISTORY OF MALIGNANT NEOPLASM OF ESOPHAGUS: ICD-10-CM

## 2020-11-27 LAB — PREALB SERPL-MCNC: 14.9 MG/DL (ref 18–40)

## 2020-11-27 PROCEDURE — 36415 COLL VENOUS BLD VENIPUNCTURE: CPT

## 2020-11-27 PROCEDURE — 84134 ASSAY OF PREALBUMIN: CPT

## 2020-11-30 ENCOUNTER — TELEPHONE (OUTPATIENT)
Dept: SURGICAL ONCOLOGY | Facility: CLINIC | Age: 80
End: 2020-11-30

## 2020-12-01 ENCOUNTER — OFFICE VISIT (OUTPATIENT)
Dept: SURGICAL ONCOLOGY | Facility: CLINIC | Age: 80
End: 2020-12-01
Payer: MEDICARE

## 2020-12-01 ENCOUNTER — APPOINTMENT (OUTPATIENT)
Dept: LAB | Facility: CLINIC | Age: 80
End: 2020-12-01
Payer: MEDICARE

## 2020-12-01 ENCOUNTER — DOCUMENTATION (OUTPATIENT)
Dept: NUTRITION | Facility: CLINIC | Age: 80
End: 2020-12-01

## 2020-12-01 VITALS
HEART RATE: 73 BPM | SYSTOLIC BLOOD PRESSURE: 100 MMHG | DIASTOLIC BLOOD PRESSURE: 70 MMHG | BODY MASS INDEX: 16.44 KG/M2 | HEIGHT: 69 IN | TEMPERATURE: 97.8 F | RESPIRATION RATE: 18 BRPM | WEIGHT: 111 LBS

## 2020-12-01 DIAGNOSIS — R62.7 FAILURE TO THRIVE IN ADULT: ICD-10-CM

## 2020-12-01 DIAGNOSIS — Z85.09 PERSONAL HISTORY OF MALIGNANT NEOPLASM OF GALLBLADDER: ICD-10-CM

## 2020-12-01 DIAGNOSIS — Z08 ENCOUNTER FOR FOLLOW-UP EXAMINATION AFTER COMPLETED TREATMENT FOR MALIGNANT NEOPLASM: Primary | ICD-10-CM

## 2020-12-01 DIAGNOSIS — Z85.01 PERSONAL HISTORY OF MALIGNANT NEOPLASM OF ESOPHAGUS: ICD-10-CM

## 2020-12-01 LAB — PREALB SERPL-MCNC: 14.3 MG/DL (ref 18–40)

## 2020-12-01 PROCEDURE — 36415 COLL VENOUS BLD VENIPUNCTURE: CPT

## 2020-12-01 PROCEDURE — 84134 ASSAY OF PREALBUMIN: CPT

## 2020-12-01 PROCEDURE — 99214 OFFICE O/P EST MOD 30 MIN: CPT | Performed by: SURGERY

## 2020-12-02 ENCOUNTER — CLINICAL SUPPORT (OUTPATIENT)
Dept: NUTRITION | Facility: HOSPITAL | Age: 80
End: 2020-12-02
Payer: MEDICARE

## 2020-12-02 VITALS — BODY MASS INDEX: 15.97 KG/M2 | WEIGHT: 107.8 LBS | HEIGHT: 69 IN

## 2020-12-02 DIAGNOSIS — R62.7 FAILURE TO THRIVE IN ADULT: ICD-10-CM

## 2020-12-08 ENCOUNTER — LAB REQUISITION (OUTPATIENT)
Dept: LAB | Facility: HOSPITAL | Age: 80
DRG: 871 | End: 2020-12-08
Payer: MEDICARE

## 2020-12-08 DIAGNOSIS — R63.4 ABNORMAL WEIGHT LOSS: ICD-10-CM

## 2020-12-08 LAB
EST. AVERAGE GLUCOSE BLD GHB EST-MCNC: 154 MG/DL
HBA1C MFR BLD: 7 %

## 2020-12-08 PROCEDURE — 86255 FLUORESCENT ANTIBODY SCREEN: CPT | Performed by: INTERNAL MEDICINE

## 2020-12-08 PROCEDURE — 82784 ASSAY IGA/IGD/IGG/IGM EACH: CPT | Performed by: INTERNAL MEDICINE

## 2020-12-08 PROCEDURE — 83036 HEMOGLOBIN GLYCOSYLATED A1C: CPT | Performed by: INTERNAL MEDICINE

## 2020-12-08 PROCEDURE — 83516 IMMUNOASSAY NONANTIBODY: CPT | Performed by: INTERNAL MEDICINE

## 2020-12-09 ENCOUNTER — HOSPITAL ENCOUNTER (INPATIENT)
Facility: HOSPITAL | Age: 80
LOS: 20 days | DRG: 871 | End: 2020-12-29
Attending: INTERNAL MEDICINE | Admitting: INTERNAL MEDICINE
Payer: MEDICARE

## 2020-12-09 ENCOUNTER — APPOINTMENT (EMERGENCY)
Dept: RADIOLOGY | Facility: HOSPITAL | Age: 80
DRG: 871 | End: 2020-12-09
Payer: MEDICARE

## 2020-12-09 DIAGNOSIS — R78.81 STREPTOCOCCAL BACTEREMIA: Primary | ICD-10-CM

## 2020-12-09 DIAGNOSIS — B95.5 STREPTOCOCCAL BACTEREMIA: Primary | ICD-10-CM

## 2020-12-09 DIAGNOSIS — R13.10 DYSPHAGIA: ICD-10-CM

## 2020-12-09 DIAGNOSIS — Z85.01 HISTORY OF ESOPHAGEAL CANCER: ICD-10-CM

## 2020-12-09 DIAGNOSIS — J96.00 ACUTE RESPIRATORY FAILURE (HCC): ICD-10-CM

## 2020-12-09 DIAGNOSIS — E43 SEVERE PROTEIN-CALORIE MALNUTRITION (HCC): ICD-10-CM

## 2020-12-09 DIAGNOSIS — A41.9 SEPSIS WITHOUT ACUTE ORGAN DYSFUNCTION, DUE TO UNSPECIFIED ORGANISM (HCC): ICD-10-CM

## 2020-12-09 LAB
ALBUMIN SERPL BCP-MCNC: 1.9 G/DL (ref 3.5–5)
ALP SERPL-CCNC: 41 U/L (ref 46–116)
ALT SERPL W P-5'-P-CCNC: 13 U/L (ref 12–78)
ANION GAP SERPL CALCULATED.3IONS-SCNC: 10 MMOL/L (ref 4–13)
APTT PPP: 36 SECONDS (ref 23–37)
AST SERPL W P-5'-P-CCNC: 13 U/L (ref 5–45)
ATRIAL RATE: 127 BPM
ATRIAL RATE: 300 BPM
BACTERIA UR QL AUTO: ABNORMAL /HPF
BASE EXCESS BLDA CALC-SCNC: -6 MMOL/L (ref -2–3)
BASOPHILS # BLD MANUAL: 0 THOUSAND/UL (ref 0–0.1)
BASOPHILS NFR MAR MANUAL: 0 % (ref 0–1)
BILIRUB DIRECT SERPL-MCNC: 0.26 MG/DL (ref 0–0.2)
BILIRUB SERPL-MCNC: 0.63 MG/DL (ref 0.2–1)
BILIRUB UR QL STRIP: NEGATIVE
BUN SERPL-MCNC: 32 MG/DL (ref 5–25)
CA-I BLD-SCNC: 1.04 MMOL/L (ref 1.12–1.32)
CALCIUM SERPL-MCNC: 8 MG/DL (ref 8.3–10.1)
CHLORIDE SERPL-SCNC: 109 MMOL/L (ref 100–108)
CLARITY UR: CLEAR
CO2 SERPL-SCNC: 23 MMOL/L (ref 21–32)
COLOR UR: YELLOW
CREAT SERPL-MCNC: 1.76 MG/DL (ref 0.6–1.3)
ENDOMYSIUM IGA SER QL: NEGATIVE
EOSINOPHIL # BLD MANUAL: 0 THOUSAND/UL (ref 0–0.4)
EOSINOPHIL NFR BLD MANUAL: 0 % (ref 0–6)
ERYTHROCYTE [DISTWIDTH] IN BLOOD BY AUTOMATED COUNT: 14.6 % (ref 11.6–15.1)
EST. AVERAGE GLUCOSE BLD GHB EST-MCNC: 151 MG/DL
FLUAV RNA RESP QL NAA+PROBE: NEGATIVE
FLUBV RNA RESP QL NAA+PROBE: NEGATIVE
GFR SERPL CREATININE-BSD FRML MDRD: 27 ML/MIN/1.73SQ M
GLIADIN PEPTIDE IGA SER-ACNC: 5 UNITS (ref 0–19)
GLIADIN PEPTIDE IGG SER-ACNC: 2 UNITS (ref 0–19)
GLUCOSE SERPL-MCNC: 129 MG/DL (ref 65–140)
GLUCOSE SERPL-MCNC: 213 MG/DL (ref 65–140)
GLUCOSE SERPL-MCNC: 221 MG/DL (ref 65–140)
GLUCOSE UR STRIP-MCNC: NEGATIVE MG/DL
HBA1C MFR BLD: 6.9 %
HCO3 BLDA-SCNC: 19.9 MMOL/L (ref 24–30)
HCT VFR BLD AUTO: 32.2 % (ref 34.8–46.1)
HCT VFR BLD CALC: 31 % (ref 34.8–46.1)
HGB BLD-MCNC: 10.2 G/DL (ref 11.5–15.4)
HGB BLDA-MCNC: 10.5 G/DL (ref 11.5–15.4)
HGB UR QL STRIP.AUTO: ABNORMAL
HYALINE CASTS #/AREA URNS LPF: ABNORMAL /LPF
IGA SERPL-MCNC: 374 MG/DL (ref 61–437)
INR PPP: 1.58 (ref 0.84–1.19)
KETONES UR STRIP-MCNC: NEGATIVE MG/DL
LACTATE SERPL-SCNC: 4.5 MMOL/L (ref 0.5–2)
LACTATE SERPL-SCNC: 9.2 MMOL/L (ref 0.5–2)
LEUKOCYTE ESTERASE UR QL STRIP: NEGATIVE
LYMPHOCYTES # BLD AUTO: 0.38 THOUSAND/UL (ref 0.6–4.47)
LYMPHOCYTES # BLD AUTO: 2 % (ref 14–44)
MACROCYTES BLD QL AUTO: PRESENT
MCH RBC QN AUTO: 32 PG (ref 26.8–34.3)
MCHC RBC AUTO-ENTMCNC: 31.7 G/DL (ref 31.4–37.4)
MCV RBC AUTO: 101 FL (ref 82–98)
METAMYELOCYTES NFR BLD MANUAL: 10 % (ref 0–1)
MONOCYTES # BLD AUTO: 1.71 THOUSAND/UL (ref 0–1.22)
MONOCYTES NFR BLD: 9 % (ref 4–12)
MYELOCYTES NFR BLD MANUAL: 4 % (ref 0–1)
NEUTROPHILS # BLD MANUAL: 14.24 THOUSAND/UL (ref 1.85–7.62)
NEUTS BAND NFR BLD MANUAL: 22 % (ref 0–8)
NEUTS SEG NFR BLD AUTO: 53 % (ref 43–75)
NITRITE UR QL STRIP: NEGATIVE
NON-SQ EPI CELLS URNS QL MICRO: ABNORMAL /HPF
NRBC BLD AUTO-RTO: 0 /100 WBCS
PCO2 BLD: 21 MMOL/L (ref 21–32)
PCO2 BLD: 40.2 MM HG (ref 42–50)
PH BLD: 7.3 [PH] (ref 7.3–7.4)
PH UR STRIP.AUTO: 5 [PH]
PLATELET # BLD AUTO: 98 THOUSANDS/UL (ref 149–390)
PLATELET BLD QL SMEAR: ABNORMAL
PMV BLD AUTO: 11 FL (ref 8.9–12.7)
PO2 BLD: 25 MM HG (ref 35–45)
POIKILOCYTOSIS BLD QL SMEAR: PRESENT
POLYCHROMASIA BLD QL SMEAR: PRESENT
POTASSIUM BLD-SCNC: 3.3 MMOL/L (ref 3.5–5.3)
POTASSIUM SERPL-SCNC: 3.4 MMOL/L (ref 3.5–5.3)
PROCALCITONIN SERPL-MCNC: 14.42 NG/ML
PROT SERPL-MCNC: 5.6 G/DL (ref 6.4–8.2)
PROT UR STRIP-MCNC: ABNORMAL MG/DL
PROTHROMBIN TIME: 18.9 SECONDS (ref 11.6–14.5)
QRS AXIS: -14 DEGREES
QRS AXIS: -28 DEGREES
QRSD INTERVAL: 86 MS
QRSD INTERVAL: 90 MS
QT INTERVAL: 318 MS
QT INTERVAL: 334 MS
QTC INTERVAL: 408 MS
QTC INTERVAL: 441 MS
RBC # BLD AUTO: 3.19 MILLION/UL (ref 3.81–5.12)
RBC #/AREA URNS AUTO: ABNORMAL /HPF
RBC MORPH BLD: PRESENT
RSV RNA RESP QL NAA+PROBE: NEGATIVE
SAO2 % BLD FROM PO2: 40 % (ref 60–85)
SARS-COV-2 RNA RESP QL NAA+PROBE: NEGATIVE
SODIUM BLD-SCNC: 140 MMOL/L (ref 136–145)
SODIUM SERPL-SCNC: 142 MMOL/L (ref 136–145)
SP GR UR STRIP.AUTO: >1.045 (ref 1–1.03)
SPECIMEN SOURCE: ABNORMAL
T WAVE AXIS: 114 DEGREES
T WAVE AXIS: 130 DEGREES
T4 FREE SERPL-MCNC: 1.39 NG/DL (ref 0.76–1.46)
TROPONIN I SERPL-MCNC: 0.07 NG/ML
TROPONIN I SERPL-MCNC: 0.09 NG/ML
TROPONIN I SERPL-MCNC: 0.12 NG/ML
TROPONIN I SERPL-MCNC: 0.12 NG/ML
TSH SERPL DL<=0.05 MIU/L-ACNC: 1.69 UIU/ML (ref 0.36–3.74)
TTG IGA SER-ACNC: <2 U/ML (ref 0–3)
TTG IGG SER-ACNC: <2 U/ML (ref 0–5)
UROBILINOGEN UR QL STRIP.AUTO: 0.2 E.U./DL
VENTRICULAR RATE: 105 BPM
VENTRICULAR RATE: 99 BPM
WBC # BLD AUTO: 18.99 THOUSAND/UL (ref 4.31–10.16)
WBC #/AREA URNS AUTO: ABNORMAL /HPF

## 2020-12-09 PROCEDURE — 85610 PROTHROMBIN TIME: CPT | Performed by: EMERGENCY MEDICINE

## 2020-12-09 PROCEDURE — 84443 ASSAY THYROID STIM HORMONE: CPT | Performed by: INTERNAL MEDICINE

## 2020-12-09 PROCEDURE — 84439 ASSAY OF FREE THYROXINE: CPT | Performed by: EMERGENCY MEDICINE

## 2020-12-09 PROCEDURE — 99285 EMERGENCY DEPT VISIT HI MDM: CPT

## 2020-12-09 PROCEDURE — 84132 ASSAY OF SERUM POTASSIUM: CPT

## 2020-12-09 PROCEDURE — 96365 THER/PROPH/DIAG IV INF INIT: CPT

## 2020-12-09 PROCEDURE — 87449 NOS EACH ORGANISM AG IA: CPT | Performed by: PHYSICIAN ASSISTANT

## 2020-12-09 PROCEDURE — 83605 ASSAY OF LACTIC ACID: CPT | Performed by: EMERGENCY MEDICINE

## 2020-12-09 PROCEDURE — 84484 ASSAY OF TROPONIN QUANT: CPT | Performed by: INTERNAL MEDICINE

## 2020-12-09 PROCEDURE — 80076 HEPATIC FUNCTION PANEL: CPT | Performed by: EMERGENCY MEDICINE

## 2020-12-09 PROCEDURE — 85730 THROMBOPLASTIN TIME PARTIAL: CPT | Performed by: EMERGENCY MEDICINE

## 2020-12-09 PROCEDURE — 82947 ASSAY GLUCOSE BLOOD QUANT: CPT

## 2020-12-09 PROCEDURE — 36415 COLL VENOUS BLD VENIPUNCTURE: CPT | Performed by: STUDENT IN AN ORGANIZED HEALTH CARE EDUCATION/TRAINING PROGRAM

## 2020-12-09 PROCEDURE — 82803 BLOOD GASES ANY COMBINATION: CPT

## 2020-12-09 PROCEDURE — 83036 HEMOGLOBIN GLYCOSYLATED A1C: CPT | Performed by: INTERNAL MEDICINE

## 2020-12-09 PROCEDURE — 72125 CT NECK SPINE W/O DYE: CPT

## 2020-12-09 PROCEDURE — 82948 REAGENT STRIP/BLOOD GLUCOSE: CPT

## 2020-12-09 PROCEDURE — 84145 PROCALCITONIN (PCT): CPT | Performed by: EMERGENCY MEDICINE

## 2020-12-09 PROCEDURE — 96368 THER/DIAG CONCURRENT INF: CPT

## 2020-12-09 PROCEDURE — 99222 1ST HOSP IP/OBS MODERATE 55: CPT | Performed by: SURGERY

## 2020-12-09 PROCEDURE — 70450 CT HEAD/BRAIN W/O DYE: CPT

## 2020-12-09 PROCEDURE — 80048 BASIC METABOLIC PNL TOTAL CA: CPT | Performed by: INTERNAL MEDICINE

## 2020-12-09 PROCEDURE — 71275 CT ANGIOGRAPHY CHEST: CPT

## 2020-12-09 PROCEDURE — 0241U HB NFCT DS VIR RESP RNA 4 TRGT: CPT | Performed by: EMERGENCY MEDICINE

## 2020-12-09 PROCEDURE — 85027 COMPLETE CBC AUTOMATED: CPT | Performed by: INTERNAL MEDICINE

## 2020-12-09 PROCEDURE — 93005 ELECTROCARDIOGRAM TRACING: CPT

## 2020-12-09 PROCEDURE — 85014 HEMATOCRIT: CPT

## 2020-12-09 PROCEDURE — 87077 CULTURE AEROBIC IDENTIFY: CPT | Performed by: EMERGENCY MEDICINE

## 2020-12-09 PROCEDURE — 1123F ACP DISCUSS/DSCN MKR DOCD: CPT | Performed by: INTERNAL MEDICINE

## 2020-12-09 PROCEDURE — 74177 CT ABD & PELVIS W/CONTRAST: CPT

## 2020-12-09 PROCEDURE — 93010 ELECTROCARDIOGRAM REPORT: CPT | Performed by: INTERNAL MEDICINE

## 2020-12-09 PROCEDURE — 84295 ASSAY OF SERUM SODIUM: CPT

## 2020-12-09 PROCEDURE — 85007 BL SMEAR W/DIFF WBC COUNT: CPT | Performed by: INTERNAL MEDICINE

## 2020-12-09 PROCEDURE — 99223 1ST HOSP IP/OBS HIGH 75: CPT | Performed by: EMERGENCY MEDICINE

## 2020-12-09 PROCEDURE — 82330 ASSAY OF CALCIUM: CPT

## 2020-12-09 PROCEDURE — 87040 BLOOD CULTURE FOR BACTERIA: CPT | Performed by: EMERGENCY MEDICINE

## 2020-12-09 PROCEDURE — 81001 URINALYSIS AUTO W/SCOPE: CPT | Performed by: INTERNAL MEDICINE

## 2020-12-09 PROCEDURE — 84484 ASSAY OF TROPONIN QUANT: CPT | Performed by: SURGERY

## 2020-12-09 PROCEDURE — 87181 SC STD AGAR DILUTION PER AGT: CPT | Performed by: EMERGENCY MEDICINE

## 2020-12-09 RX ORDER — AZITHROMYCIN 500 MG/1
500 TABLET, FILM COATED ORAL EVERY 24 HOURS
Status: DISCONTINUED | OUTPATIENT
Start: 2020-12-10 | End: 2020-12-10

## 2020-12-09 RX ORDER — CHLORHEXIDINE GLUCONATE 0.12 MG/ML
15 RINSE ORAL EVERY 12 HOURS SCHEDULED
Status: DISCONTINUED | OUTPATIENT
Start: 2020-12-09 | End: 2020-12-12

## 2020-12-09 RX ORDER — BISACODYL 10 MG
10 SUPPOSITORY, RECTAL RECTAL DAILY PRN
Status: DISCONTINUED | OUTPATIENT
Start: 2020-12-09 | End: 2020-12-28

## 2020-12-09 RX ORDER — VANCOMYCIN HYDROCHLORIDE 500 MG/100ML
15 INJECTION, SOLUTION INTRAVENOUS EVERY 24 HOURS
Status: DISCONTINUED | OUTPATIENT
Start: 2020-12-10 | End: 2020-12-10

## 2020-12-09 RX ORDER — NOREPINEPHRINE BITARTRATE 1 MG/ML
INJECTION, SOLUTION INTRAVENOUS
Status: COMPLETED
Start: 2020-12-09 | End: 2020-12-09

## 2020-12-09 RX ORDER — FINASTERIDE 5 MG/1
5 TABLET, FILM COATED ORAL DAILY
Status: DISCONTINUED | OUTPATIENT
Start: 2020-12-09 | End: 2020-12-28

## 2020-12-09 RX ORDER — SODIUM CHLORIDE, SODIUM GLUCONATE, SODIUM ACETATE, POTASSIUM CHLORIDE, MAGNESIUM CHLORIDE, SODIUM PHOSPHATE, DIBASIC, AND POTASSIUM PHOSPHATE .53; .5; .37; .037; .03; .012; .00082 G/100ML; G/100ML; G/100ML; G/100ML; G/100ML; G/100ML; G/100ML
1000 INJECTION, SOLUTION INTRAVENOUS ONCE
Status: COMPLETED | OUTPATIENT
Start: 2020-12-09 | End: 2020-12-09

## 2020-12-09 RX ORDER — SODIUM CHLORIDE, SODIUM GLUCONATE, SODIUM ACETATE, POTASSIUM CHLORIDE, MAGNESIUM CHLORIDE, SODIUM PHOSPHATE, DIBASIC, AND POTASSIUM PHOSPHATE .53; .5; .37; .037; .03; .012; .00082 G/100ML; G/100ML; G/100ML; G/100ML; G/100ML; G/100ML; G/100ML
500 INJECTION, SOLUTION INTRAVENOUS ONCE
Status: DISCONTINUED | OUTPATIENT
Start: 2020-12-09 | End: 2020-12-09

## 2020-12-09 RX ORDER — ONDANSETRON 2 MG/ML
INJECTION INTRAMUSCULAR; INTRAVENOUS
Status: COMPLETED
Start: 2020-12-09 | End: 2020-12-09

## 2020-12-09 RX ORDER — AMOXICILLIN 250 MG
1 CAPSULE ORAL
Status: DISCONTINUED | OUTPATIENT
Start: 2020-12-09 | End: 2020-12-14

## 2020-12-09 RX ORDER — SODIUM CHLORIDE, SODIUM GLUCONATE, SODIUM ACETATE, POTASSIUM CHLORIDE, MAGNESIUM CHLORIDE, SODIUM PHOSPHATE, DIBASIC, AND POTASSIUM PHOSPHATE .53; .5; .37; .037; .03; .012; .00082 G/100ML; G/100ML; G/100ML; G/100ML; G/100ML; G/100ML; G/100ML
75 INJECTION, SOLUTION INTRAVENOUS CONTINUOUS
Status: DISCONTINUED | OUTPATIENT
Start: 2020-12-09 | End: 2020-12-11

## 2020-12-09 RX ADMIN — SODIUM CHLORIDE, SODIUM GLUCONATE, SODIUM ACETATE, POTASSIUM CHLORIDE, MAGNESIUM CHLORIDE, SODIUM PHOSPHATE, DIBASIC, AND POTASSIUM PHOSPHATE 1000 ML: .53; .5; .37; .037; .03; .012; .00082 INJECTION, SOLUTION INTRAVENOUS at 11:25

## 2020-12-09 RX ADMIN — CEFEPIME HYDROCHLORIDE 2000 MG: 2 INJECTION, POWDER, FOR SOLUTION INTRAVENOUS at 22:00

## 2020-12-09 RX ADMIN — VANCOMYCIN HYDROCHLORIDE 750 MG: 750 INJECTION, SOLUTION INTRAVENOUS at 11:36

## 2020-12-09 RX ADMIN — AZITHROMYCIN MONOHYDRATE 500 MG: 500 INJECTION, POWDER, LYOPHILIZED, FOR SOLUTION INTRAVENOUS at 10:05

## 2020-12-09 RX ADMIN — CEFTRIAXONE SODIUM 1000 MG: 10 INJECTION, POWDER, FOR SOLUTION INTRAVENOUS at 09:07

## 2020-12-09 RX ADMIN — NOREPINEPHRINE BITARTRATE 17 MCG/MIN: 1 INJECTION, SOLUTION, CONCENTRATE INTRAVENOUS at 16:01

## 2020-12-09 RX ADMIN — NOREPINEPHRINE BITARTRATE 10 MCG/MIN: 1 INJECTION, SOLUTION, CONCENTRATE INTRAVENOUS at 23:47

## 2020-12-09 RX ADMIN — SODIUM CHLORIDE, SODIUM LACTATE, POTASSIUM CHLORIDE, AND CALCIUM CHLORIDE 1000 ML: .6; .31; .03; .02 INJECTION, SOLUTION INTRAVENOUS at 09:05

## 2020-12-09 RX ADMIN — CHLORHEXIDINE GLUCONATE 15 ML: 1.2 RINSE ORAL at 22:00

## 2020-12-09 RX ADMIN — ONDANSETRON 4 MG: 2 INJECTION INTRAMUSCULAR; INTRAVENOUS at 10:19

## 2020-12-09 RX ADMIN — IOHEXOL 100 ML: 350 INJECTION, SOLUTION INTRAVENOUS at 07:47

## 2020-12-09 RX ADMIN — SODIUM CHLORIDE, SODIUM GLUCONATE, SODIUM ACETATE, POTASSIUM CHLORIDE, MAGNESIUM CHLORIDE, SODIUM PHOSPHATE, DIBASIC, AND POTASSIUM PHOSPHATE 75 ML/HR: .53; .5; .37; .037; .03; .012; .00082 INJECTION, SOLUTION INTRAVENOUS at 13:26

## 2020-12-09 RX ADMIN — NOREPINEPHRINE BITARTRATE 2 MCG/MIN: 1 INJECTION, SOLUTION, CONCENTRATE INTRAVENOUS at 11:13

## 2020-12-10 PROBLEM — E43 SEVERE PROTEIN-CALORIE MALNUTRITION (HCC): Status: ACTIVE | Noted: 2020-12-10

## 2020-12-10 PROBLEM — A41.9 SEPSIS (HCC): Status: ACTIVE | Noted: 2020-12-10

## 2020-12-10 LAB
ALBUMIN SERPL BCP-MCNC: 1.7 G/DL (ref 3.5–5)
ALP SERPL-CCNC: 43 U/L (ref 46–116)
ALT SERPL W P-5'-P-CCNC: 18 U/L (ref 12–78)
ANION GAP SERPL CALCULATED.3IONS-SCNC: 4 MMOL/L (ref 4–13)
AST SERPL W P-5'-P-CCNC: 29 U/L (ref 5–45)
ATRIAL RATE: 63 BPM
BILIRUB SERPL-MCNC: 0.48 MG/DL (ref 0.2–1)
BUN SERPL-MCNC: 32 MG/DL (ref 5–25)
CALCIUM ALBUM COR SERPL-MCNC: 9.9 MG/DL (ref 8.3–10.1)
CALCIUM SERPL-MCNC: 8.1 MG/DL (ref 8.3–10.1)
CHLORIDE SERPL-SCNC: 107 MMOL/L (ref 100–108)
CO2 SERPL-SCNC: 31 MMOL/L (ref 21–32)
CREAT SERPL-MCNC: 1.36 MG/DL (ref 0.6–1.3)
ERYTHROCYTE [DISTWIDTH] IN BLOOD BY AUTOMATED COUNT: 15.1 % (ref 11.6–15.1)
GFR SERPL CREATININE-BSD FRML MDRD: 37 ML/MIN/1.73SQ M
GLUCOSE SERPL-MCNC: 160 MG/DL (ref 65–140)
GLUCOSE SERPL-MCNC: 210 MG/DL (ref 65–140)
GLUCOSE SERPL-MCNC: 218 MG/DL (ref 65–140)
GLUCOSE SERPL-MCNC: 73 MG/DL (ref 65–140)
GLUCOSE SERPL-MCNC: 75 MG/DL (ref 65–140)
GLUCOSE SERPL-MCNC: 95 MG/DL (ref 65–140)
HCT VFR BLD AUTO: 31.3 % (ref 34.8–46.1)
HGB BLD-MCNC: 10.1 G/DL (ref 11.5–15.4)
L PNEUMO1 AG UR QL IA.RAPID: NEGATIVE
LACTATE SERPL-SCNC: 1.9 MMOL/L (ref 0.5–2)
MCH RBC QN AUTO: 32 PG (ref 26.8–34.3)
MCHC RBC AUTO-ENTMCNC: 32.3 G/DL (ref 31.4–37.4)
MCV RBC AUTO: 99 FL (ref 82–98)
NRBC BLD AUTO-RTO: 0 /100 WBCS
P AXIS: 85 DEGREES
PLATELET # BLD AUTO: 106 THOUSANDS/UL (ref 149–390)
PMV BLD AUTO: 10.8 FL (ref 8.9–12.7)
POTASSIUM SERPL-SCNC: 4.3 MMOL/L (ref 3.5–5.3)
PR INTERVAL: 158 MS
PROCALCITONIN SERPL-MCNC: 29.92 NG/ML
PROT SERPL-MCNC: 5.5 G/DL (ref 6.4–8.2)
QRS AXIS: 62 DEGREES
QRSD INTERVAL: 88 MS
QT INTERVAL: 398 MS
QTC INTERVAL: 407 MS
RBC # BLD AUTO: 3.16 MILLION/UL (ref 3.81–5.12)
S PNEUM AG UR QL: POSITIVE
SODIUM SERPL-SCNC: 142 MMOL/L (ref 136–145)
T WAVE AXIS: 80 DEGREES
VENTRICULAR RATE: 63 BPM
WBC # BLD AUTO: 18.35 THOUSAND/UL (ref 4.31–10.16)

## 2020-12-10 PROCEDURE — 93005 ELECTROCARDIOGRAM TRACING: CPT

## 2020-12-10 PROCEDURE — 93010 ELECTROCARDIOGRAM REPORT: CPT | Performed by: INTERNAL MEDICINE

## 2020-12-10 PROCEDURE — 85027 COMPLETE CBC AUTOMATED: CPT | Performed by: INTERNAL MEDICINE

## 2020-12-10 PROCEDURE — 87081 CULTURE SCREEN ONLY: CPT | Performed by: INTERNAL MEDICINE

## 2020-12-10 PROCEDURE — 83605 ASSAY OF LACTIC ACID: CPT | Performed by: INTERNAL MEDICINE

## 2020-12-10 PROCEDURE — 99233 SBSQ HOSP IP/OBS HIGH 50: CPT | Performed by: EMERGENCY MEDICINE

## 2020-12-10 PROCEDURE — 84145 PROCALCITONIN (PCT): CPT | Performed by: PHYSICIAN ASSISTANT

## 2020-12-10 PROCEDURE — 80053 COMPREHEN METABOLIC PANEL: CPT | Performed by: INTERNAL MEDICINE

## 2020-12-10 PROCEDURE — 82948 REAGENT STRIP/BLOOD GLUCOSE: CPT

## 2020-12-10 RX ORDER — POLYETHYLENE GLYCOL 3350 17 G/17G
17 POWDER, FOR SOLUTION ORAL DAILY
Status: DISCONTINUED | OUTPATIENT
Start: 2020-12-10 | End: 2020-12-28

## 2020-12-10 RX ORDER — MEGESTROL ACETATE 40 MG/ML
400 SUSPENSION ORAL DAILY
Status: DISCONTINUED | OUTPATIENT
Start: 2020-12-10 | End: 2020-12-23

## 2020-12-10 RX ORDER — ALBUMIN, HUMAN INJ 5% 5 %
25 SOLUTION INTRAVENOUS ONCE
Status: COMPLETED | OUTPATIENT
Start: 2020-12-10 | End: 2020-12-10

## 2020-12-10 RX ADMIN — POLYETHYLENE GLYCOL 3350 17 G: 17 POWDER, FOR SOLUTION ORAL at 08:37

## 2020-12-10 RX ADMIN — SODIUM CHLORIDE, SODIUM GLUCONATE, SODIUM ACETATE, POTASSIUM CHLORIDE, MAGNESIUM CHLORIDE, SODIUM PHOSPHATE, DIBASIC, AND POTASSIUM PHOSPHATE 75 ML/HR: .53; .5; .37; .037; .03; .012; .00082 INJECTION, SOLUTION INTRAVENOUS at 20:12

## 2020-12-10 RX ADMIN — CHLORHEXIDINE GLUCONATE 15 ML: 1.2 RINSE ORAL at 20:16

## 2020-12-10 RX ADMIN — APIXABAN 2.5 MG: 2.5 TABLET, FILM COATED ORAL at 18:07

## 2020-12-10 RX ADMIN — ALBUMIN (HUMAN) 25 G: 12.5 INJECTION, SOLUTION INTRAVENOUS at 12:05

## 2020-12-10 RX ADMIN — CEFEPIME HYDROCHLORIDE 2000 MG: 2 INJECTION, POWDER, FOR SOLUTION INTRAVENOUS at 08:54

## 2020-12-10 RX ADMIN — VANCOMYCIN HYDROCHLORIDE 750 MG: 750 INJECTION, SOLUTION INTRAVENOUS at 09:41

## 2020-12-10 RX ADMIN — INSULIN LISPRO 1 UNITS: 100 INJECTION, SOLUTION INTRAVENOUS; SUBCUTANEOUS at 14:03

## 2020-12-10 RX ADMIN — CHLORHEXIDINE GLUCONATE 15 ML: 1.2 RINSE ORAL at 09:41

## 2020-12-10 RX ADMIN — APIXABAN 2.5 MG: 2.5 TABLET, FILM COATED ORAL at 11:59

## 2020-12-10 RX ADMIN — NOREPINEPHRINE BITARTRATE 8 MCG/MIN: 1 INJECTION, SOLUTION, CONCENTRATE INTRAVENOUS at 06:56

## 2020-12-10 RX ADMIN — NOREPINEPHRINE BITARTRATE 7 MCG/MIN: 1 INJECTION, SOLUTION, CONCENTRATE INTRAVENOUS at 15:47

## 2020-12-10 RX ADMIN — MEGESTROL ACETATE 400 MG: 40 SUSPENSION ORAL at 11:59

## 2020-12-10 RX ADMIN — SODIUM CHLORIDE, SODIUM GLUCONATE, SODIUM ACETATE, POTASSIUM CHLORIDE, MAGNESIUM CHLORIDE, SODIUM PHOSPHATE, DIBASIC, AND POTASSIUM PHOSPHATE 75 ML/HR: .53; .5; .37; .037; .03; .012; .00082 INJECTION, SOLUTION INTRAVENOUS at 04:00

## 2020-12-10 RX ADMIN — DOCUSATE SODIUM AND SENNOSIDES 1 TABLET: 8.6; 5 TABLET ORAL at 22:00

## 2020-12-10 RX ADMIN — FINASTERIDE 5 MG: 5 TABLET, FILM COATED ORAL at 08:36

## 2020-12-11 PROBLEM — S00.211A: Status: ACTIVE | Noted: 2020-12-11

## 2020-12-11 PROBLEM — Z79.01 CHRONIC ANTICOAGULATION: Status: ACTIVE | Noted: 2020-12-11
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
## 2020-12-11 LAB
ALBUMIN SERPL BCP-MCNC: 2 G/DL (ref 3.5–5)
ALP SERPL-CCNC: 47 U/L (ref 46–116)
ALT SERPL W P-5'-P-CCNC: 18 U/L (ref 12–78)
ANION GAP SERPL CALCULATED.3IONS-SCNC: 6 MMOL/L (ref 4–13)
AST SERPL W P-5'-P-CCNC: 21 U/L (ref 5–45)
BASOPHILS NFR BLD AUTO: 0 % (ref 0–1)
BILIRUB SERPL-MCNC: 0.53 MG/DL (ref 0.2–1)
BUN SERPL-MCNC: 31 MG/DL (ref 5–25)
CALCIUM ALBUM COR SERPL-MCNC: 10.3 MG/DL (ref 8.3–10.1)
CALCIUM SERPL-MCNC: 8.7 MG/DL (ref 8.3–10.1)
CHLORIDE SERPL-SCNC: 105 MMOL/L (ref 100–108)
CO2 SERPL-SCNC: 30 MMOL/L (ref 21–32)
CREAT SERPL-MCNC: 1.18 MG/DL (ref 0.6–1.3)
EOSINOPHIL NFR BLD AUTO: 0 % (ref 0–6)
ERYTHROCYTE [DISTWIDTH] IN BLOOD BY AUTOMATED COUNT: 15.3 % (ref 11.6–15.1)
GFR SERPL CREATININE-BSD FRML MDRD: 43 ML/MIN/1.73SQ M
GLUCOSE SERPL-MCNC: 111 MG/DL (ref 65–140)
GLUCOSE SERPL-MCNC: 113 MG/DL (ref 65–140)
GLUCOSE SERPL-MCNC: 126 MG/DL (ref 65–140)
GLUCOSE SERPL-MCNC: 126 MG/DL (ref 65–140)
GLUCOSE SERPL-MCNC: 142 MG/DL (ref 65–140)
GLUCOSE SERPL-MCNC: 238 MG/DL (ref 65–140)
GLUCOSE SERPL-MCNC: 310 MG/DL (ref 65–140)
HCT VFR BLD AUTO: 29.6 % (ref 34.8–46.1)
HGB BLD-MCNC: 9.6 G/DL (ref 11.5–15.4)
IMM GRANULOCYTES NFR BLD AUTO: 5 % (ref 0–2)
LYMPHOCYTES NFR BLD AUTO: 3 % (ref 14–44)
MAGNESIUM SERPL-MCNC: 1.9 MG/DL (ref 1.6–2.6)
MCH RBC QN AUTO: 32.2 PG (ref 26.8–34.3)
MCHC RBC AUTO-ENTMCNC: 32.4 G/DL (ref 31.4–37.4)
MCV RBC AUTO: 99 FL (ref 82–98)
MONOCYTES NFR BLD AUTO: 5 % (ref 4–12)
MRSA NOSE QL CULT: NORMAL
NEUTS SEG NFR BLD AUTO: 87 % (ref 43–75)
NRBC BLD AUTO-RTO: 0 /100 WBCS
PHOSPHATE SERPL-MCNC: 2.8 MG/DL (ref 2.3–4.1)
PLATELET # BLD AUTO: 86 THOUSANDS/UL (ref 149–390)
PMV BLD AUTO: 11 FL (ref 8.9–12.7)
POTASSIUM SERPL-SCNC: 3.8 MMOL/L (ref 3.5–5.3)
PROCALCITONIN SERPL-MCNC: 19.99 NG/ML
PROT SERPL-MCNC: 5.5 G/DL (ref 6.4–8.2)
RBC # BLD AUTO: 2.98 MILLION/UL (ref 3.81–5.12)
SODIUM SERPL-SCNC: 141 MMOL/L (ref 136–145)
WBC # BLD AUTO: 19.79 THOUSAND/UL (ref 4.31–10.16)

## 2020-12-11 PROCEDURE — 84100 ASSAY OF PHOSPHORUS: CPT | Performed by: PHYSICIAN ASSISTANT

## 2020-12-11 PROCEDURE — 82948 REAGENT STRIP/BLOOD GLUCOSE: CPT

## 2020-12-11 PROCEDURE — 83735 ASSAY OF MAGNESIUM: CPT | Performed by: PHYSICIAN ASSISTANT

## 2020-12-11 PROCEDURE — 97163 PT EVAL HIGH COMPLEX 45 MIN: CPT

## 2020-12-11 PROCEDURE — 84145 PROCALCITONIN (PCT): CPT | Performed by: STUDENT IN AN ORGANIZED HEALTH CARE EDUCATION/TRAINING PROGRAM

## 2020-12-11 PROCEDURE — 80053 COMPREHEN METABOLIC PANEL: CPT | Performed by: STUDENT IN AN ORGANIZED HEALTH CARE EDUCATION/TRAINING PROGRAM

## 2020-12-11 PROCEDURE — 94668 MNPJ CHEST WALL SBSQ: CPT

## 2020-12-11 PROCEDURE — 99233 SBSQ HOSP IP/OBS HIGH 50: CPT | Performed by: EMERGENCY MEDICINE

## 2020-12-11 PROCEDURE — 94760 N-INVAS EAR/PLS OXIMETRY 1: CPT

## 2020-12-11 PROCEDURE — 97167 OT EVAL HIGH COMPLEX 60 MIN: CPT

## 2020-12-11 PROCEDURE — 85027 COMPLETE CBC AUTOMATED: CPT | Performed by: STUDENT IN AN ORGANIZED HEALTH CARE EDUCATION/TRAINING PROGRAM

## 2020-12-11 PROCEDURE — NC001 PR NO CHARGE: Performed by: EMERGENCY MEDICINE

## 2020-12-11 PROCEDURE — 99232 SBSQ HOSP IP/OBS MODERATE 35: CPT | Performed by: PHYSICIAN ASSISTANT

## 2020-12-11 RX ADMIN — CEFTRIAXONE SODIUM 1000 MG: 10 INJECTION, POWDER, FOR SOLUTION INTRAVENOUS at 11:34

## 2020-12-11 RX ADMIN — APIXABAN 2.5 MG: 2.5 TABLET, FILM COATED ORAL at 18:11

## 2020-12-11 RX ADMIN — INSULIN LISPRO 2 UNITS: 100 INJECTION, SOLUTION INTRAVENOUS; SUBCUTANEOUS at 13:59

## 2020-12-11 RX ADMIN — DOCUSATE SODIUM AND SENNOSIDES 1 TABLET: 8.6; 5 TABLET ORAL at 21:26

## 2020-12-11 RX ADMIN — APIXABAN 2.5 MG: 2.5 TABLET, FILM COATED ORAL at 08:39

## 2020-12-11 RX ADMIN — NOREPINEPHRINE BITARTRATE 8 MCG/MIN: 1 INJECTION, SOLUTION, CONCENTRATE INTRAVENOUS at 01:03

## 2020-12-11 RX ADMIN — POLYETHYLENE GLYCOL 3350 17 G: 17 POWDER, FOR SOLUTION ORAL at 08:39

## 2020-12-11 RX ADMIN — CHLORHEXIDINE GLUCONATE 15 ML: 1.2 RINSE ORAL at 21:26

## 2020-12-11 RX ADMIN — MEGESTROL ACETATE 400 MG: 40 SUSPENSION ORAL at 08:39

## 2020-12-11 RX ADMIN — FINASTERIDE 5 MG: 5 TABLET, FILM COATED ORAL at 08:39

## 2020-12-11 RX ADMIN — CEFTRIAXONE SODIUM 1000 MG: 10 INJECTION, POWDER, FOR SOLUTION INTRAVENOUS at 07:56

## 2020-12-11 RX ADMIN — VANCOMYCIN HYDROCHLORIDE 750 MG: 750 INJECTION, SOLUTION INTRAVENOUS at 08:42

## 2020-12-12 ENCOUNTER — APPOINTMENT (INPATIENT)
Dept: NON INVASIVE DIAGNOSTICS | Facility: HOSPITAL | Age: 80
DRG: 871 | End: 2020-12-12
Payer: MEDICARE

## 2020-12-12 PROBLEM — Z85.01 HISTORY OF ESOPHAGEAL CANCER: Status: ACTIVE | Noted: 2020-12-12

## 2020-12-12 PROBLEM — B95.5 STREPTOCOCCAL BACTEREMIA: Status: ACTIVE | Noted: 2020-12-12

## 2020-12-12 PROBLEM — E11.9 TYPE 2 DIABETES MELLITUS (HCC): Status: ACTIVE | Noted: 2020-12-12

## 2020-12-12 PROBLEM — J13 PNEUMONIA DUE TO STREPTOCOCCUS PNEUMONIAE (HCC): Status: ACTIVE | Noted: 2020-12-12

## 2020-12-12 PROBLEM — I48.0 PAROXYSMAL ATRIAL FIBRILLATION (HCC): Status: ACTIVE | Noted: 2020-12-12

## 2020-12-12 PROBLEM — R78.81 STREPTOCOCCAL BACTEREMIA: Status: ACTIVE | Noted: 2020-12-12

## 2020-12-12 LAB
ANION GAP SERPL CALCULATED.3IONS-SCNC: 5 MMOL/L (ref 4–13)
BACTERIA BLD CULT: ABNORMAL
BACTERIA BLD CULT: ABNORMAL
BUN SERPL-MCNC: 34 MG/DL (ref 5–25)
CALCIUM SERPL-MCNC: 8.9 MG/DL (ref 8.3–10.1)
CHLORIDE SERPL-SCNC: 104 MMOL/L (ref 100–108)
CO2 SERPL-SCNC: 30 MMOL/L (ref 21–32)
CREAT SERPL-MCNC: 1.12 MG/DL (ref 0.6–1.3)
ERYTHROCYTE [DISTWIDTH] IN BLOOD BY AUTOMATED COUNT: 15.1 % (ref 11.6–15.1)
GFR SERPL CREATININE-BSD FRML MDRD: 61 ML/MIN/1.73SQ M
GLUCOSE SERPL-MCNC: 124 MG/DL (ref 65–140)
GLUCOSE SERPL-MCNC: 127 MG/DL (ref 65–140)
GLUCOSE SERPL-MCNC: 183 MG/DL (ref 65–140)
GLUCOSE SERPL-MCNC: 215 MG/DL (ref 65–140)
GLUCOSE SERPL-MCNC: 233 MG/DL (ref 65–140)
GRAM STN SPEC: ABNORMAL
GRAM STN SPEC: ABNORMAL
HCT VFR BLD AUTO: 30.2 % (ref 36.5–49.3)
HGB BLD-MCNC: 9.7 G/DL (ref 12–17)
MAGNESIUM SERPL-MCNC: 1.9 MG/DL (ref 1.6–2.6)
MCH RBC QN AUTO: 31.5 PG (ref 26.8–34.3)
MCHC RBC AUTO-ENTMCNC: 32.1 G/DL (ref 31.4–37.4)
MCV RBC AUTO: 98 FL (ref 82–98)
PHOSPHATE SERPL-MCNC: 2.3 MG/DL (ref 2.3–4.1)
PLATELET # BLD AUTO: 60 THOUSANDS/UL (ref 149–390)
PMV BLD AUTO: 10.9 FL (ref 8.9–12.7)
POTASSIUM SERPL-SCNC: 3.5 MMOL/L (ref 3.5–5.3)
PROCALCITONIN SERPL-MCNC: 9.84 NG/ML
RBC # BLD AUTO: 3.08 MILLION/UL (ref 3.88–5.62)
SODIUM SERPL-SCNC: 139 MMOL/L (ref 136–145)
WBC # BLD AUTO: 14.72 THOUSAND/UL (ref 4.31–10.16)

## 2020-12-12 PROCEDURE — 83735 ASSAY OF MAGNESIUM: CPT | Performed by: STUDENT IN AN ORGANIZED HEALTH CARE EDUCATION/TRAINING PROGRAM

## 2020-12-12 PROCEDURE — 82948 REAGENT STRIP/BLOOD GLUCOSE: CPT

## 2020-12-12 PROCEDURE — 84145 PROCALCITONIN (PCT): CPT | Performed by: STUDENT IN AN ORGANIZED HEALTH CARE EDUCATION/TRAINING PROGRAM

## 2020-12-12 PROCEDURE — 94668 MNPJ CHEST WALL SBSQ: CPT

## 2020-12-12 PROCEDURE — 80048 BASIC METABOLIC PNL TOTAL CA: CPT | Performed by: STUDENT IN AN ORGANIZED HEALTH CARE EDUCATION/TRAINING PROGRAM

## 2020-12-12 PROCEDURE — 85027 COMPLETE CBC AUTOMATED: CPT | Performed by: STUDENT IN AN ORGANIZED HEALTH CARE EDUCATION/TRAINING PROGRAM

## 2020-12-12 PROCEDURE — 93308 TTE F-UP OR LMTD: CPT

## 2020-12-12 PROCEDURE — 84100 ASSAY OF PHOSPHORUS: CPT | Performed by: STUDENT IN AN ORGANIZED HEALTH CARE EDUCATION/TRAINING PROGRAM

## 2020-12-12 PROCEDURE — 99223 1ST HOSP IP/OBS HIGH 75: CPT | Performed by: INTERNAL MEDICINE

## 2020-12-12 PROCEDURE — 94760 N-INVAS EAR/PLS OXIMETRY 1: CPT

## 2020-12-12 PROCEDURE — 99232 SBSQ HOSP IP/OBS MODERATE 35: CPT | Performed by: INTERNAL MEDICINE

## 2020-12-12 RX ORDER — GUAIFENESIN 600 MG
600 TABLET, EXTENDED RELEASE 12 HR ORAL EVERY 12 HOURS SCHEDULED
Status: DISCONTINUED | OUTPATIENT
Start: 2020-12-12 | End: 2020-12-14

## 2020-12-12 RX ORDER — INSULIN GLARGINE 100 [IU]/ML
5 INJECTION, SOLUTION SUBCUTANEOUS
Status: DISCONTINUED | OUTPATIENT
Start: 2020-12-12 | End: 2020-12-24

## 2020-12-12 RX ADMIN — APIXABAN 2.5 MG: 2.5 TABLET, FILM COATED ORAL at 16:55

## 2020-12-12 RX ADMIN — INSULIN LISPRO 1 UNITS: 100 INJECTION, SOLUTION INTRAVENOUS; SUBCUTANEOUS at 11:24

## 2020-12-12 RX ADMIN — MEGESTROL ACETATE 400 MG: 40 SUSPENSION ORAL at 08:50

## 2020-12-12 RX ADMIN — DOCUSATE SODIUM AND SENNOSIDES 1 TABLET: 8.6; 5 TABLET ORAL at 21:42

## 2020-12-12 RX ADMIN — INSULIN GLARGINE 5 UNITS: 100 INJECTION, SOLUTION SUBCUTANEOUS at 21:40

## 2020-12-12 RX ADMIN — FINASTERIDE 5 MG: 5 TABLET, FILM COATED ORAL at 08:50

## 2020-12-12 RX ADMIN — GUAIFENESIN 600 MG: 600 TABLET, EXTENDED RELEASE ORAL at 21:40

## 2020-12-12 RX ADMIN — CHLORHEXIDINE GLUCONATE 15 ML: 1.2 RINSE ORAL at 08:50

## 2020-12-12 RX ADMIN — APIXABAN 2.5 MG: 2.5 TABLET, FILM COATED ORAL at 08:50

## 2020-12-12 RX ADMIN — POLYETHYLENE GLYCOL 3350 17 G: 17 POWDER, FOR SOLUTION ORAL at 08:50

## 2020-12-12 RX ADMIN — GUAIFENESIN 600 MG: 600 TABLET, EXTENDED RELEASE ORAL at 13:14

## 2020-12-12 RX ADMIN — CEFTRIAXONE SODIUM 2000 MG: 10 INJECTION, POWDER, FOR SOLUTION INTRAVENOUS at 08:50

## 2020-12-12 RX ADMIN — INSULIN LISPRO 1 UNITS: 100 INJECTION, SOLUTION INTRAVENOUS; SUBCUTANEOUS at 16:55

## 2020-12-13 LAB
ANION GAP SERPL CALCULATED.3IONS-SCNC: 5 MMOL/L (ref 4–13)
BUN SERPL-MCNC: 26 MG/DL (ref 5–25)
CALCIUM SERPL-MCNC: 9 MG/DL (ref 8.3–10.1)
CHLORIDE SERPL-SCNC: 106 MMOL/L (ref 100–108)
CO2 SERPL-SCNC: 31 MMOL/L (ref 21–32)
CREAT SERPL-MCNC: 1.01 MG/DL (ref 0.6–1.3)
ERYTHROCYTE [DISTWIDTH] IN BLOOD BY AUTOMATED COUNT: 15 % (ref 11.6–15.1)
GFR SERPL CREATININE-BSD FRML MDRD: 69 ML/MIN/1.73SQ M
GLUCOSE SERPL-MCNC: 101 MG/DL (ref 65–140)
GLUCOSE SERPL-MCNC: 153 MG/DL (ref 65–140)
GLUCOSE SERPL-MCNC: 220 MG/DL (ref 65–140)
GLUCOSE SERPL-MCNC: 237 MG/DL (ref 65–140)
GLUCOSE SERPL-MCNC: 66 MG/DL (ref 65–140)
HCT VFR BLD AUTO: 33.4 % (ref 36.5–49.3)
HGB BLD-MCNC: 10.8 G/DL (ref 12–17)
MCH RBC QN AUTO: 31.5 PG (ref 26.8–34.3)
MCHC RBC AUTO-ENTMCNC: 32.3 G/DL (ref 31.4–37.4)
MCV RBC AUTO: 97 FL (ref 82–98)
PLATELET # BLD AUTO: 55 THOUSANDS/UL (ref 149–390)
PMV BLD AUTO: 11.9 FL (ref 8.9–12.7)
POTASSIUM SERPL-SCNC: 3.4 MMOL/L (ref 3.5–5.3)
RBC # BLD AUTO: 3.43 MILLION/UL (ref 3.88–5.62)
SODIUM SERPL-SCNC: 142 MMOL/L (ref 136–145)
WBC # BLD AUTO: 9.83 THOUSAND/UL (ref 4.31–10.16)

## 2020-12-13 PROCEDURE — 99232 SBSQ HOSP IP/OBS MODERATE 35: CPT | Performed by: INTERNAL MEDICINE

## 2020-12-13 PROCEDURE — 94664 DEMO&/EVAL PT USE INHALER: CPT

## 2020-12-13 PROCEDURE — 93018 CV STRESS TEST I&R ONLY: CPT | Performed by: INTERNAL MEDICINE

## 2020-12-13 PROCEDURE — 93016 CV STRESS TEST SUPVJ ONLY: CPT | Performed by: INTERNAL MEDICINE

## 2020-12-13 PROCEDURE — 87040 BLOOD CULTURE FOR BACTERIA: CPT | Performed by: INTERNAL MEDICINE

## 2020-12-13 PROCEDURE — 94668 MNPJ CHEST WALL SBSQ: CPT

## 2020-12-13 PROCEDURE — 80048 BASIC METABOLIC PNL TOTAL CA: CPT | Performed by: INTERNAL MEDICINE

## 2020-12-13 PROCEDURE — 78452 HT MUSCLE IMAGE SPECT MULT: CPT | Performed by: INTERNAL MEDICINE

## 2020-12-13 PROCEDURE — 82948 REAGENT STRIP/BLOOD GLUCOSE: CPT

## 2020-12-13 PROCEDURE — 94760 N-INVAS EAR/PLS OXIMETRY 1: CPT

## 2020-12-13 PROCEDURE — 85027 COMPLETE CBC AUTOMATED: CPT | Performed by: INTERNAL MEDICINE

## 2020-12-13 RX ORDER — POTASSIUM CHLORIDE 20 MEQ/1
20 TABLET, EXTENDED RELEASE ORAL 2 TIMES DAILY
Status: COMPLETED | OUTPATIENT
Start: 2020-12-13 | End: 2020-12-14

## 2020-12-13 RX ORDER — MIRTAZAPINE 15 MG/1
15 TABLET, FILM COATED ORAL
Status: DISCONTINUED | OUTPATIENT
Start: 2020-12-13 | End: 2020-12-27

## 2020-12-13 RX ADMIN — POTASSIUM CHLORIDE 20 MEQ: 1500 TABLET, EXTENDED RELEASE ORAL at 09:43

## 2020-12-13 RX ADMIN — INSULIN GLARGINE 5 UNITS: 100 INJECTION, SOLUTION SUBCUTANEOUS at 22:16

## 2020-12-13 RX ADMIN — DOCUSATE SODIUM AND SENNOSIDES 1 TABLET: 8.6; 5 TABLET ORAL at 21:55

## 2020-12-13 RX ADMIN — FINASTERIDE 5 MG: 5 TABLET, FILM COATED ORAL at 09:42

## 2020-12-13 RX ADMIN — INSULIN LISPRO 1 UNITS: 100 INJECTION, SOLUTION INTRAVENOUS; SUBCUTANEOUS at 11:44

## 2020-12-13 RX ADMIN — GUAIFENESIN 600 MG: 600 TABLET, EXTENDED RELEASE ORAL at 21:55

## 2020-12-13 RX ADMIN — APIXABAN 2.5 MG: 2.5 TABLET, FILM COATED ORAL at 09:42

## 2020-12-13 RX ADMIN — GUAIFENESIN 600 MG: 600 TABLET, EXTENDED RELEASE ORAL at 09:43

## 2020-12-13 RX ADMIN — APIXABAN 2.5 MG: 2.5 TABLET, FILM COATED ORAL at 17:02

## 2020-12-13 RX ADMIN — INSULIN LISPRO 2 UNITS: 100 INJECTION, SOLUTION INTRAVENOUS; SUBCUTANEOUS at 09:44

## 2020-12-13 RX ADMIN — POTASSIUM CHLORIDE 20 MEQ: 1500 TABLET, EXTENDED RELEASE ORAL at 17:02

## 2020-12-13 RX ADMIN — MEGESTROL ACETATE 400 MG: 40 SUSPENSION ORAL at 09:44

## 2020-12-13 RX ADMIN — CEFTRIAXONE SODIUM 2000 MG: 10 INJECTION, POWDER, FOR SOLUTION INTRAVENOUS at 09:42

## 2020-12-13 RX ADMIN — MIRTAZAPINE 15 MG: 15 TABLET, FILM COATED ORAL at 21:56

## 2020-12-13 RX ADMIN — POLYETHYLENE GLYCOL 3350 17 G: 17 POWDER, FOR SOLUTION ORAL at 09:42

## 2020-12-14 LAB
ANION GAP SERPL CALCULATED.3IONS-SCNC: 5 MMOL/L (ref 4–13)
BUN SERPL-MCNC: 23 MG/DL (ref 5–25)
CALCIUM SERPL-MCNC: 8.8 MG/DL (ref 8.3–10.1)
CHLORIDE SERPL-SCNC: 107 MMOL/L (ref 100–108)
CO2 SERPL-SCNC: 31 MMOL/L (ref 21–32)
CREAT SERPL-MCNC: 0.96 MG/DL (ref 0.6–1.3)
GFR SERPL CREATININE-BSD FRML MDRD: 74 ML/MIN/1.73SQ M
GLUCOSE SERPL-MCNC: 101 MG/DL (ref 65–140)
GLUCOSE SERPL-MCNC: 145 MG/DL (ref 65–140)
GLUCOSE SERPL-MCNC: 219 MG/DL (ref 65–140)
GLUCOSE SERPL-MCNC: 298 MG/DL (ref 65–140)
GLUCOSE SERPL-MCNC: 89 MG/DL (ref 65–140)
POTASSIUM SERPL-SCNC: 3.8 MMOL/L (ref 3.5–5.3)
PROCALCITONIN SERPL-MCNC: 1.67 NG/ML
SODIUM SERPL-SCNC: 143 MMOL/L (ref 136–145)

## 2020-12-14 PROCEDURE — 99232 SBSQ HOSP IP/OBS MODERATE 35: CPT | Performed by: INTERNAL MEDICINE

## 2020-12-14 PROCEDURE — 94760 N-INVAS EAR/PLS OXIMETRY 1: CPT

## 2020-12-14 PROCEDURE — 99231 SBSQ HOSP IP/OBS SF/LOW 25: CPT | Performed by: INTERNAL MEDICINE

## 2020-12-14 PROCEDURE — 82948 REAGENT STRIP/BLOOD GLUCOSE: CPT

## 2020-12-14 PROCEDURE — 80048 BASIC METABOLIC PNL TOTAL CA: CPT | Performed by: INTERNAL MEDICINE

## 2020-12-14 PROCEDURE — 84145 PROCALCITONIN (PCT): CPT | Performed by: INTERNAL MEDICINE

## 2020-12-14 PROCEDURE — 94664 DEMO&/EVAL PT USE INHALER: CPT

## 2020-12-14 PROCEDURE — 94668 MNPJ CHEST WALL SBSQ: CPT

## 2020-12-14 PROCEDURE — 94669 MECHANICAL CHEST WALL OSCILL: CPT

## 2020-12-14 RX ORDER — DOCUSATE SODIUM 100 MG/1
100 CAPSULE, LIQUID FILLED ORAL 2 TIMES DAILY
Status: DISCONTINUED | OUTPATIENT
Start: 2020-12-14 | End: 2020-12-20

## 2020-12-14 RX ORDER — SENNOSIDES 8.6 MG
2 TABLET ORAL
Status: DISCONTINUED | OUTPATIENT
Start: 2020-12-14 | End: 2020-12-20

## 2020-12-14 RX ADMIN — INSULIN LISPRO 2 UNITS: 100 INJECTION, SOLUTION INTRAVENOUS; SUBCUTANEOUS at 17:08

## 2020-12-14 RX ADMIN — INSULIN GLARGINE 5 UNITS: 100 INJECTION, SOLUTION SUBCUTANEOUS at 22:16

## 2020-12-14 RX ADMIN — POLYETHYLENE GLYCOL 3350 17 G: 17 POWDER, FOR SOLUTION ORAL at 09:54

## 2020-12-14 RX ADMIN — POTASSIUM CHLORIDE 20 MEQ: 1500 TABLET, EXTENDED RELEASE ORAL at 17:01

## 2020-12-14 RX ADMIN — INSULIN LISPRO 1 UNITS: 100 INJECTION, SOLUTION INTRAVENOUS; SUBCUTANEOUS at 11:15

## 2020-12-14 RX ADMIN — INSULIN LISPRO 2 UNITS: 100 INJECTION, SOLUTION INTRAVENOUS; SUBCUTANEOUS at 17:02

## 2020-12-14 RX ADMIN — MIRTAZAPINE 15 MG: 15 TABLET, FILM COATED ORAL at 22:17

## 2020-12-14 RX ADMIN — CEFTRIAXONE SODIUM 1000 MG: 10 INJECTION, POWDER, FOR SOLUTION INTRAVENOUS at 11:09

## 2020-12-14 RX ADMIN — FINASTERIDE 5 MG: 5 TABLET, FILM COATED ORAL at 09:54

## 2020-12-14 RX ADMIN — DOCUSATE SODIUM 100 MG: 100 CAPSULE ORAL at 17:04

## 2020-12-14 RX ADMIN — APIXABAN 2.5 MG: 2.5 TABLET, FILM COATED ORAL at 09:54

## 2020-12-14 RX ADMIN — SENNOSIDES 17.2 MG: 8.6 TABLET ORAL at 22:17

## 2020-12-14 RX ADMIN — APIXABAN 2.5 MG: 2.5 TABLET, FILM COATED ORAL at 17:01

## 2020-12-14 RX ADMIN — MEGESTROL ACETATE 400 MG: 40 SUSPENSION ORAL at 09:55

## 2020-12-14 RX ADMIN — POTASSIUM CHLORIDE 20 MEQ: 1500 TABLET, EXTENDED RELEASE ORAL at 09:54

## 2020-12-15 ENCOUNTER — TELEPHONE (OUTPATIENT)
Dept: GYNECOLOGIC ONCOLOGY | Facility: CLINIC | Age: 80
End: 2020-12-15

## 2020-12-15 LAB
GLUCOSE SERPL-MCNC: 120 MG/DL (ref 65–140)
GLUCOSE SERPL-MCNC: 150 MG/DL (ref 65–140)
GLUCOSE SERPL-MCNC: 175 MG/DL (ref 65–140)
GLUCOSE SERPL-MCNC: 80 MG/DL (ref 65–140)
PROCALCITONIN SERPL-MCNC: 0.77 NG/ML

## 2020-12-15 PROCEDURE — 97530 THERAPEUTIC ACTIVITIES: CPT

## 2020-12-15 PROCEDURE — 99231 SBSQ HOSP IP/OBS SF/LOW 25: CPT | Performed by: INTERNAL MEDICINE

## 2020-12-15 PROCEDURE — 99233 SBSQ HOSP IP/OBS HIGH 50: CPT | Performed by: INTERNAL MEDICINE

## 2020-12-15 PROCEDURE — 97112 NEUROMUSCULAR REEDUCATION: CPT

## 2020-12-15 PROCEDURE — 97116 GAIT TRAINING THERAPY: CPT

## 2020-12-15 PROCEDURE — 84145 PROCALCITONIN (PCT): CPT | Performed by: INTERNAL MEDICINE

## 2020-12-15 PROCEDURE — 82948 REAGENT STRIP/BLOOD GLUCOSE: CPT

## 2020-12-15 PROCEDURE — 94664 DEMO&/EVAL PT USE INHALER: CPT

## 2020-12-15 PROCEDURE — 97535 SELF CARE MNGMENT TRAINING: CPT

## 2020-12-15 PROCEDURE — 94668 MNPJ CHEST WALL SBSQ: CPT

## 2020-12-15 RX ADMIN — MEGESTROL ACETATE 400 MG: 40 SUSPENSION ORAL at 08:40

## 2020-12-15 RX ADMIN — CEFTRIAXONE SODIUM 1000 MG: 10 INJECTION, POWDER, FOR SOLUTION INTRAVENOUS at 10:16

## 2020-12-15 RX ADMIN — DOCUSATE SODIUM 100 MG: 100 CAPSULE ORAL at 08:39

## 2020-12-15 RX ADMIN — FINASTERIDE 5 MG: 5 TABLET, FILM COATED ORAL at 08:39

## 2020-12-15 RX ADMIN — INSULIN LISPRO 2 UNITS: 100 INJECTION, SOLUTION INTRAVENOUS; SUBCUTANEOUS at 08:40

## 2020-12-15 RX ADMIN — DOCUSATE SODIUM 100 MG: 100 CAPSULE ORAL at 17:58

## 2020-12-15 RX ADMIN — APIXABAN 2.5 MG: 2.5 TABLET, FILM COATED ORAL at 08:39

## 2020-12-15 RX ADMIN — INSULIN LISPRO 1 UNITS: 100 INJECTION, SOLUTION INTRAVENOUS; SUBCUTANEOUS at 11:52

## 2020-12-15 RX ADMIN — INSULIN LISPRO 1 UNITS: 100 INJECTION, SOLUTION INTRAVENOUS; SUBCUTANEOUS at 16:44

## 2020-12-15 RX ADMIN — MIRTAZAPINE 15 MG: 15 TABLET, FILM COATED ORAL at 22:43

## 2020-12-15 RX ADMIN — SENNOSIDES 17.2 MG: 8.6 TABLET ORAL at 22:43

## 2020-12-15 RX ADMIN — INSULIN LISPRO 2 UNITS: 100 INJECTION, SOLUTION INTRAVENOUS; SUBCUTANEOUS at 11:52

## 2020-12-15 RX ADMIN — INSULIN LISPRO 2 UNITS: 100 INJECTION, SOLUTION INTRAVENOUS; SUBCUTANEOUS at 16:44

## 2020-12-15 RX ADMIN — POLYETHYLENE GLYCOL 3350 17 G: 17 POWDER, FOR SOLUTION ORAL at 08:39

## 2020-12-15 RX ADMIN — INSULIN GLARGINE 5 UNITS: 100 INJECTION, SOLUTION SUBCUTANEOUS at 22:43

## 2020-12-15 RX ADMIN — APIXABAN 2.5 MG: 2.5 TABLET, FILM COATED ORAL at 17:58

## 2020-12-16 LAB
ANION GAP SERPL CALCULATED.3IONS-SCNC: 3 MMOL/L (ref 4–13)
ANISOCYTOSIS BLD QL SMEAR: PRESENT
BASOPHILS # BLD MANUAL: 0 THOUSAND/UL (ref 0–0.1)
BASOPHILS NFR MAR MANUAL: 0 % (ref 0–1)
BUN SERPL-MCNC: 23 MG/DL (ref 5–25)
CALCIUM SERPL-MCNC: 9.1 MG/DL (ref 8.3–10.1)
CHLORIDE SERPL-SCNC: 105 MMOL/L (ref 100–108)
CO2 SERPL-SCNC: 32 MMOL/L (ref 21–32)
CREAT SERPL-MCNC: 1 MG/DL (ref 0.6–1.3)
EOSINOPHIL # BLD MANUAL: 0 THOUSAND/UL (ref 0–0.4)
EOSINOPHIL NFR BLD MANUAL: 0 % (ref 0–6)
ERYTHROCYTE [DISTWIDTH] IN BLOOD BY AUTOMATED COUNT: 15.5 % (ref 11.6–15.1)
GFR SERPL CREATININE-BSD FRML MDRD: 70 ML/MIN/1.73SQ M
GLUCOSE SERPL-MCNC: 100 MG/DL (ref 65–140)
GLUCOSE SERPL-MCNC: 128 MG/DL (ref 65–140)
GLUCOSE SERPL-MCNC: 73 MG/DL (ref 65–140)
GLUCOSE SERPL-MCNC: 75 MG/DL (ref 65–140)
GLUCOSE SERPL-MCNC: 92 MG/DL (ref 65–140)
HCT VFR BLD AUTO: 33.8 % (ref 36.5–49.3)
HGB BLD-MCNC: 10.5 G/DL (ref 12–17)
LYMPHOCYTES # BLD AUTO: 0.64 THOUSAND/UL (ref 0.6–4.47)
LYMPHOCYTES # BLD AUTO: 6 % (ref 14–44)
MACROCYTES BLD QL AUTO: PRESENT
MCH RBC QN AUTO: 30.8 PG (ref 26.8–34.3)
MCHC RBC AUTO-ENTMCNC: 31.1 G/DL (ref 31.4–37.4)
MCV RBC AUTO: 99 FL (ref 82–98)
MONOCYTES # BLD AUTO: 0.32 THOUSAND/UL (ref 0–1.22)
MONOCYTES NFR BLD: 3 % (ref 4–12)
NEUTROPHILS # BLD MANUAL: 9.77 THOUSAND/UL (ref 1.85–7.62)
NEUTS SEG NFR BLD AUTO: 91 % (ref 43–75)
NRBC BLD AUTO-RTO: 0 /100 WBCS
PLATELET # BLD AUTO: 78 THOUSANDS/UL (ref 149–390)
PLATELET BLD QL SMEAR: ABNORMAL
PMV BLD AUTO: 11.5 FL (ref 8.9–12.7)
POIKILOCYTOSIS BLD QL SMEAR: PRESENT
POLYCHROMASIA BLD QL SMEAR: PRESENT
POTASSIUM SERPL-SCNC: 4.1 MMOL/L (ref 3.5–5.3)
RBC # BLD AUTO: 3.41 MILLION/UL (ref 3.88–5.62)
RBC MORPH BLD: PRESENT
SODIUM SERPL-SCNC: 140 MMOL/L (ref 136–145)
TARGETS BLD QL SMEAR: PRESENT
WBC # BLD AUTO: 10.74 THOUSAND/UL (ref 4.31–10.16)

## 2020-12-16 PROCEDURE — 82948 REAGENT STRIP/BLOOD GLUCOSE: CPT

## 2020-12-16 PROCEDURE — 99232 SBSQ HOSP IP/OBS MODERATE 35: CPT | Performed by: PHYSICIAN ASSISTANT

## 2020-12-16 PROCEDURE — 99231 SBSQ HOSP IP/OBS SF/LOW 25: CPT | Performed by: INTERNAL MEDICINE

## 2020-12-16 PROCEDURE — 80048 BASIC METABOLIC PNL TOTAL CA: CPT | Performed by: INTERNAL MEDICINE

## 2020-12-16 PROCEDURE — 85027 COMPLETE CBC AUTOMATED: CPT | Performed by: INTERNAL MEDICINE

## 2020-12-16 PROCEDURE — 85007 BL SMEAR W/DIFF WBC COUNT: CPT | Performed by: INTERNAL MEDICINE

## 2020-12-16 RX ADMIN — INSULIN LISPRO 2 UNITS: 100 INJECTION, SOLUTION INTRAVENOUS; SUBCUTANEOUS at 08:31

## 2020-12-16 RX ADMIN — APIXABAN 2.5 MG: 2.5 TABLET, FILM COATED ORAL at 08:32

## 2020-12-16 RX ADMIN — CEFTRIAXONE SODIUM 1000 MG: 10 INJECTION, POWDER, FOR SOLUTION INTRAVENOUS at 09:58

## 2020-12-16 RX ADMIN — SENNOSIDES 17.2 MG: 8.6 TABLET ORAL at 21:57

## 2020-12-16 RX ADMIN — INSULIN GLARGINE 5 UNITS: 100 INJECTION, SOLUTION SUBCUTANEOUS at 21:57

## 2020-12-16 RX ADMIN — FINASTERIDE 5 MG: 5 TABLET, FILM COATED ORAL at 08:32

## 2020-12-16 RX ADMIN — DOCUSATE SODIUM 100 MG: 100 CAPSULE ORAL at 17:18

## 2020-12-16 RX ADMIN — APIXABAN 2.5 MG: 2.5 TABLET, FILM COATED ORAL at 17:18

## 2020-12-16 RX ADMIN — DOCUSATE SODIUM 100 MG: 100 CAPSULE ORAL at 08:32

## 2020-12-16 RX ADMIN — MIRTAZAPINE 15 MG: 15 TABLET, FILM COATED ORAL at 21:57

## 2020-12-16 RX ADMIN — POLYETHYLENE GLYCOL 3350 17 G: 17 POWDER, FOR SOLUTION ORAL at 08:32

## 2020-12-16 RX ADMIN — MEGESTROL ACETATE 400 MG: 40 SUSPENSION ORAL at 08:32

## 2020-12-17 LAB
ANION GAP SERPL CALCULATED.3IONS-SCNC: 5 MMOL/L (ref 4–13)
BASOPHILS # BLD AUTO: 0.07 THOUSANDS/ΜL (ref 0–0.1)
BASOPHILS NFR BLD AUTO: 0 % (ref 0–1)
BUN SERPL-MCNC: 29 MG/DL (ref 5–25)
CALCIUM SERPL-MCNC: 9.4 MG/DL (ref 8.3–10.1)
CHLORIDE SERPL-SCNC: 105 MMOL/L (ref 100–108)
CO2 SERPL-SCNC: 30 MMOL/L (ref 21–32)
CREAT SERPL-MCNC: 1.43 MG/DL (ref 0.6–1.3)
EOSINOPHIL # BLD AUTO: 0.02 THOUSAND/ΜL (ref 0–0.61)
EOSINOPHIL NFR BLD AUTO: 0 % (ref 0–6)
ERYTHROCYTE [DISTWIDTH] IN BLOOD BY AUTOMATED COUNT: 15.7 % (ref 11.6–15.1)
GFR SERPL CREATININE-BSD FRML MDRD: 46 ML/MIN/1.73SQ M
GLUCOSE SERPL-MCNC: 100 MG/DL (ref 65–140)
GLUCOSE SERPL-MCNC: 125 MG/DL (ref 65–140)
GLUCOSE SERPL-MCNC: 152 MG/DL (ref 65–140)
GLUCOSE SERPL-MCNC: 208 MG/DL (ref 65–140)
GLUCOSE SERPL-MCNC: 88 MG/DL (ref 65–140)
HCT VFR BLD AUTO: 38.4 % (ref 36.5–49.3)
HGB BLD-MCNC: 12.1 G/DL (ref 12–17)
IMM GRANULOCYTES # BLD AUTO: >0.5 THOUSAND/UL (ref 0–0.2)
IMM GRANULOCYTES NFR BLD AUTO: 3 % (ref 0–2)
LYMPHOCYTES # BLD AUTO: 1.16 THOUSANDS/ΜL (ref 0.6–4.47)
LYMPHOCYTES NFR BLD AUTO: 7 % (ref 14–44)
MCH RBC QN AUTO: 31.2 PG (ref 26.8–34.3)
MCHC RBC AUTO-ENTMCNC: 31.5 G/DL (ref 31.4–37.4)
MCV RBC AUTO: 99 FL (ref 82–98)
MONOCYTES # BLD AUTO: 1.24 THOUSAND/ΜL (ref 0.17–1.22)
MONOCYTES NFR BLD AUTO: 7 % (ref 4–12)
NEUTROPHILS # BLD AUTO: 14.44 THOUSANDS/ΜL (ref 1.85–7.62)
NEUTS SEG NFR BLD AUTO: 83 % (ref 43–75)
NRBC BLD AUTO-RTO: 0 /100 WBCS
PLATELET # BLD AUTO: 143 THOUSANDS/UL (ref 149–390)
PMV BLD AUTO: 10.8 FL (ref 8.9–12.7)
POTASSIUM SERPL-SCNC: 4.3 MMOL/L (ref 3.5–5.3)
RBC # BLD AUTO: 3.88 MILLION/UL (ref 3.88–5.62)
SODIUM SERPL-SCNC: 140 MMOL/L (ref 136–145)
WBC # BLD AUTO: 17.53 THOUSAND/UL (ref 4.31–10.16)

## 2020-12-17 PROCEDURE — 82948 REAGENT STRIP/BLOOD GLUCOSE: CPT

## 2020-12-17 PROCEDURE — 92610 EVALUATE SWALLOWING FUNCTION: CPT

## 2020-12-17 PROCEDURE — 97535 SELF CARE MNGMENT TRAINING: CPT

## 2020-12-17 PROCEDURE — 99233 SBSQ HOSP IP/OBS HIGH 50: CPT | Performed by: INTERNAL MEDICINE

## 2020-12-17 PROCEDURE — 85027 COMPLETE CBC AUTOMATED: CPT | Performed by: INTERNAL MEDICINE

## 2020-12-17 PROCEDURE — 99232 SBSQ HOSP IP/OBS MODERATE 35: CPT | Performed by: INTERNAL MEDICINE

## 2020-12-17 PROCEDURE — 80048 BASIC METABOLIC PNL TOTAL CA: CPT | Performed by: INTERNAL MEDICINE

## 2020-12-17 RX ORDER — DEXTROSE AND SODIUM CHLORIDE 5; .9 G/100ML; G/100ML
100 INJECTION, SOLUTION INTRAVENOUS CONTINUOUS
Status: DISCONTINUED | OUTPATIENT
Start: 2020-12-17 | End: 2020-12-20

## 2020-12-17 RX ORDER — SODIUM CHLORIDE 9 MG/ML
75 INJECTION, SOLUTION INTRAVENOUS CONTINUOUS
Status: DISCONTINUED | OUTPATIENT
Start: 2020-12-17 | End: 2020-12-17

## 2020-12-17 RX ADMIN — SENNOSIDES 17.2 MG: 8.6 TABLET ORAL at 22:14

## 2020-12-17 RX ADMIN — APIXABAN 2.5 MG: 2.5 TABLET, FILM COATED ORAL at 17:17

## 2020-12-17 RX ADMIN — DEXTROSE AND SODIUM CHLORIDE 75 ML/HR: 5; .9 INJECTION, SOLUTION INTRAVENOUS at 13:36

## 2020-12-17 RX ADMIN — INSULIN LISPRO 1 UNITS: 100 INJECTION, SOLUTION INTRAVENOUS; SUBCUTANEOUS at 11:31

## 2020-12-17 RX ADMIN — APIXABAN 2.5 MG: 2.5 TABLET, FILM COATED ORAL at 08:33

## 2020-12-17 RX ADMIN — FINASTERIDE 5 MG: 5 TABLET, FILM COATED ORAL at 08:35

## 2020-12-17 RX ADMIN — INSULIN GLARGINE 5 UNITS: 100 INJECTION, SOLUTION SUBCUTANEOUS at 22:20

## 2020-12-17 RX ADMIN — DOCUSATE SODIUM 100 MG: 100 CAPSULE ORAL at 08:33

## 2020-12-17 RX ADMIN — MEGESTROL ACETATE 400 MG: 40 SUSPENSION ORAL at 08:34

## 2020-12-17 RX ADMIN — MIRTAZAPINE 15 MG: 15 TABLET, FILM COATED ORAL at 22:14

## 2020-12-17 RX ADMIN — POLYETHYLENE GLYCOL 3350 17 G: 17 POWDER, FOR SOLUTION ORAL at 08:32

## 2020-12-17 RX ADMIN — DOCUSATE SODIUM 100 MG: 100 CAPSULE ORAL at 17:17

## 2020-12-18 ENCOUNTER — APPOINTMENT (INPATIENT)
Dept: RADIOLOGY | Facility: HOSPITAL | Age: 80
DRG: 871 | End: 2020-12-18
Payer: MEDICARE

## 2020-12-18 PROBLEM — L89.152 PRESSURE INJURY OF SACRAL REGION, STAGE 2 (HCC): Status: ACTIVE | Noted: 2020-12-09

## 2020-12-18 PROBLEM — L89.152 PRESSURE INJURY OF SACRAL REGION, STAGE 2 (HCC): Status: ACTIVE | Noted: 2020-12-18

## 2020-12-18 LAB
ANION GAP SERPL CALCULATED.3IONS-SCNC: 5 MMOL/L (ref 4–13)
BACTERIA BLD CULT: NORMAL
BACTERIA BLD CULT: NORMAL
BACTERIA UR QL AUTO: ABNORMAL /HPF
BASOPHILS # BLD AUTO: 0.06 THOUSANDS/ΜL (ref 0–0.1)
BASOPHILS NFR BLD AUTO: 0 % (ref 0–1)
BILIRUB UR QL STRIP: NEGATIVE
BUN SERPL-MCNC: 25 MG/DL (ref 5–25)
CALCIUM SERPL-MCNC: 8.3 MG/DL (ref 8.3–10.1)
CHLORIDE SERPL-SCNC: 110 MMOL/L (ref 100–108)
CLARITY UR: CLEAR
CO2 SERPL-SCNC: 29 MMOL/L (ref 21–32)
COLOR UR: ABNORMAL
CREAT SERPL-MCNC: 1.25 MG/DL (ref 0.6–1.3)
EOSINOPHIL # BLD AUTO: 0 THOUSAND/ΜL (ref 0–0.61)
EOSINOPHIL NFR BLD AUTO: 0 % (ref 0–6)
ERYTHROCYTE [DISTWIDTH] IN BLOOD BY AUTOMATED COUNT: 15.4 % (ref 11.6–15.1)
GFR SERPL CREATININE-BSD FRML MDRD: 54 ML/MIN/1.73SQ M
GLUCOSE SERPL-MCNC: 116 MG/DL (ref 65–140)
GLUCOSE SERPL-MCNC: 149 MG/DL (ref 65–140)
GLUCOSE SERPL-MCNC: 158 MG/DL (ref 65–140)
GLUCOSE SERPL-MCNC: 164 MG/DL (ref 65–140)
GLUCOSE SERPL-MCNC: 204 MG/DL (ref 65–140)
GLUCOSE SERPL-MCNC: 208 MG/DL (ref 65–140)
GLUCOSE UR STRIP-MCNC: ABNORMAL MG/DL
HCT VFR BLD AUTO: 31.1 % (ref 36.5–49.3)
HGB BLD-MCNC: 9.7 G/DL (ref 12–17)
HGB UR QL STRIP.AUTO: NEGATIVE
HYALINE CASTS #/AREA URNS LPF: ABNORMAL /LPF
IMM GRANULOCYTES # BLD AUTO: 0.25 THOUSAND/UL (ref 0–0.2)
IMM GRANULOCYTES NFR BLD AUTO: 2 % (ref 0–2)
KETONES UR STRIP-MCNC: NEGATIVE MG/DL
LEUKOCYTE ESTERASE UR QL STRIP: NEGATIVE
LYMPHOCYTES # BLD AUTO: 0.42 THOUSANDS/ΜL (ref 0.6–4.47)
LYMPHOCYTES NFR BLD AUTO: 3 % (ref 14–44)
MCH RBC QN AUTO: 31.1 PG (ref 26.8–34.3)
MCHC RBC AUTO-ENTMCNC: 31.2 G/DL (ref 31.4–37.4)
MCV RBC AUTO: 100 FL (ref 82–98)
MONOCYTES # BLD AUTO: 1.11 THOUSAND/ΜL (ref 0.17–1.22)
MONOCYTES NFR BLD AUTO: 7 % (ref 4–12)
NEUTROPHILS # BLD AUTO: 14.08 THOUSANDS/ΜL (ref 1.85–7.62)
NEUTS SEG NFR BLD AUTO: 88 % (ref 43–75)
NITRITE UR QL STRIP: NEGATIVE
NON-SQ EPI CELLS URNS QL MICRO: ABNORMAL /HPF
NRBC BLD AUTO-RTO: 0 /100 WBCS
PH UR STRIP.AUTO: 5.5 [PH]
PLATELET # BLD AUTO: 119 THOUSANDS/UL (ref 149–390)
PMV BLD AUTO: 10.7 FL (ref 8.9–12.7)
POTASSIUM SERPL-SCNC: 4 MMOL/L (ref 3.5–5.3)
PROT UR STRIP-MCNC: ABNORMAL MG/DL
RBC # BLD AUTO: 3.12 MILLION/UL (ref 3.88–5.62)
RBC #/AREA URNS AUTO: ABNORMAL /HPF
SODIUM SERPL-SCNC: 144 MMOL/L (ref 136–145)
SP GR UR STRIP.AUTO: 1.02 (ref 1–1.03)
UROBILINOGEN UR QL STRIP.AUTO: 0.2 E.U./DL
WBC # BLD AUTO: 15.92 THOUSAND/UL (ref 4.31–10.16)
WBC #/AREA URNS AUTO: ABNORMAL /HPF

## 2020-12-18 PROCEDURE — 92611 MOTION FLUOROSCOPY/SWALLOW: CPT

## 2020-12-18 PROCEDURE — 99231 SBSQ HOSP IP/OBS SF/LOW 25: CPT | Performed by: INTERNAL MEDICINE

## 2020-12-18 PROCEDURE — 82948 REAGENT STRIP/BLOOD GLUCOSE: CPT

## 2020-12-18 PROCEDURE — 99221 1ST HOSP IP/OBS SF/LOW 40: CPT | Performed by: OTOLARYNGOLOGY

## 2020-12-18 PROCEDURE — 92526 ORAL FUNCTION THERAPY: CPT

## 2020-12-18 PROCEDURE — 92614 LARYNGOSCOPIC SENSORY VID: CPT | Performed by: OTOLARYNGOLOGY

## 2020-12-18 PROCEDURE — 81001 URINALYSIS AUTO W/SCOPE: CPT | Performed by: INTERNAL MEDICINE

## 2020-12-18 PROCEDURE — 99233 SBSQ HOSP IP/OBS HIGH 50: CPT | Performed by: INTERNAL MEDICINE

## 2020-12-18 PROCEDURE — 85025 COMPLETE CBC W/AUTO DIFF WBC: CPT | Performed by: INTERNAL MEDICINE

## 2020-12-18 PROCEDURE — 80048 BASIC METABOLIC PNL TOTAL CA: CPT | Performed by: INTERNAL MEDICINE

## 2020-12-18 PROCEDURE — 74230 X-RAY XM SWLNG FUNCJ C+: CPT

## 2020-12-18 RX ORDER — MIDODRINE HYDROCHLORIDE 5 MG/1
5 TABLET ORAL
Status: DISCONTINUED | OUTPATIENT
Start: 2020-12-18 | End: 2020-12-28

## 2020-12-18 RX ORDER — DEXTROSE AND SODIUM CHLORIDE 5; .9 G/100ML; G/100ML
500 INJECTION, SOLUTION INTRAVENOUS ONCE
Status: COMPLETED | OUTPATIENT
Start: 2020-12-18 | End: 2020-12-18

## 2020-12-18 RX ADMIN — MIDODRINE HYDROCHLORIDE 5 MG: 5 TABLET ORAL at 23:48

## 2020-12-18 RX ADMIN — DEXTROSE AND SODIUM CHLORIDE 100 ML/HR: 5; .9 INJECTION, SOLUTION INTRAVENOUS at 22:42

## 2020-12-18 RX ADMIN — INSULIN LISPRO 1 UNITS: 100 INJECTION, SOLUTION INTRAVENOUS; SUBCUTANEOUS at 11:51

## 2020-12-18 RX ADMIN — DEXTROSE AND SODIUM CHLORIDE 500 ML: 5; .9 INJECTION, SOLUTION INTRAVENOUS at 11:15

## 2020-12-18 RX ADMIN — INSULIN LISPRO 2 UNITS: 100 INJECTION, SOLUTION INTRAVENOUS; SUBCUTANEOUS at 17:01

## 2020-12-18 RX ADMIN — DEXTROSE AND SODIUM CHLORIDE 100 ML/HR: 5; .9 INJECTION, SOLUTION INTRAVENOUS at 01:45

## 2020-12-18 RX ADMIN — IRON SUCROSE 200 MG: 20 INJECTION, SOLUTION INTRAVENOUS at 17:02

## 2020-12-18 RX ADMIN — SODIUM CHLORIDE 1000 ML: 0.9 INJECTION, SOLUTION INTRAVENOUS at 23:48

## 2020-12-18 RX ADMIN — INSULIN GLARGINE 5 UNITS: 100 INJECTION, SOLUTION SUBCUTANEOUS at 22:41

## 2020-12-18 RX ADMIN — APIXABAN 2.5 MG: 2.5 TABLET, FILM COATED ORAL at 17:02

## 2020-12-18 RX ADMIN — SENNOSIDES 17.2 MG: 8.6 TABLET ORAL at 22:41

## 2020-12-18 RX ADMIN — INSULIN LISPRO 1 UNITS: 100 INJECTION, SOLUTION INTRAVENOUS; SUBCUTANEOUS at 09:02

## 2020-12-18 RX ADMIN — MIRTAZAPINE 15 MG: 15 TABLET, FILM COATED ORAL at 22:41

## 2020-12-19 LAB
ANION GAP SERPL CALCULATED.3IONS-SCNC: 2 MMOL/L (ref 4–13)
BASOPHILS # BLD AUTO: 0.02 THOUSANDS/ΜL (ref 0–0.1)
BASOPHILS NFR BLD AUTO: 0 % (ref 0–1)
BUN SERPL-MCNC: 17 MG/DL (ref 5–25)
CALCIUM SERPL-MCNC: 8.4 MG/DL (ref 8.3–10.1)
CHLORIDE SERPL-SCNC: 112 MMOL/L (ref 100–108)
CO2 SERPL-SCNC: 30 MMOL/L (ref 21–32)
CREAT SERPL-MCNC: 0.89 MG/DL (ref 0.6–1.3)
EOSINOPHIL # BLD AUTO: 0.09 THOUSAND/ΜL (ref 0–0.61)
EOSINOPHIL NFR BLD AUTO: 1 % (ref 0–6)
ERYTHROCYTE [DISTWIDTH] IN BLOOD BY AUTOMATED COUNT: 15.6 % (ref 11.6–15.1)
GFR SERPL CREATININE-BSD FRML MDRD: 80 ML/MIN/1.73SQ M
GLUCOSE SERPL-MCNC: 100 MG/DL (ref 65–140)
GLUCOSE SERPL-MCNC: 189 MG/DL (ref 65–140)
GLUCOSE SERPL-MCNC: 74 MG/DL (ref 65–140)
GLUCOSE SERPL-MCNC: 97 MG/DL (ref 65–140)
GLUCOSE SERPL-MCNC: 99 MG/DL (ref 65–140)
HCT VFR BLD AUTO: 31.1 % (ref 36.5–49.3)
HGB BLD-MCNC: 9.7 G/DL (ref 12–17)
IMM GRANULOCYTES # BLD AUTO: 0.23 THOUSAND/UL (ref 0–0.2)
IMM GRANULOCYTES NFR BLD AUTO: 2 % (ref 0–2)
LYMPHOCYTES # BLD AUTO: 0.59 THOUSANDS/ΜL (ref 0.6–4.47)
LYMPHOCYTES NFR BLD AUTO: 4 % (ref 14–44)
MCH RBC QN AUTO: 31.3 PG (ref 26.8–34.3)
MCHC RBC AUTO-ENTMCNC: 31.2 G/DL (ref 31.4–37.4)
MCV RBC AUTO: 100 FL (ref 82–98)
MONOCYTES # BLD AUTO: 1 THOUSAND/ΜL (ref 0.17–1.22)
MONOCYTES NFR BLD AUTO: 7 % (ref 4–12)
NEUTROPHILS # BLD AUTO: 12.43 THOUSANDS/ΜL (ref 1.85–7.62)
NEUTS SEG NFR BLD AUTO: 86 % (ref 43–75)
NRBC BLD AUTO-RTO: 0 /100 WBCS
PLATELET # BLD AUTO: 135 THOUSANDS/UL (ref 149–390)
PMV BLD AUTO: 10.9 FL (ref 8.9–12.7)
POTASSIUM SERPL-SCNC: 3.7 MMOL/L (ref 3.5–5.3)
RBC # BLD AUTO: 3.1 MILLION/UL (ref 3.88–5.62)
SODIUM SERPL-SCNC: 144 MMOL/L (ref 136–145)
WBC # BLD AUTO: 14.36 THOUSAND/UL (ref 4.31–10.16)

## 2020-12-19 PROCEDURE — 85025 COMPLETE CBC W/AUTO DIFF WBC: CPT | Performed by: INTERNAL MEDICINE

## 2020-12-19 PROCEDURE — 82948 REAGENT STRIP/BLOOD GLUCOSE: CPT

## 2020-12-19 PROCEDURE — 99233 SBSQ HOSP IP/OBS HIGH 50: CPT | Performed by: INTERNAL MEDICINE

## 2020-12-19 PROCEDURE — 99231 SBSQ HOSP IP/OBS SF/LOW 25: CPT | Performed by: INTERNAL MEDICINE

## 2020-12-19 PROCEDURE — 80048 BASIC METABOLIC PNL TOTAL CA: CPT | Performed by: INTERNAL MEDICINE

## 2020-12-19 PROCEDURE — 99222 1ST HOSP IP/OBS MODERATE 55: CPT | Performed by: INTERNAL MEDICINE

## 2020-12-19 RX ADMIN — INSULIN LISPRO 2 UNITS: 100 INJECTION, SOLUTION INTRAVENOUS; SUBCUTANEOUS at 08:30

## 2020-12-19 RX ADMIN — POLYETHYLENE GLYCOL 3350 17 G: 17 POWDER, FOR SOLUTION ORAL at 08:29

## 2020-12-19 RX ADMIN — MIDODRINE HYDROCHLORIDE 5 MG: 5 TABLET ORAL at 16:26

## 2020-12-19 RX ADMIN — DOCUSATE SODIUM 100 MG: 100 CAPSULE ORAL at 16:26

## 2020-12-19 RX ADMIN — INSULIN LISPRO 1 UNITS: 100 INJECTION, SOLUTION INTRAVENOUS; SUBCUTANEOUS at 16:27

## 2020-12-19 RX ADMIN — IRON SUCROSE 200 MG: 20 INJECTION, SOLUTION INTRAVENOUS at 08:40

## 2020-12-19 RX ADMIN — INSULIN LISPRO 2 UNITS: 100 INJECTION, SOLUTION INTRAVENOUS; SUBCUTANEOUS at 16:27

## 2020-12-19 RX ADMIN — MEGESTROL ACETATE 400 MG: 40 SUSPENSION ORAL at 08:30

## 2020-12-19 RX ADMIN — MIRTAZAPINE 15 MG: 15 TABLET, FILM COATED ORAL at 22:16

## 2020-12-19 RX ADMIN — DEXTROSE AND SODIUM CHLORIDE 100 ML/HR: 5; .9 INJECTION, SOLUTION INTRAVENOUS at 22:17

## 2020-12-19 RX ADMIN — APIXABAN 2.5 MG: 2.5 TABLET, FILM COATED ORAL at 16:26

## 2020-12-19 RX ADMIN — DEXTROSE AND SODIUM CHLORIDE 100 ML/HR: 5; .9 INJECTION, SOLUTION INTRAVENOUS at 12:32

## 2020-12-19 RX ADMIN — SENNOSIDES 17.2 MG: 8.6 TABLET ORAL at 22:16

## 2020-12-19 RX ADMIN — FINASTERIDE 5 MG: 5 TABLET, FILM COATED ORAL at 08:29

## 2020-12-19 RX ADMIN — MIDODRINE HYDROCHLORIDE 5 MG: 5 TABLET ORAL at 11:45

## 2020-12-19 RX ADMIN — APIXABAN 2.5 MG: 2.5 TABLET, FILM COATED ORAL at 08:29

## 2020-12-19 RX ADMIN — MIDODRINE HYDROCHLORIDE 5 MG: 5 TABLET ORAL at 08:29

## 2020-12-19 RX ADMIN — INSULIN GLARGINE 5 UNITS: 100 INJECTION, SOLUTION SUBCUTANEOUS at 22:16

## 2020-12-20 ENCOUNTER — APPOINTMENT (INPATIENT)
Dept: RADIOLOGY | Facility: HOSPITAL | Age: 80
DRG: 871 | End: 2020-12-20
Payer: MEDICARE

## 2020-12-20 LAB
GLUCOSE SERPL-MCNC: 100 MG/DL (ref 65–140)
GLUCOSE SERPL-MCNC: 145 MG/DL (ref 65–140)
GLUCOSE SERPL-MCNC: 147 MG/DL (ref 65–140)
GLUCOSE SERPL-MCNC: 68 MG/DL (ref 65–140)
GLUCOSE SERPL-MCNC: 73 MG/DL (ref 65–140)

## 2020-12-20 PROCEDURE — 94640 AIRWAY INHALATION TREATMENT: CPT

## 2020-12-20 PROCEDURE — 99232 SBSQ HOSP IP/OBS MODERATE 35: CPT | Performed by: INTERNAL MEDICINE

## 2020-12-20 PROCEDURE — 94760 N-INVAS EAR/PLS OXIMETRY 1: CPT

## 2020-12-20 PROCEDURE — 82948 REAGENT STRIP/BLOOD GLUCOSE: CPT

## 2020-12-20 PROCEDURE — 71045 X-RAY EXAM CHEST 1 VIEW: CPT

## 2020-12-20 PROCEDURE — 99231 SBSQ HOSP IP/OBS SF/LOW 25: CPT | Performed by: INTERNAL MEDICINE

## 2020-12-20 PROCEDURE — 94669 MECHANICAL CHEST WALL OSCILL: CPT

## 2020-12-20 PROCEDURE — 94668 MNPJ CHEST WALL SBSQ: CPT

## 2020-12-20 RX ORDER — GUAIFENESIN 100 MG/5ML
200 SOLUTION ORAL EVERY 4 HOURS PRN
Status: DISCONTINUED | OUTPATIENT
Start: 2020-12-20 | End: 2020-12-22

## 2020-12-20 RX ORDER — FUROSEMIDE 20 MG/1
20 TABLET ORAL ONCE
Status: COMPLETED | OUTPATIENT
Start: 2020-12-20 | End: 2020-12-20

## 2020-12-20 RX ORDER — SENNOSIDES 8.8 MG/5ML
8.8 LIQUID ORAL
Status: DISCONTINUED | OUTPATIENT
Start: 2020-12-20 | End: 2020-12-28

## 2020-12-20 RX ORDER — LEVALBUTEROL 1.25 MG/.5ML
1.25 SOLUTION, CONCENTRATE RESPIRATORY (INHALATION)
Status: DISCONTINUED | OUTPATIENT
Start: 2020-12-20 | End: 2020-12-20

## 2020-12-20 RX ORDER — LEVALBUTEROL INHALATION SOLUTION 0.63 MG/3ML
0.63 SOLUTION RESPIRATORY (INHALATION) EVERY 8 HOURS PRN
Status: DISCONTINUED | OUTPATIENT
Start: 2020-12-20 | End: 2020-12-20

## 2020-12-20 RX ORDER — DEXTROSE AND SODIUM CHLORIDE 5; .9 G/100ML; G/100ML
35 INJECTION, SOLUTION INTRAVENOUS CONTINUOUS
Status: DISCONTINUED | OUTPATIENT
Start: 2020-12-20 | End: 2020-12-24

## 2020-12-20 RX ADMIN — INSULIN GLARGINE 5 UNITS: 100 INJECTION, SOLUTION SUBCUTANEOUS at 22:01

## 2020-12-20 RX ADMIN — IRON SUCROSE 200 MG: 20 INJECTION, SOLUTION INTRAVENOUS at 08:02

## 2020-12-20 RX ADMIN — DEXTROSE AND SODIUM CHLORIDE 100 ML/HR: 5; .9 INJECTION, SOLUTION INTRAVENOUS at 08:12

## 2020-12-20 RX ADMIN — LEVALBUTEROL HYDROCHLORIDE 1.25 MG: 1.25 SOLUTION, CONCENTRATE RESPIRATORY (INHALATION) at 14:08

## 2020-12-20 RX ADMIN — FUROSEMIDE 20 MG: 20 TABLET ORAL at 11:25

## 2020-12-20 RX ADMIN — SENNOSIDES 8.8 MG: 8.8 SYRUP ORAL at 22:01

## 2020-12-20 RX ADMIN — GUAIFENESIN 200 MG: 100 SOLUTION ORAL at 11:25

## 2020-12-20 RX ADMIN — MIDODRINE HYDROCHLORIDE 5 MG: 5 TABLET ORAL at 06:08

## 2020-12-20 RX ADMIN — MIRTAZAPINE 15 MG: 15 TABLET, FILM COATED ORAL at 22:01

## 2020-12-20 RX ADMIN — GUAIFENESIN 200 MG: 100 SOLUTION ORAL at 16:50

## 2020-12-20 RX ADMIN — MIDODRINE HYDROCHLORIDE 5 MG: 5 TABLET ORAL at 16:50

## 2020-12-20 RX ADMIN — MEGESTROL ACETATE 400 MG: 40 SUSPENSION ORAL at 08:03

## 2020-12-20 RX ADMIN — DEXTROSE AND SODIUM CHLORIDE 35 ML/HR: 5; .9 INJECTION, SOLUTION INTRAVENOUS at 11:28

## 2020-12-20 RX ADMIN — BISACODYL 10 MG: 10 SUPPOSITORY RECTAL at 08:15

## 2020-12-20 RX ADMIN — INSULIN LISPRO 2 UNITS: 100 INJECTION, SOLUTION INTRAVENOUS; SUBCUTANEOUS at 08:03

## 2020-12-20 RX ADMIN — POLYETHYLENE GLYCOL 3350 17 G: 17 POWDER, FOR SOLUTION ORAL at 08:02

## 2020-12-20 RX ADMIN — MIDODRINE HYDROCHLORIDE 5 MG: 5 TABLET ORAL at 11:25

## 2020-12-20 RX ADMIN — APIXABAN 2.5 MG: 2.5 TABLET, FILM COATED ORAL at 16:50

## 2020-12-20 RX ADMIN — IPRATROPIUM BROMIDE 0.5 MG: 0.5 SOLUTION RESPIRATORY (INHALATION) at 14:08

## 2020-12-21 LAB
ANION GAP SERPL CALCULATED.3IONS-SCNC: 2 MMOL/L (ref 4–13)
BASOPHILS # BLD AUTO: 0.08 THOUSANDS/ΜL (ref 0–0.1)
BASOPHILS NFR BLD AUTO: 1 % (ref 0–1)
BUN SERPL-MCNC: 13 MG/DL (ref 5–25)
CALCIUM SERPL-MCNC: 8.7 MG/DL (ref 8.3–10.1)
CHLORIDE SERPL-SCNC: 107 MMOL/L (ref 100–108)
CO2 SERPL-SCNC: 33 MMOL/L (ref 21–32)
CREAT SERPL-MCNC: 1.01 MG/DL (ref 0.6–1.3)
EOSINOPHIL # BLD AUTO: 0.05 THOUSAND/ΜL (ref 0–0.61)
EOSINOPHIL NFR BLD AUTO: 0 % (ref 0–6)
ERYTHROCYTE [DISTWIDTH] IN BLOOD BY AUTOMATED COUNT: 15.6 % (ref 11.6–15.1)
GFR SERPL CREATININE-BSD FRML MDRD: 69 ML/MIN/1.73SQ M
GLUCOSE SERPL-MCNC: 113 MG/DL (ref 65–140)
GLUCOSE SERPL-MCNC: 205 MG/DL (ref 65–140)
GLUCOSE SERPL-MCNC: 215 MG/DL (ref 65–140)
GLUCOSE SERPL-MCNC: 76 MG/DL (ref 65–140)
GLUCOSE SERPL-MCNC: 84 MG/DL (ref 65–140)
HCT VFR BLD AUTO: 30.8 % (ref 36.5–49.3)
HGB BLD-MCNC: 9.7 G/DL (ref 12–17)
IMM GRANULOCYTES # BLD AUTO: 0.39 THOUSAND/UL (ref 0–0.2)
IMM GRANULOCYTES NFR BLD AUTO: 2 % (ref 0–2)
LYMPHOCYTES # BLD AUTO: 0.68 THOUSANDS/ΜL (ref 0.6–4.47)
LYMPHOCYTES NFR BLD AUTO: 4 % (ref 14–44)
MCH RBC QN AUTO: 31.6 PG (ref 26.8–34.3)
MCHC RBC AUTO-ENTMCNC: 31.5 G/DL (ref 31.4–37.4)
MCV RBC AUTO: 100 FL (ref 82–98)
MONOCYTES # BLD AUTO: 1.47 THOUSAND/ΜL (ref 0.17–1.22)
MONOCYTES NFR BLD AUTO: 9 % (ref 4–12)
NEUTROPHILS # BLD AUTO: 14.31 THOUSANDS/ΜL (ref 1.85–7.62)
NEUTS SEG NFR BLD AUTO: 84 % (ref 43–75)
NRBC BLD AUTO-RTO: 0 /100 WBCS
PLATELET # BLD AUTO: 173 THOUSANDS/UL (ref 149–390)
PMV BLD AUTO: 10.5 FL (ref 8.9–12.7)
POTASSIUM SERPL-SCNC: 4 MMOL/L (ref 3.5–5.3)
RBC # BLD AUTO: 3.07 MILLION/UL (ref 3.88–5.62)
SODIUM SERPL-SCNC: 142 MMOL/L (ref 136–145)
WBC # BLD AUTO: 16.98 THOUSAND/UL (ref 4.31–10.16)

## 2020-12-21 PROCEDURE — 99222 1ST HOSP IP/OBS MODERATE 55: CPT | Performed by: INTERNAL MEDICINE

## 2020-12-21 PROCEDURE — 80048 BASIC METABOLIC PNL TOTAL CA: CPT | Performed by: INTERNAL MEDICINE

## 2020-12-21 PROCEDURE — 85025 COMPLETE CBC W/AUTO DIFF WBC: CPT | Performed by: INTERNAL MEDICINE

## 2020-12-21 PROCEDURE — 99232 SBSQ HOSP IP/OBS MODERATE 35: CPT | Performed by: INTERNAL MEDICINE

## 2020-12-21 PROCEDURE — 94664 DEMO&/EVAL PT USE INHALER: CPT

## 2020-12-21 PROCEDURE — 94760 N-INVAS EAR/PLS OXIMETRY 1: CPT

## 2020-12-21 PROCEDURE — 82948 REAGENT STRIP/BLOOD GLUCOSE: CPT

## 2020-12-21 PROCEDURE — 99231 SBSQ HOSP IP/OBS SF/LOW 25: CPT | Performed by: INTERNAL MEDICINE

## 2020-12-21 PROCEDURE — 94668 MNPJ CHEST WALL SBSQ: CPT

## 2020-12-21 PROCEDURE — 92526 ORAL FUNCTION THERAPY: CPT

## 2020-12-21 PROCEDURE — 94669 MECHANICAL CHEST WALL OSCILL: CPT

## 2020-12-21 RX ORDER — ACETAMINOPHEN 325 MG/1
650 TABLET ORAL EVERY 6 HOURS PRN
Status: DISCONTINUED | OUTPATIENT
Start: 2020-12-21 | End: 2020-12-28

## 2020-12-21 RX ADMIN — MIDODRINE HYDROCHLORIDE 5 MG: 5 TABLET ORAL at 12:07

## 2020-12-21 RX ADMIN — MIDODRINE HYDROCHLORIDE 5 MG: 5 TABLET ORAL at 06:07

## 2020-12-21 RX ADMIN — MIDODRINE HYDROCHLORIDE 5 MG: 5 TABLET ORAL at 17:42

## 2020-12-21 RX ADMIN — POLYETHYLENE GLYCOL 3350 17 G: 17 POWDER, FOR SOLUTION ORAL at 09:18

## 2020-12-21 RX ADMIN — APIXABAN 2.5 MG: 2.5 TABLET, FILM COATED ORAL at 17:42

## 2020-12-21 RX ADMIN — MIRTAZAPINE 15 MG: 15 TABLET, FILM COATED ORAL at 21:36

## 2020-12-21 RX ADMIN — SENNOSIDES 8.8 MG: 8.8 SYRUP ORAL at 21:36

## 2020-12-21 RX ADMIN — DEXTROSE AND SODIUM CHLORIDE 35 ML/HR: 5; .9 INJECTION, SOLUTION INTRAVENOUS at 12:12

## 2020-12-21 RX ADMIN — APIXABAN 2.5 MG: 2.5 TABLET, FILM COATED ORAL at 09:18

## 2020-12-21 RX ADMIN — MEGESTROL ACETATE 400 MG: 40 SUSPENSION ORAL at 09:19

## 2020-12-21 RX ADMIN — GUAIFENESIN 200 MG: 100 SOLUTION ORAL at 09:18

## 2020-12-21 RX ADMIN — INSULIN LISPRO 2 UNITS: 100 INJECTION, SOLUTION INTRAVENOUS; SUBCUTANEOUS at 11:58

## 2020-12-21 RX ADMIN — INSULIN GLARGINE 5 UNITS: 100 INJECTION, SOLUTION SUBCUTANEOUS at 21:36

## 2020-12-21 RX ADMIN — FINASTERIDE 5 MG: 5 TABLET, FILM COATED ORAL at 09:18

## 2020-12-22 ENCOUNTER — APPOINTMENT (INPATIENT)
Dept: RADIOLOGY | Facility: HOSPITAL | Age: 80
DRG: 871 | End: 2020-12-22
Attending: INTERNAL MEDICINE
Payer: MEDICARE

## 2020-12-22 LAB
GLUCOSE SERPL-MCNC: 111 MG/DL (ref 65–140)
GLUCOSE SERPL-MCNC: 112 MG/DL (ref 65–140)
GLUCOSE SERPL-MCNC: 236 MG/DL (ref 65–140)
GLUCOSE SERPL-MCNC: 89 MG/DL (ref 65–140)

## 2020-12-22 PROCEDURE — 94760 N-INVAS EAR/PLS OXIMETRY 1: CPT

## 2020-12-22 PROCEDURE — 71045 X-RAY EXAM CHEST 1 VIEW: CPT

## 2020-12-22 PROCEDURE — 99231 SBSQ HOSP IP/OBS SF/LOW 25: CPT | Performed by: INTERNAL MEDICINE

## 2020-12-22 PROCEDURE — 99233 SBSQ HOSP IP/OBS HIGH 50: CPT | Performed by: INTERNAL MEDICINE

## 2020-12-22 PROCEDURE — 92526 ORAL FUNCTION THERAPY: CPT

## 2020-12-22 PROCEDURE — 94664 DEMO&/EVAL PT USE INHALER: CPT

## 2020-12-22 PROCEDURE — 99222 1ST HOSP IP/OBS MODERATE 55: CPT | Performed by: SURGERY

## 2020-12-22 PROCEDURE — 82948 REAGENT STRIP/BLOOD GLUCOSE: CPT

## 2020-12-22 PROCEDURE — 94668 MNPJ CHEST WALL SBSQ: CPT

## 2020-12-22 PROCEDURE — 99232 SBSQ HOSP IP/OBS MODERATE 35: CPT | Performed by: INTERNAL MEDICINE

## 2020-12-22 RX ORDER — GUAIFENESIN 100 MG/5ML
400 SOLUTION ORAL
Status: DISCONTINUED | OUTPATIENT
Start: 2020-12-22 | End: 2020-12-28

## 2020-12-22 RX ADMIN — MEGESTROL ACETATE 400 MG: 40 SUSPENSION ORAL at 09:17

## 2020-12-22 RX ADMIN — MIDODRINE HYDROCHLORIDE 5 MG: 5 TABLET ORAL at 16:45

## 2020-12-22 RX ADMIN — SENNOSIDES 8.8 MG: 8.8 SYRUP ORAL at 22:29

## 2020-12-22 RX ADMIN — INSULIN GLARGINE 5 UNITS: 100 INJECTION, SOLUTION SUBCUTANEOUS at 22:28

## 2020-12-22 RX ADMIN — DEXTROSE AND SODIUM CHLORIDE 35 ML/HR: 5; .9 INJECTION, SOLUTION INTRAVENOUS at 16:46

## 2020-12-22 RX ADMIN — MIDODRINE HYDROCHLORIDE 5 MG: 5 TABLET ORAL at 12:12

## 2020-12-22 RX ADMIN — MIDODRINE HYDROCHLORIDE 5 MG: 5 TABLET ORAL at 06:03

## 2020-12-22 RX ADMIN — FINASTERIDE 5 MG: 5 TABLET, FILM COATED ORAL at 09:21

## 2020-12-22 RX ADMIN — MIRTAZAPINE 15 MG: 15 TABLET, FILM COATED ORAL at 22:29

## 2020-12-22 RX ADMIN — APIXABAN 2.5 MG: 2.5 TABLET, FILM COATED ORAL at 09:21

## 2020-12-22 RX ADMIN — GUAIFENESIN 400 MG: 100 SOLUTION ORAL at 16:46

## 2020-12-22 RX ADMIN — INSULIN LISPRO 2 UNITS: 100 INJECTION, SOLUTION INTRAVENOUS; SUBCUTANEOUS at 12:11

## 2020-12-22 RX ADMIN — POLYETHYLENE GLYCOL 3350 17 G: 17 POWDER, FOR SOLUTION ORAL at 09:21

## 2020-12-23 ENCOUNTER — ANESTHESIA EVENT (INPATIENT)
Dept: PERIOP | Facility: HOSPITAL | Age: 80
DRG: 871 | End: 2020-12-23
Payer: MEDICARE

## 2020-12-23 PROBLEM — C26.9 GI MALIGNANCY (HCC): Status: ACTIVE | Noted: 2020-12-23

## 2020-12-23 PROBLEM — D64.9 ANEMIA: Status: ACTIVE | Noted: 2020-12-23

## 2020-12-23 PROBLEM — R13.10 DYSPHAGIA: Status: ACTIVE | Noted: 2020-12-23

## 2020-12-23 LAB
ANION GAP SERPL CALCULATED.3IONS-SCNC: 3 MMOL/L (ref 4–13)
ANISOCYTOSIS BLD QL SMEAR: PRESENT
BASOPHILS # BLD MANUAL: 0 THOUSAND/UL (ref 0–0.1)
BASOPHILS NFR MAR MANUAL: 0 % (ref 0–1)
BUN SERPL-MCNC: 17 MG/DL (ref 5–25)
CALCIUM SERPL-MCNC: 8.8 MG/DL (ref 8.3–10.1)
CHLORIDE SERPL-SCNC: 106 MMOL/L (ref 100–108)
CO2 SERPL-SCNC: 34 MMOL/L (ref 21–32)
CREAT SERPL-MCNC: 0.92 MG/DL (ref 0.6–1.3)
EOSINOPHIL # BLD MANUAL: 0 THOUSAND/UL (ref 0–0.4)
EOSINOPHIL NFR BLD MANUAL: 0 % (ref 0–6)
ERYTHROCYTE [DISTWIDTH] IN BLOOD BY AUTOMATED COUNT: 15.2 % (ref 11.6–15.1)
GFR SERPL CREATININE-BSD FRML MDRD: 78 ML/MIN/1.73SQ M
GIANT PLATELETS BLD QL SMEAR: PRESENT
GLUCOSE SERPL-MCNC: 142 MG/DL (ref 65–140)
GLUCOSE SERPL-MCNC: 155 MG/DL (ref 65–140)
GLUCOSE SERPL-MCNC: 83 MG/DL (ref 65–140)
HCT VFR BLD AUTO: 32.9 % (ref 36.5–49.3)
HGB BLD-MCNC: 10.3 G/DL (ref 12–17)
LYMPHOCYTES # BLD AUTO: 0.28 THOUSAND/UL (ref 0.6–4.47)
LYMPHOCYTES # BLD AUTO: 2 % (ref 14–44)
MCH RBC QN AUTO: 30.7 PG (ref 26.8–34.3)
MCHC RBC AUTO-ENTMCNC: 31.3 G/DL (ref 31.4–37.4)
MCV RBC AUTO: 98 FL (ref 82–98)
MONOCYTES # BLD AUTO: 0.71 THOUSAND/UL (ref 0–1.22)
MONOCYTES NFR BLD: 5 % (ref 4–12)
NEUTROPHILS # BLD MANUAL: 12.14 THOUSAND/UL (ref 1.85–7.62)
NEUTS BAND NFR BLD MANUAL: 8 % (ref 0–8)
NEUTS SEG NFR BLD AUTO: 78 % (ref 43–75)
NRBC BLD AUTO-RTO: 0 /100 WBCS
PLATELET # BLD AUTO: 183 THOUSANDS/UL (ref 149–390)
PLATELET BLD QL SMEAR: ADEQUATE
PMV BLD AUTO: 10.2 FL (ref 8.9–12.7)
POIKILOCYTOSIS BLD QL SMEAR: PRESENT
POLYCHROMASIA BLD QL SMEAR: PRESENT
POTASSIUM SERPL-SCNC: 4.1 MMOL/L (ref 3.5–5.3)
PREALB SERPL-MCNC: <3 MG/DL (ref 18–40)
RBC # BLD AUTO: 3.35 MILLION/UL (ref 3.88–5.62)
RBC MORPH BLD: PRESENT
SODIUM SERPL-SCNC: 143 MMOL/L (ref 136–145)
VARIANT LYMPHS # BLD AUTO: 7 %
WBC # BLD AUTO: 14.12 THOUSAND/UL (ref 4.31–10.16)

## 2020-12-23 PROCEDURE — 94668 MNPJ CHEST WALL SBSQ: CPT

## 2020-12-23 PROCEDURE — 97530 THERAPEUTIC ACTIVITIES: CPT

## 2020-12-23 PROCEDURE — 85027 COMPLETE CBC AUTOMATED: CPT | Performed by: PHYSICIAN ASSISTANT

## 2020-12-23 PROCEDURE — 99232 SBSQ HOSP IP/OBS MODERATE 35: CPT | Performed by: INTERNAL MEDICINE

## 2020-12-23 PROCEDURE — 97535 SELF CARE MNGMENT TRAINING: CPT

## 2020-12-23 PROCEDURE — 85007 BL SMEAR W/DIFF WBC COUNT: CPT | Performed by: PHYSICIAN ASSISTANT

## 2020-12-23 PROCEDURE — 84134 ASSAY OF PREALBUMIN: CPT | Performed by: PHYSICIAN ASSISTANT

## 2020-12-23 PROCEDURE — 94760 N-INVAS EAR/PLS OXIMETRY 1: CPT

## 2020-12-23 PROCEDURE — 94669 MECHANICAL CHEST WALL OSCILL: CPT

## 2020-12-23 PROCEDURE — 80048 BASIC METABOLIC PNL TOTAL CA: CPT | Performed by: PHYSICIAN ASSISTANT

## 2020-12-23 PROCEDURE — 94664 DEMO&/EVAL PT USE INHALER: CPT

## 2020-12-23 PROCEDURE — 99232 SBSQ HOSP IP/OBS MODERATE 35: CPT | Performed by: SURGERY

## 2020-12-23 PROCEDURE — 99231 SBSQ HOSP IP/OBS SF/LOW 25: CPT | Performed by: INTERNAL MEDICINE

## 2020-12-23 PROCEDURE — 82948 REAGENT STRIP/BLOOD GLUCOSE: CPT

## 2020-12-23 RX ORDER — SODIUM CHLORIDE, SODIUM LACTATE, POTASSIUM CHLORIDE, CALCIUM CHLORIDE 600; 310; 30; 20 MG/100ML; MG/100ML; MG/100ML; MG/100ML
75 INJECTION, SOLUTION INTRAVENOUS CONTINUOUS
Status: DISCONTINUED | OUTPATIENT
Start: 2020-12-24 | End: 2020-12-25

## 2020-12-23 RX ORDER — GUAIFENESIN 100 MG/5ML
200 SOLUTION ORAL EVERY 4 HOURS PRN
Status: DISCONTINUED | OUTPATIENT
Start: 2020-12-23 | End: 2020-12-28

## 2020-12-23 RX ORDER — ONDANSETRON 2 MG/ML
4 INJECTION INTRAMUSCULAR; INTRAVENOUS EVERY 8 HOURS PRN
Status: DISCONTINUED | OUTPATIENT
Start: 2020-12-23 | End: 2020-12-29 | Stop reason: HOSPADM

## 2020-12-23 RX ADMIN — ENOXAPARIN SODIUM 40 MG: 40 INJECTION SUBCUTANEOUS at 10:00

## 2020-12-23 RX ADMIN — GUAIFENESIN 400 MG: 100 SOLUTION ORAL at 18:30

## 2020-12-23 RX ADMIN — POLYETHYLENE GLYCOL 3350 17 G: 17 POWDER, FOR SOLUTION ORAL at 09:59

## 2020-12-23 RX ADMIN — MIDODRINE HYDROCHLORIDE 5 MG: 5 TABLET ORAL at 18:30

## 2020-12-23 RX ADMIN — MIDODRINE HYDROCHLORIDE 5 MG: 5 TABLET ORAL at 06:04

## 2020-12-23 RX ADMIN — INSULIN LISPRO 2 UNITS: 100 INJECTION, SOLUTION INTRAVENOUS; SUBCUTANEOUS at 16:45

## 2020-12-23 RX ADMIN — INSULIN GLARGINE 5 UNITS: 100 INJECTION, SOLUTION SUBCUTANEOUS at 22:35

## 2020-12-23 RX ADMIN — INSULIN LISPRO 1 UNITS: 100 INJECTION, SOLUTION INTRAVENOUS; SUBCUTANEOUS at 16:45

## 2020-12-23 RX ADMIN — MIRTAZAPINE 15 MG: 15 TABLET, FILM COATED ORAL at 22:35

## 2020-12-23 RX ADMIN — SENNOSIDES 8.8 MG: 8.8 SYRUP ORAL at 22:35

## 2020-12-23 RX ADMIN — MIDODRINE HYDROCHLORIDE 5 MG: 5 TABLET ORAL at 12:03

## 2020-12-23 RX ADMIN — GUAIFENESIN 400 MG: 100 SOLUTION ORAL at 09:59

## 2020-12-23 RX ADMIN — FINASTERIDE 5 MG: 5 TABLET, FILM COATED ORAL at 09:59

## 2020-12-24 ENCOUNTER — ANESTHESIA (INPATIENT)
Dept: PERIOP | Facility: HOSPITAL | Age: 80
DRG: 871 | End: 2020-12-24
Payer: MEDICARE

## 2020-12-24 VITALS — HEART RATE: 100 BPM

## 2020-12-24 PROBLEM — R63.6 UNDERWEIGHT: Status: ACTIVE | Noted: 2020-12-24

## 2020-12-24 PROBLEM — I21.9 MI (MYOCARDIAL INFARCTION) (HCC): Status: ACTIVE | Noted: 2020-12-24

## 2020-12-24 PROBLEM — Z98.61 HISTORY OF PTCA: Status: ACTIVE | Noted: 2020-12-24

## 2020-12-24 LAB
ANION GAP SERPL CALCULATED.3IONS-SCNC: 2 MMOL/L (ref 4–13)
BUN SERPL-MCNC: 16 MG/DL (ref 5–25)
CALCIUM SERPL-MCNC: 9.2 MG/DL (ref 8.3–10.1)
CHLORIDE SERPL-SCNC: 106 MMOL/L (ref 100–108)
CO2 SERPL-SCNC: 33 MMOL/L (ref 21–32)
CREAT SERPL-MCNC: 0.92 MG/DL (ref 0.6–1.3)
ERYTHROCYTE [DISTWIDTH] IN BLOOD BY AUTOMATED COUNT: 15.3 % (ref 11.6–15.1)
GFR SERPL CREATININE-BSD FRML MDRD: 78 ML/MIN/1.73SQ M
GLUCOSE SERPL-MCNC: 111 MG/DL (ref 65–140)
GLUCOSE SERPL-MCNC: 114 MG/DL (ref 65–140)
GLUCOSE SERPL-MCNC: 37 MG/DL (ref 65–140)
GLUCOSE SERPL-MCNC: 40 MG/DL (ref 65–140)
GLUCOSE SERPL-MCNC: 56 MG/DL (ref 65–140)
GLUCOSE SERPL-MCNC: 59 MG/DL (ref 65–140)
GLUCOSE SERPL-MCNC: 76 MG/DL (ref 65–140)
GLUCOSE SERPL-MCNC: 78 MG/DL (ref 65–140)
GLUCOSE SERPL-MCNC: 94 MG/DL (ref 65–140)
HCT VFR BLD AUTO: 32.6 % (ref 36.5–49.3)
HGB BLD-MCNC: 10.2 G/DL (ref 12–17)
MAGNESIUM SERPL-MCNC: 1.6 MG/DL (ref 1.6–2.6)
MCH RBC QN AUTO: 30.8 PG (ref 26.8–34.3)
MCHC RBC AUTO-ENTMCNC: 31.3 G/DL (ref 31.4–37.4)
MCV RBC AUTO: 99 FL (ref 82–98)
PHOSPHATE SERPL-MCNC: 2.9 MG/DL (ref 2.3–4.1)
PLATELET # BLD AUTO: 194 THOUSANDS/UL (ref 149–390)
PMV BLD AUTO: 10.3 FL (ref 8.9–12.7)
POTASSIUM SERPL-SCNC: 4 MMOL/L (ref 3.5–5.3)
RBC # BLD AUTO: 3.31 MILLION/UL (ref 3.88–5.62)
SODIUM SERPL-SCNC: 141 MMOL/L (ref 136–145)
WBC # BLD AUTO: 11.75 THOUSAND/UL (ref 4.31–10.16)

## 2020-12-24 PROCEDURE — 85027 COMPLETE CBC AUTOMATED: CPT | Performed by: INTERNAL MEDICINE

## 2020-12-24 PROCEDURE — 99233 SBSQ HOSP IP/OBS HIGH 50: CPT | Performed by: GENERAL PRACTICE

## 2020-12-24 PROCEDURE — 84100 ASSAY OF PHOSPHORUS: CPT | Performed by: INTERNAL MEDICINE

## 2020-12-24 PROCEDURE — 82948 REAGENT STRIP/BLOOD GLUCOSE: CPT

## 2020-12-24 PROCEDURE — G9197 ORDER FOR CEPH: HCPCS | Performed by: SURGERY

## 2020-12-24 PROCEDURE — 3E0H76Z INTRODUCTION OF NUTRITIONAL SUBSTANCE INTO LOWER GI, VIA NATURAL OR ARTIFICIAL OPENING: ICD-10-PCS | Performed by: INTERNAL MEDICINE

## 2020-12-24 PROCEDURE — 99024 POSTOP FOLLOW-UP VISIT: CPT | Performed by: SURGERY

## 2020-12-24 PROCEDURE — 44300 OPEN BOWEL TO SKIN: CPT | Performed by: SURGERY

## 2020-12-24 PROCEDURE — 83735 ASSAY OF MAGNESIUM: CPT | Performed by: INTERNAL MEDICINE

## 2020-12-24 PROCEDURE — 80048 BASIC METABOLIC PNL TOTAL CA: CPT | Performed by: INTERNAL MEDICINE

## 2020-12-24 PROCEDURE — 99232 SBSQ HOSP IP/OBS MODERATE 35: CPT | Performed by: INTERNAL MEDICINE

## 2020-12-24 PROCEDURE — 0DHA0UZ INSERTION OF FEEDING DEVICE INTO JEJUNUM, OPEN APPROACH: ICD-10-PCS | Performed by: INTERNAL MEDICINE

## 2020-12-24 RX ORDER — MAGNESIUM HYDROXIDE 1200 MG/15ML
LIQUID ORAL AS NEEDED
Status: DISCONTINUED | OUTPATIENT
Start: 2020-12-24 | End: 2020-12-24 | Stop reason: HOSPADM

## 2020-12-24 RX ORDER — BUPIVACAINE HYDROCHLORIDE 2.5 MG/ML
INJECTION, SOLUTION EPIDURAL; INFILTRATION; INTRACAUDAL AS NEEDED
Status: DISCONTINUED | OUTPATIENT
Start: 2020-12-24 | End: 2020-12-24 | Stop reason: HOSPADM

## 2020-12-24 RX ORDER — GLYCOPYRROLATE 0.2 MG/ML
INJECTION INTRAMUSCULAR; INTRAVENOUS AS NEEDED
Status: DISCONTINUED | OUTPATIENT
Start: 2020-12-24 | End: 2020-12-24

## 2020-12-24 RX ORDER — SUCCINYLCHOLINE/SOD CL,ISO/PF 100 MG/5ML
SYRINGE (ML) INTRAVENOUS AS NEEDED
Status: DISCONTINUED | OUTPATIENT
Start: 2020-12-24 | End: 2020-12-24

## 2020-12-24 RX ORDER — NEOSTIGMINE METHYLSULFATE 1 MG/ML
INJECTION INTRAVENOUS AS NEEDED
Status: DISCONTINUED | OUTPATIENT
Start: 2020-12-24 | End: 2020-12-24

## 2020-12-24 RX ORDER — DEXTROSE MONOHYDRATE 25 G/50ML
INJECTION, SOLUTION INTRAVENOUS
Status: DISPENSED
Start: 2020-12-24 | End: 2020-12-25

## 2020-12-24 RX ORDER — ONDANSETRON 2 MG/ML
INJECTION INTRAMUSCULAR; INTRAVENOUS AS NEEDED
Status: DISCONTINUED | OUTPATIENT
Start: 2020-12-24 | End: 2020-12-24

## 2020-12-24 RX ORDER — DEXTROSE MONOHYDRATE 25 G/50ML
INJECTION, SOLUTION INTRAVENOUS AS NEEDED
Status: DISCONTINUED | OUTPATIENT
Start: 2020-12-24 | End: 2020-12-24

## 2020-12-24 RX ORDER — ROCURONIUM BROMIDE 10 MG/ML
INJECTION, SOLUTION INTRAVENOUS AS NEEDED
Status: DISCONTINUED | OUTPATIENT
Start: 2020-12-24 | End: 2020-12-24

## 2020-12-24 RX ORDER — SODIUM CHLORIDE, SODIUM LACTATE, POTASSIUM CHLORIDE, CALCIUM CHLORIDE 600; 310; 30; 20 MG/100ML; MG/100ML; MG/100ML; MG/100ML
INJECTION, SOLUTION INTRAVENOUS CONTINUOUS PRN
Status: DISCONTINUED | OUTPATIENT
Start: 2020-12-24 | End: 2020-12-24

## 2020-12-24 RX ORDER — ONDANSETRON 2 MG/ML
4 INJECTION INTRAMUSCULAR; INTRAVENOUS ONCE AS NEEDED
Status: DISCONTINUED | OUTPATIENT
Start: 2020-12-24 | End: 2020-12-24 | Stop reason: HOSPADM

## 2020-12-24 RX ORDER — FENTANYL CITRATE 50 UG/ML
INJECTION, SOLUTION INTRAMUSCULAR; INTRAVENOUS AS NEEDED
Status: DISCONTINUED | OUTPATIENT
Start: 2020-12-24 | End: 2020-12-24

## 2020-12-24 RX ORDER — CEFAZOLIN SODIUM 1 G/3ML
INJECTION, POWDER, FOR SOLUTION INTRAMUSCULAR; INTRAVENOUS AS NEEDED
Status: DISCONTINUED | OUTPATIENT
Start: 2020-12-24 | End: 2020-12-24

## 2020-12-24 RX ORDER — PROPOFOL 10 MG/ML
INJECTION, EMULSION INTRAVENOUS AS NEEDED
Status: DISCONTINUED | OUTPATIENT
Start: 2020-12-24 | End: 2020-12-24

## 2020-12-24 RX ORDER — SODIUM CHLORIDE, SODIUM LACTATE, POTASSIUM CHLORIDE, CALCIUM CHLORIDE 600; 310; 30; 20 MG/100ML; MG/100ML; MG/100ML; MG/100ML
75 INJECTION, SOLUTION INTRAVENOUS CONTINUOUS
Status: DISCONTINUED | OUTPATIENT
Start: 2020-12-24 | End: 2020-12-25

## 2020-12-24 RX ORDER — DEXTROSE MONOHYDRATE 25 G/50ML
25 INJECTION, SOLUTION INTRAVENOUS ONCE
Status: COMPLETED | OUTPATIENT
Start: 2020-12-24 | End: 2020-12-24

## 2020-12-24 RX ORDER — LIDOCAINE HYDROCHLORIDE 10 MG/ML
INJECTION, SOLUTION EPIDURAL; INFILTRATION; INTRACAUDAL; PERINEURAL AS NEEDED
Status: DISCONTINUED | OUTPATIENT
Start: 2020-12-24 | End: 2020-12-24

## 2020-12-24 RX ORDER — FENTANYL CITRATE/PF 50 MCG/ML
25 SYRINGE (ML) INJECTION
Status: DISCONTINUED | OUTPATIENT
Start: 2020-12-24 | End: 2020-12-24 | Stop reason: HOSPADM

## 2020-12-24 RX ADMIN — Medication 25 MCG: at 09:16

## 2020-12-24 RX ADMIN — FINASTERIDE 5 MG: 5 TABLET, FILM COATED ORAL at 10:38

## 2020-12-24 RX ADMIN — MIRTAZAPINE 15 MG: 15 TABLET, FILM COATED ORAL at 21:15

## 2020-12-24 RX ADMIN — SENNOSIDES 8.8 MG: 8.8 SYRUP ORAL at 21:15

## 2020-12-24 RX ADMIN — Medication 25 MCG: at 09:24

## 2020-12-24 RX ADMIN — Medication 60 MG: at 07:41

## 2020-12-24 RX ADMIN — Medication 25 MCG: at 09:43

## 2020-12-24 RX ADMIN — LIDOCAINE HYDROCHLORIDE 50 MG: 10 INJECTION, SOLUTION EPIDURAL; INFILTRATION; INTRACAUDAL; PERINEURAL at 07:40

## 2020-12-24 RX ADMIN — POLYETHYLENE GLYCOL 3350 17 G: 17 POWDER, FOR SOLUTION ORAL at 10:38

## 2020-12-24 RX ADMIN — FENTANYL CITRATE 25 MCG: 50 INJECTION INTRAMUSCULAR; INTRAVENOUS at 08:04

## 2020-12-24 RX ADMIN — SODIUM CHLORIDE, SODIUM LACTATE, POTASSIUM CHLORIDE, AND CALCIUM CHLORIDE 75 ML/HR: .6; .31; .03; .02 INJECTION, SOLUTION INTRAVENOUS at 21:52

## 2020-12-24 RX ADMIN — CEFAZOLIN 1000 MG: 1 INJECTION, POWDER, FOR SOLUTION INTRAVENOUS at 07:49

## 2020-12-24 RX ADMIN — PHENYLEPHRINE HYDROCHLORIDE 200 MCG: 10 INJECTION INTRAVENOUS at 07:55

## 2020-12-24 RX ADMIN — GUAIFENESIN 400 MG: 100 SOLUTION ORAL at 10:39

## 2020-12-24 RX ADMIN — PHENYLEPHRINE HYDROCHLORIDE 200 MCG: 10 INJECTION INTRAVENOUS at 07:45

## 2020-12-24 RX ADMIN — Medication 25 MCG: at 09:36

## 2020-12-24 RX ADMIN — ROCURONIUM BROMIDE 10 MG: 50 INJECTION, SOLUTION INTRAVENOUS at 08:03

## 2020-12-24 RX ADMIN — ONDANSETRON 4 MG: 2 INJECTION INTRAMUSCULAR; INTRAVENOUS at 08:25

## 2020-12-24 RX ADMIN — SODIUM CHLORIDE, SODIUM LACTATE, POTASSIUM CHLORIDE, AND CALCIUM CHLORIDE 75 ML/HR: .6; .31; .03; .02 INJECTION, SOLUTION INTRAVENOUS at 00:06

## 2020-12-24 RX ADMIN — GUAIFENESIN 400 MG: 100 SOLUTION ORAL at 13:49

## 2020-12-24 RX ADMIN — ACETAMINOPHEN 650 MG: 325 TABLET, FILM COATED ORAL at 11:19

## 2020-12-24 RX ADMIN — NEOSTIGMINE METHYLSULFATE 2 MG: 1 INJECTION INTRAVENOUS at 08:36

## 2020-12-24 RX ADMIN — GLYCOPYRROLATE 0.2 MG: 0.2 INJECTION, SOLUTION INTRAMUSCULAR; INTRAVENOUS at 08:36

## 2020-12-24 RX ADMIN — DEXTROSE MONOHYDRATE 12.5 G: 500 INJECTION PARENTERAL at 07:22

## 2020-12-24 RX ADMIN — FENTANYL CITRATE 25 MCG: 50 INJECTION INTRAMUSCULAR; INTRAVENOUS at 07:59

## 2020-12-24 RX ADMIN — PROPOFOL 100 MG: 10 INJECTION, EMULSION INTRAVENOUS at 07:40

## 2020-12-24 RX ADMIN — SODIUM CHLORIDE, SODIUM LACTATE, POTASSIUM CHLORIDE, AND CALCIUM CHLORIDE: .6; .31; .03; .02 INJECTION, SOLUTION INTRAVENOUS at 07:29

## 2020-12-24 RX ADMIN — MIDODRINE HYDROCHLORIDE 5 MG: 5 TABLET ORAL at 16:47

## 2020-12-24 RX ADMIN — ENOXAPARIN SODIUM 40 MG: 40 INJECTION SUBCUTANEOUS at 10:38

## 2020-12-24 RX ADMIN — GUAIFENESIN 400 MG: 100 SOLUTION ORAL at 16:47

## 2020-12-24 RX ADMIN — DEXTROSE MONOHYDRATE 25 ML: 500 INJECTION PARENTERAL at 16:32

## 2020-12-24 RX ADMIN — PHENYLEPHRINE HYDROCHLORIDE 200 MCG: 10 INJECTION INTRAVENOUS at 08:06

## 2020-12-24 RX ADMIN — PHENYLEPHRINE HYDROCHLORIDE 200 MCG: 10 INJECTION INTRAVENOUS at 07:54

## 2020-12-24 RX ADMIN — MIDODRINE HYDROCHLORIDE 5 MG: 5 TABLET ORAL at 10:37

## 2020-12-24 RX ADMIN — PHENYLEPHRINE HYDROCHLORIDE 100 MCG: 10 INJECTION INTRAVENOUS at 07:59

## 2020-12-25 ENCOUNTER — APPOINTMENT (INPATIENT)
Dept: RADIOLOGY | Facility: HOSPITAL | Age: 80
DRG: 871 | End: 2020-12-25
Payer: MEDICARE

## 2020-12-25 PROBLEM — J96.01 ACUTE RESPIRATORY FAILURE WITH HYPOXIA (HCC): Status: ACTIVE | Noted: 2020-12-25

## 2020-12-25 LAB
ANION GAP SERPL CALCULATED.3IONS-SCNC: 2 MMOL/L (ref 4–13)
BASE EXCESS BLDA CALC-SCNC: 7.5 MMOL/L
BUN SERPL-MCNC: 15 MG/DL (ref 5–25)
CALCIUM SERPL-MCNC: 9.1 MG/DL (ref 8.3–10.1)
CHLORIDE SERPL-SCNC: 102 MMOL/L (ref 100–108)
CO2 SERPL-SCNC: 35 MMOL/L (ref 21–32)
CREAT SERPL-MCNC: 1.04 MG/DL (ref 0.6–1.3)
ERYTHROCYTE [DISTWIDTH] IN BLOOD BY AUTOMATED COUNT: 15.4 % (ref 11.6–15.1)
FLUAV RNA RESP QL NAA+PROBE: NEGATIVE
FLUBV RNA RESP QL NAA+PROBE: NEGATIVE
GFR SERPL CREATININE-BSD FRML MDRD: 67 ML/MIN/1.73SQ M
GLUCOSE SERPL-MCNC: 104 MG/DL (ref 65–140)
GLUCOSE SERPL-MCNC: 57 MG/DL (ref 65–140)
GLUCOSE SERPL-MCNC: 65 MG/DL (ref 65–140)
GLUCOSE SERPL-MCNC: 70 MG/DL (ref 65–140)
GLUCOSE SERPL-MCNC: 75 MG/DL (ref 65–140)
GLUCOSE SERPL-MCNC: 94 MG/DL (ref 65–140)
GLUCOSE SERPL-MCNC: 97 MG/DL (ref 65–140)
HCO3 BLDA-SCNC: 33.7 MMOL/L (ref 22–28)
HCT VFR BLD AUTO: 29.5 % (ref 36.5–49.3)
HFNC FLOW LPM: 55
HGB BLD-MCNC: 9.1 G/DL (ref 12–17)
MAGNESIUM SERPL-MCNC: 1.6 MG/DL (ref 1.6–2.6)
MCH RBC QN AUTO: 31.3 PG (ref 26.8–34.3)
MCHC RBC AUTO-ENTMCNC: 30.8 G/DL (ref 31.4–37.4)
MCV RBC AUTO: 101 FL (ref 82–98)
NON VENT HFNC FIO2: 85
NON VENT TYPE HFNC: ABNORMAL
O2 CT BLDA-SCNC: 13.7 ML/DL (ref 16–23)
OXYHGB MFR BLDA: 96.3 % (ref 94–97)
PCO2 BLDA: 56.6 MM HG (ref 36–44)
PH BLDA: 7.39 [PH] (ref 7.35–7.45)
PHOSPHATE SERPL-MCNC: 3.5 MG/DL (ref 2.3–4.1)
PLATELET # BLD AUTO: 170 THOUSANDS/UL (ref 149–390)
PMV BLD AUTO: 10.4 FL (ref 8.9–12.7)
PO2 BLDA: 92.8 MM HG (ref 75–129)
POTASSIUM SERPL-SCNC: 4.4 MMOL/L (ref 3.5–5.3)
RBC # BLD AUTO: 2.91 MILLION/UL (ref 3.88–5.62)
RSV RNA RESP QL NAA+PROBE: NEGATIVE
SARS-COV-2 RNA RESP QL NAA+PROBE: NEGATIVE
SODIUM SERPL-SCNC: 139 MMOL/L (ref 136–145)
SPECIMEN SOURCE: ABNORMAL
WBC # BLD AUTO: 11.49 THOUSAND/UL (ref 4.31–10.16)

## 2020-12-25 PROCEDURE — 84100 ASSAY OF PHOSPHORUS: CPT | Performed by: GENERAL PRACTICE

## 2020-12-25 PROCEDURE — 94760 N-INVAS EAR/PLS OXIMETRY 1: CPT

## 2020-12-25 PROCEDURE — 71045 X-RAY EXAM CHEST 1 VIEW: CPT

## 2020-12-25 PROCEDURE — 94664 DEMO&/EVAL PT USE INHALER: CPT

## 2020-12-25 PROCEDURE — 94669 MECHANICAL CHEST WALL OSCILL: CPT

## 2020-12-25 PROCEDURE — 85027 COMPLETE CBC AUTOMATED: CPT | Performed by: GENERAL PRACTICE

## 2020-12-25 PROCEDURE — 0241U HB NFCT DS VIR RESP RNA 4 TRGT: CPT | Performed by: INTERNAL MEDICINE

## 2020-12-25 PROCEDURE — 80048 BASIC METABOLIC PNL TOTAL CA: CPT | Performed by: GENERAL PRACTICE

## 2020-12-25 PROCEDURE — 71275 CT ANGIOGRAPHY CHEST: CPT

## 2020-12-25 PROCEDURE — 94668 MNPJ CHEST WALL SBSQ: CPT

## 2020-12-25 PROCEDURE — G1004 CDSM NDSC: HCPCS

## 2020-12-25 PROCEDURE — 36600 WITHDRAWAL OF ARTERIAL BLOOD: CPT

## 2020-12-25 PROCEDURE — 82805 BLOOD GASES W/O2 SATURATION: CPT | Performed by: INTERNAL MEDICINE

## 2020-12-25 PROCEDURE — 99232 SBSQ HOSP IP/OBS MODERATE 35: CPT | Performed by: INTERNAL MEDICINE

## 2020-12-25 PROCEDURE — 99233 SBSQ HOSP IP/OBS HIGH 50: CPT | Performed by: INTERNAL MEDICINE

## 2020-12-25 PROCEDURE — 82948 REAGENT STRIP/BLOOD GLUCOSE: CPT

## 2020-12-25 PROCEDURE — 94640 AIRWAY INHALATION TREATMENT: CPT

## 2020-12-25 PROCEDURE — 83735 ASSAY OF MAGNESIUM: CPT | Performed by: GENERAL PRACTICE

## 2020-12-25 PROCEDURE — 94003 VENT MGMT INPAT SUBQ DAY: CPT

## 2020-12-25 PROCEDURE — 99024 POSTOP FOLLOW-UP VISIT: CPT | Performed by: STUDENT IN AN ORGANIZED HEALTH CARE EDUCATION/TRAINING PROGRAM

## 2020-12-25 RX ORDER — SODIUM CHLORIDE 30 MG/ML INHALATION SOLUTION 30 MG/ML
4 SOLUTION INHALANT
Status: DISCONTINUED | OUTPATIENT
Start: 2020-12-25 | End: 2020-12-28

## 2020-12-25 RX ORDER — DEXTROSE MONOHYDRATE 25 G/50ML
INJECTION, SOLUTION INTRAVENOUS
Status: COMPLETED
Start: 2020-12-25 | End: 2020-12-25

## 2020-12-25 RX ORDER — DEXTROSE AND SODIUM CHLORIDE 5; .45 G/100ML; G/100ML
75 INJECTION, SOLUTION INTRAVENOUS CONTINUOUS
Status: DISCONTINUED | OUTPATIENT
Start: 2020-12-25 | End: 2020-12-26

## 2020-12-25 RX ORDER — LEVALBUTEROL 1.25 MG/.5ML
1.25 SOLUTION, CONCENTRATE RESPIRATORY (INHALATION)
Status: DISCONTINUED | OUTPATIENT
Start: 2020-12-25 | End: 2020-12-25

## 2020-12-25 RX ORDER — ECHINACEA PURPUREA EXTRACT 125 MG
1 TABLET ORAL
Status: DISCONTINUED | OUTPATIENT
Start: 2020-12-25 | End: 2020-12-28

## 2020-12-25 RX ORDER — ACETYLCYSTEINE 200 MG/ML
3 SOLUTION ORAL; RESPIRATORY (INHALATION) EVERY 12 HOURS
Status: DISCONTINUED | OUTPATIENT
Start: 2020-12-25 | End: 2020-12-25

## 2020-12-25 RX ORDER — LEVALBUTEROL 1.25 MG/.5ML
1.25 SOLUTION, CONCENTRATE RESPIRATORY (INHALATION)
Status: DISCONTINUED | OUTPATIENT
Start: 2020-12-25 | End: 2020-12-28

## 2020-12-25 RX ADMIN — MIDODRINE HYDROCHLORIDE 5 MG: 5 TABLET ORAL at 11:14

## 2020-12-25 RX ADMIN — SODIUM CHLORIDE SOLN NEBU 3% 4 ML: 3 NEBU SOLN at 11:22

## 2020-12-25 RX ADMIN — SODIUM CHLORIDE, SODIUM LACTATE, POTASSIUM CHLORIDE, AND CALCIUM CHLORIDE 75 ML/HR: .6; .31; .03; .02 INJECTION, SOLUTION INTRAVENOUS at 09:42

## 2020-12-25 RX ADMIN — MIRTAZAPINE 15 MG: 15 TABLET, FILM COATED ORAL at 22:31

## 2020-12-25 RX ADMIN — MIDODRINE HYDROCHLORIDE 5 MG: 5 TABLET ORAL at 17:23

## 2020-12-25 RX ADMIN — LEVALBUTEROL HYDROCHLORIDE 1.25 MG: 1.25 SOLUTION, CONCENTRATE RESPIRATORY (INHALATION) at 11:22

## 2020-12-25 RX ADMIN — LEVALBUTEROL HYDROCHLORIDE 1.25 MG: 1.25 SOLUTION, CONCENTRATE RESPIRATORY (INHALATION) at 19:49

## 2020-12-25 RX ADMIN — FINASTERIDE 5 MG: 5 TABLET, FILM COATED ORAL at 11:14

## 2020-12-25 RX ADMIN — SODIUM CHLORIDE SOLN NEBU 3% 4 ML: 3 NEBU SOLN at 19:49

## 2020-12-25 RX ADMIN — IOHEXOL 85 ML: 350 INJECTION, SOLUTION INTRAVENOUS at 17:51

## 2020-12-25 RX ADMIN — DEXTROSE MONOHYDRATE 25 ML: 500 INJECTION PARENTERAL at 12:02

## 2020-12-25 RX ADMIN — DEXTROSE AND SODIUM CHLORIDE 75 ML/HR: 5; .45 INJECTION, SOLUTION INTRAVENOUS at 19:47

## 2020-12-25 RX ADMIN — GUAIFENESIN 400 MG: 100 SOLUTION ORAL at 17:23

## 2020-12-25 RX ADMIN — ENOXAPARIN SODIUM 40 MG: 40 INJECTION SUBCUTANEOUS at 11:14

## 2020-12-26 LAB
ALBUMIN SERPL BCP-MCNC: 1.3 G/DL (ref 3.5–5)
ALP SERPL-CCNC: 95 U/L (ref 46–116)
ALT SERPL W P-5'-P-CCNC: 8 U/L (ref 12–78)
ANION GAP SERPL CALCULATED.3IONS-SCNC: 2 MMOL/L (ref 4–13)
AST SERPL W P-5'-P-CCNC: 18 U/L (ref 5–45)
BILIRUB DIRECT SERPL-MCNC: 0.22 MG/DL (ref 0–0.2)
BILIRUB SERPL-MCNC: 0.42 MG/DL (ref 0.2–1)
BUN SERPL-MCNC: 16 MG/DL (ref 5–25)
CALCIUM SERPL-MCNC: 8.8 MG/DL (ref 8.3–10.1)
CHLORIDE SERPL-SCNC: 104 MMOL/L (ref 100–108)
CO2 SERPL-SCNC: 35 MMOL/L (ref 21–32)
CREAT SERPL-MCNC: 1.04 MG/DL (ref 0.6–1.3)
ERYTHROCYTE [DISTWIDTH] IN BLOOD BY AUTOMATED COUNT: 15.8 % (ref 11.6–15.1)
GFR SERPL CREATININE-BSD FRML MDRD: 67 ML/MIN/1.73SQ M
GLUCOSE SERPL-MCNC: 158 MG/DL (ref 65–140)
GLUCOSE SERPL-MCNC: 164 MG/DL (ref 65–140)
GLUCOSE SERPL-MCNC: 178 MG/DL (ref 65–140)
GLUCOSE SERPL-MCNC: 187 MG/DL (ref 65–140)
GLUCOSE SERPL-MCNC: 188 MG/DL (ref 65–140)
HCT VFR BLD AUTO: 28.5 % (ref 36.5–49.3)
HGB BLD-MCNC: 8.6 G/DL (ref 12–17)
MAGNESIUM SERPL-MCNC: 1.6 MG/DL (ref 1.6–2.6)
MCH RBC QN AUTO: 30.8 PG (ref 26.8–34.3)
MCHC RBC AUTO-ENTMCNC: 30.2 G/DL (ref 31.4–37.4)
MCV RBC AUTO: 102 FL (ref 82–98)
PHOSPHATE SERPL-MCNC: 3.8 MG/DL (ref 2.3–4.1)
PLATELET # BLD AUTO: 174 THOUSANDS/UL (ref 149–390)
PMV BLD AUTO: 10.5 FL (ref 8.9–12.7)
POTASSIUM SERPL-SCNC: 4.5 MMOL/L (ref 3.5–5.3)
PROCALCITONIN SERPL-MCNC: 1.56 NG/ML
PROT SERPL-MCNC: 6.4 G/DL (ref 6.4–8.2)
RBC # BLD AUTO: 2.79 MILLION/UL (ref 3.88–5.62)
SODIUM SERPL-SCNC: 141 MMOL/L (ref 136–145)
WBC # BLD AUTO: 21.98 THOUSAND/UL (ref 4.31–10.16)

## 2020-12-26 PROCEDURE — 94003 VENT MGMT INPAT SUBQ DAY: CPT

## 2020-12-26 PROCEDURE — 85027 COMPLETE CBC AUTOMATED: CPT | Performed by: INTERNAL MEDICINE

## 2020-12-26 PROCEDURE — 99232 SBSQ HOSP IP/OBS MODERATE 35: CPT | Performed by: INTERNAL MEDICINE

## 2020-12-26 PROCEDURE — 99233 SBSQ HOSP IP/OBS HIGH 50: CPT | Performed by: INTERNAL MEDICINE

## 2020-12-26 PROCEDURE — 84145 PROCALCITONIN (PCT): CPT | Performed by: INTERNAL MEDICINE

## 2020-12-26 PROCEDURE — 94668 MNPJ CHEST WALL SBSQ: CPT

## 2020-12-26 PROCEDURE — 99024 POSTOP FOLLOW-UP VISIT: CPT | Performed by: STUDENT IN AN ORGANIZED HEALTH CARE EDUCATION/TRAINING PROGRAM

## 2020-12-26 PROCEDURE — 87205 SMEAR GRAM STAIN: CPT | Performed by: INTERNAL MEDICINE

## 2020-12-26 PROCEDURE — 94640 AIRWAY INHALATION TREATMENT: CPT

## 2020-12-26 PROCEDURE — 94760 N-INVAS EAR/PLS OXIMETRY 1: CPT

## 2020-12-26 PROCEDURE — 84100 ASSAY OF PHOSPHORUS: CPT | Performed by: INTERNAL MEDICINE

## 2020-12-26 PROCEDURE — 82948 REAGENT STRIP/BLOOD GLUCOSE: CPT

## 2020-12-26 PROCEDURE — 87070 CULTURE OTHR SPECIMN AEROBIC: CPT | Performed by: INTERNAL MEDICINE

## 2020-12-26 PROCEDURE — 80076 HEPATIC FUNCTION PANEL: CPT | Performed by: INTERNAL MEDICINE

## 2020-12-26 PROCEDURE — 80048 BASIC METABOLIC PNL TOTAL CA: CPT | Performed by: INTERNAL MEDICINE

## 2020-12-26 PROCEDURE — 94669 MECHANICAL CHEST WALL OSCILL: CPT

## 2020-12-26 PROCEDURE — 83735 ASSAY OF MAGNESIUM: CPT | Performed by: INTERNAL MEDICINE

## 2020-12-26 RX ORDER — INSULIN GLARGINE 100 [IU]/ML
10 INJECTION, SOLUTION SUBCUTANEOUS
Status: DISCONTINUED | OUTPATIENT
Start: 2020-12-26 | End: 2020-12-27

## 2020-12-26 RX ORDER — VANCOMYCIN HYDROCHLORIDE 1 G/200ML
1000 INJECTION, SOLUTION INTRAVENOUS EVERY 24 HOURS
Status: DISCONTINUED | OUTPATIENT
Start: 2020-12-26 | End: 2020-12-28

## 2020-12-26 RX ADMIN — LEVALBUTEROL HYDROCHLORIDE 1.25 MG: 1.25 SOLUTION, CONCENTRATE RESPIRATORY (INHALATION) at 20:10

## 2020-12-26 RX ADMIN — MIDODRINE HYDROCHLORIDE 5 MG: 5 TABLET ORAL at 06:38

## 2020-12-26 RX ADMIN — VANCOMYCIN HYDROCHLORIDE 1000 MG: 1 INJECTION, SOLUTION INTRAVENOUS at 13:22

## 2020-12-26 RX ADMIN — MIDODRINE HYDROCHLORIDE 5 MG: 5 TABLET ORAL at 16:58

## 2020-12-26 RX ADMIN — PIPERACILLIN AND TAZOBACTAM 3.38 G: 36; 4.5 INJECTION, POWDER, FOR SOLUTION INTRAVENOUS at 17:03

## 2020-12-26 RX ADMIN — PIPERACILLIN AND TAZOBACTAM 3.38 G: 36; 4.5 INJECTION, POWDER, FOR SOLUTION INTRAVENOUS at 23:04

## 2020-12-26 RX ADMIN — PIPERACILLIN AND TAZOBACTAM 3.38 G: 36; 4.5 INJECTION, POWDER, FOR SOLUTION INTRAVENOUS at 12:34

## 2020-12-26 RX ADMIN — SODIUM CHLORIDE SOLN NEBU 3% 4 ML: 3 NEBU SOLN at 07:23

## 2020-12-26 RX ADMIN — GUAIFENESIN 400 MG: 100 SOLUTION ORAL at 11:10

## 2020-12-26 RX ADMIN — ENOXAPARIN SODIUM 40 MG: 40 INJECTION SUBCUTANEOUS at 08:59

## 2020-12-26 RX ADMIN — INSULIN LISPRO 1 UNITS: 100 INJECTION, SOLUTION INTRAVENOUS; SUBCUTANEOUS at 16:50

## 2020-12-26 RX ADMIN — MIDODRINE HYDROCHLORIDE 5 MG: 5 TABLET ORAL at 10:48

## 2020-12-26 RX ADMIN — INSULIN LISPRO 1 UNITS: 100 INJECTION, SOLUTION INTRAVENOUS; SUBCUTANEOUS at 09:17

## 2020-12-26 RX ADMIN — INSULIN LISPRO 1 UNITS: 100 INJECTION, SOLUTION INTRAVENOUS; SUBCUTANEOUS at 11:09

## 2020-12-26 RX ADMIN — LEVALBUTEROL HYDROCHLORIDE 1.25 MG: 1.25 SOLUTION, CONCENTRATE RESPIRATORY (INHALATION) at 07:23

## 2020-12-26 RX ADMIN — SODIUM CHLORIDE SOLN NEBU 3% 4 ML: 3 NEBU SOLN at 20:10

## 2020-12-26 RX ADMIN — SODIUM CHLORIDE SOLN NEBU 3% 4 ML: 3 NEBU SOLN at 14:00

## 2020-12-26 RX ADMIN — FINASTERIDE 5 MG: 5 TABLET, FILM COATED ORAL at 08:59

## 2020-12-26 RX ADMIN — MIRTAZAPINE 15 MG: 15 TABLET, FILM COATED ORAL at 23:04

## 2020-12-26 RX ADMIN — INSULIN GLARGINE 10 UNITS: 100 INJECTION, SOLUTION SUBCUTANEOUS at 23:04

## 2020-12-26 RX ADMIN — LEVALBUTEROL HYDROCHLORIDE 1.25 MG: 1.25 SOLUTION, CONCENTRATE RESPIRATORY (INHALATION) at 14:00

## 2020-12-26 RX ADMIN — SENNOSIDES 8.8 MG: 8.8 SYRUP ORAL at 23:05

## 2020-12-27 ENCOUNTER — APPOINTMENT (INPATIENT)
Dept: RADIOLOGY | Facility: HOSPITAL | Age: 80
DRG: 871 | End: 2020-12-27
Payer: MEDICARE

## 2020-12-27 LAB
ANION GAP SERPL CALCULATED.3IONS-SCNC: -1 MMOL/L (ref 4–13)
ANISOCYTOSIS BLD QL SMEAR: PRESENT
BASOPHILS # BLD MANUAL: 0 THOUSAND/UL (ref 0–0.1)
BASOPHILS NFR MAR MANUAL: 0 % (ref 0–1)
BUN SERPL-MCNC: 22 MG/DL (ref 5–25)
CALCIUM SERPL-MCNC: 9.1 MG/DL (ref 8.3–10.1)
CHLORIDE SERPL-SCNC: 105 MMOL/L (ref 100–108)
CO2 SERPL-SCNC: 37 MMOL/L (ref 21–32)
CREAT SERPL-MCNC: 1.16 MG/DL (ref 0.6–1.3)
EOSINOPHIL # BLD MANUAL: 0 THOUSAND/UL (ref 0–0.4)
EOSINOPHIL NFR BLD MANUAL: 0 % (ref 0–6)
ERYTHROCYTE [DISTWIDTH] IN BLOOD BY AUTOMATED COUNT: 15.6 % (ref 11.6–15.1)
GFR SERPL CREATININE-BSD FRML MDRD: 59 ML/MIN/1.73SQ M
GLUCOSE SERPL-MCNC: 135 MG/DL (ref 65–140)
GLUCOSE SERPL-MCNC: 170 MG/DL (ref 65–140)
GLUCOSE SERPL-MCNC: 196 MG/DL (ref 65–140)
GLUCOSE SERPL-MCNC: 42 MG/DL (ref 65–140)
GLUCOSE SERPL-MCNC: 48 MG/DL (ref 65–140)
GLUCOSE SERPL-MCNC: 49 MG/DL (ref 65–140)
HCT VFR BLD AUTO: 27 % (ref 36.5–49.3)
HGB BLD-MCNC: 8.2 G/DL (ref 12–17)
HYPERCHROMIA BLD QL SMEAR: PRESENT
LYMPHOCYTES # BLD AUTO: 0 % (ref 14–44)
LYMPHOCYTES # BLD AUTO: 0 THOUSAND/UL (ref 0.6–4.47)
MACROCYTES BLD QL AUTO: PRESENT
MAGNESIUM SERPL-MCNC: 1.7 MG/DL (ref 1.6–2.6)
MCH RBC QN AUTO: 31.1 PG (ref 26.8–34.3)
MCHC RBC AUTO-ENTMCNC: 30.4 G/DL (ref 31.4–37.4)
MCV RBC AUTO: 102 FL (ref 82–98)
MONOCYTES # BLD AUTO: 0 THOUSAND/UL (ref 0–1.22)
MONOCYTES NFR BLD: 0 % (ref 4–12)
NEUTROPHILS # BLD MANUAL: 25.12 THOUSAND/UL (ref 1.85–7.62)
NEUTS BAND NFR BLD MANUAL: 1 % (ref 0–8)
NEUTS SEG NFR BLD AUTO: 99 % (ref 43–75)
NRBC BLD AUTO-RTO: 0 /100 WBCS
PHOSPHATE SERPL-MCNC: 2.6 MG/DL (ref 2.3–4.1)
PLATELET # BLD AUTO: 144 THOUSANDS/UL (ref 149–390)
PLATELET BLD QL SMEAR: ABNORMAL
PMV BLD AUTO: 9.9 FL (ref 8.9–12.7)
POLYCHROMASIA BLD QL SMEAR: PRESENT
POTASSIUM SERPL-SCNC: 3.7 MMOL/L (ref 3.5–5.3)
PROCALCITONIN SERPL-MCNC: 1.39 NG/ML
RBC # BLD AUTO: 2.64 MILLION/UL (ref 3.88–5.62)
SODIUM SERPL-SCNC: 141 MMOL/L (ref 136–145)
TOTAL CELLS COUNTED SPEC: 100
VIT B12 SERPL-MCNC: 1640 PG/ML (ref 100–900)
WBC # BLD AUTO: 25.12 THOUSAND/UL (ref 4.31–10.16)

## 2020-12-27 PROCEDURE — 80048 BASIC METABOLIC PNL TOTAL CA: CPT | Performed by: INTERNAL MEDICINE

## 2020-12-27 PROCEDURE — 84145 PROCALCITONIN (PCT): CPT | Performed by: INTERNAL MEDICINE

## 2020-12-27 PROCEDURE — 85007 BL SMEAR W/DIFF WBC COUNT: CPT | Performed by: INTERNAL MEDICINE

## 2020-12-27 PROCEDURE — 94669 MECHANICAL CHEST WALL OSCILL: CPT

## 2020-12-27 PROCEDURE — 84100 ASSAY OF PHOSPHORUS: CPT | Performed by: INTERNAL MEDICINE

## 2020-12-27 PROCEDURE — 71045 X-RAY EXAM CHEST 1 VIEW: CPT

## 2020-12-27 PROCEDURE — 83735 ASSAY OF MAGNESIUM: CPT | Performed by: INTERNAL MEDICINE

## 2020-12-27 PROCEDURE — 82948 REAGENT STRIP/BLOOD GLUCOSE: CPT

## 2020-12-27 PROCEDURE — 94668 MNPJ CHEST WALL SBSQ: CPT

## 2020-12-27 PROCEDURE — 94640 AIRWAY INHALATION TREATMENT: CPT

## 2020-12-27 PROCEDURE — 94660 CPAP INITIATION&MGMT: CPT

## 2020-12-27 PROCEDURE — 99024 POSTOP FOLLOW-UP VISIT: CPT | Performed by: STUDENT IN AN ORGANIZED HEALTH CARE EDUCATION/TRAINING PROGRAM

## 2020-12-27 PROCEDURE — 99232 SBSQ HOSP IP/OBS MODERATE 35: CPT | Performed by: INTERNAL MEDICINE

## 2020-12-27 PROCEDURE — 94760 N-INVAS EAR/PLS OXIMETRY 1: CPT

## 2020-12-27 PROCEDURE — 94664 DEMO&/EVAL PT USE INHALER: CPT

## 2020-12-27 PROCEDURE — 82607 VITAMIN B-12: CPT | Performed by: INTERNAL MEDICINE

## 2020-12-27 PROCEDURE — 85027 COMPLETE CBC AUTOMATED: CPT | Performed by: INTERNAL MEDICINE

## 2020-12-27 RX ORDER — LORAZEPAM 0.5 MG/1
0.25 TABLET ORAL EVERY 8 HOURS PRN
Status: DISCONTINUED | OUTPATIENT
Start: 2020-12-27 | End: 2020-12-28

## 2020-12-27 RX ORDER — OLANZAPINE 2.5 MG/1
2.5 TABLET ORAL
Status: DISCONTINUED | OUTPATIENT
Start: 2020-12-27 | End: 2020-12-28

## 2020-12-27 RX ORDER — INSULIN GLARGINE 100 [IU]/ML
5 INJECTION, SOLUTION SUBCUTANEOUS
Status: DISCONTINUED | OUTPATIENT
Start: 2020-12-27 | End: 2020-12-27

## 2020-12-27 RX ORDER — DEXTROSE AND SODIUM CHLORIDE 5; .45 G/100ML; G/100ML
100 INJECTION, SOLUTION INTRAVENOUS CONTINUOUS
Status: DISCONTINUED | OUTPATIENT
Start: 2020-12-27 | End: 2020-12-28

## 2020-12-27 RX ORDER — MAGNESIUM SULFATE HEPTAHYDRATE 40 MG/ML
2 INJECTION, SOLUTION INTRAVENOUS ONCE
Status: COMPLETED | OUTPATIENT
Start: 2020-12-27 | End: 2020-12-27

## 2020-12-27 RX ORDER — DEXTROSE MONOHYDRATE 25 G/50ML
INJECTION, SOLUTION INTRAVENOUS
Status: COMPLETED
Start: 2020-12-27 | End: 2020-12-27

## 2020-12-27 RX ORDER — DEXTROSE MONOHYDRATE 25 G/50ML
50 INJECTION, SOLUTION INTRAVENOUS ONCE
Status: DISCONTINUED | OUTPATIENT
Start: 2020-12-27 | End: 2020-12-28

## 2020-12-27 RX ORDER — THIAMINE MONONITRATE (VIT B1) 100 MG
100 TABLET ORAL DAILY
Status: DISCONTINUED | OUTPATIENT
Start: 2020-12-27 | End: 2020-12-28

## 2020-12-27 RX ADMIN — DEXTROSE MONOHYDRATE: 500 INJECTION PARENTERAL at 08:12

## 2020-12-27 RX ADMIN — FINASTERIDE 5 MG: 5 TABLET, FILM COATED ORAL at 09:59

## 2020-12-27 RX ADMIN — GUAIFENESIN 200 MG: 100 SOLUTION ORAL at 11:28

## 2020-12-27 RX ADMIN — APIXABAN 2.5 MG: 2.5 TABLET, FILM COATED ORAL at 09:59

## 2020-12-27 RX ADMIN — SENNOSIDES 8.8 MG: 8.8 SYRUP ORAL at 22:55

## 2020-12-27 RX ADMIN — MIDODRINE HYDROCHLORIDE 5 MG: 5 TABLET ORAL at 17:37

## 2020-12-27 RX ADMIN — PIPERACILLIN AND TAZOBACTAM 3.38 G: 36; 4.5 INJECTION, POWDER, FOR SOLUTION INTRAVENOUS at 17:38

## 2020-12-27 RX ADMIN — GUAIFENESIN 400 MG: 100 SOLUTION ORAL at 17:38

## 2020-12-27 RX ADMIN — OLANZAPINE 2.5 MG: 2.5 TABLET, FILM COATED ORAL at 22:55

## 2020-12-27 RX ADMIN — SODIUM CHLORIDE SOLN NEBU 3% 4 ML: 3 NEBU SOLN at 07:37

## 2020-12-27 RX ADMIN — SODIUM CHLORIDE SOLN NEBU 3% 4 ML: 3 NEBU SOLN at 13:52

## 2020-12-27 RX ADMIN — THIAMINE HCL TAB 100 MG 100 MG: 100 TAB at 11:37

## 2020-12-27 RX ADMIN — LEVALBUTEROL HYDROCHLORIDE 1.25 MG: 1.25 SOLUTION, CONCENTRATE RESPIRATORY (INHALATION) at 07:37

## 2020-12-27 RX ADMIN — LORAZEPAM 0.25 MG: 0.5 TABLET ORAL at 17:46

## 2020-12-27 RX ADMIN — PIPERACILLIN AND TAZOBACTAM 3.38 G: 36; 4.5 INJECTION, POWDER, FOR SOLUTION INTRAVENOUS at 22:55

## 2020-12-27 RX ADMIN — PIPERACILLIN AND TAZOBACTAM 3.38 G: 36; 4.5 INJECTION, POWDER, FOR SOLUTION INTRAVENOUS at 06:25

## 2020-12-27 RX ADMIN — PIPERACILLIN AND TAZOBACTAM 3.38 G: 36; 4.5 INJECTION, POWDER, FOR SOLUTION INTRAVENOUS at 11:37

## 2020-12-27 RX ADMIN — DEXTROSE AND SODIUM CHLORIDE 100 ML/HR: 5; .45 INJECTION, SOLUTION INTRAVENOUS at 23:56

## 2020-12-27 RX ADMIN — MIDODRINE HYDROCHLORIDE 5 MG: 5 TABLET ORAL at 06:25

## 2020-12-27 RX ADMIN — MIDODRINE HYDROCHLORIDE 5 MG: 5 TABLET ORAL at 11:29

## 2020-12-27 RX ADMIN — DIBASIC SODIUM PHOSPHATE, MONOBASIC POTASSIUM PHOSPHATE AND MONOBASIC SODIUM PHOSPHATE 1 TABLET: 852; 155; 130 TABLET ORAL at 11:37

## 2020-12-27 RX ADMIN — APIXABAN 2.5 MG: 2.5 TABLET, FILM COATED ORAL at 17:37

## 2020-12-27 RX ADMIN — MAGNESIUM SULFATE HEPTAHYDRATE 2 G: 40 INJECTION, SOLUTION INTRAVENOUS at 13:34

## 2020-12-27 RX ADMIN — LEVALBUTEROL HYDROCHLORIDE 1.25 MG: 1.25 SOLUTION, CONCENTRATE RESPIRATORY (INHALATION) at 13:52

## 2020-12-27 RX ADMIN — VANCOMYCIN HYDROCHLORIDE 1000 MG: 1 INJECTION, SOLUTION INTRAVENOUS at 12:46

## 2020-12-27 RX ADMIN — LEVALBUTEROL HYDROCHLORIDE 1.25 MG: 1.25 SOLUTION, CONCENTRATE RESPIRATORY (INHALATION) at 20:45

## 2020-12-27 RX ADMIN — SODIUM CHLORIDE SOLN NEBU 3% 4 ML: 3 NEBU SOLN at 20:45

## 2020-12-27 RX ADMIN — DEXTROSE AND SODIUM CHLORIDE 100 ML/HR: 5; .45 INJECTION, SOLUTION INTRAVENOUS at 13:59

## 2020-12-27 RX ADMIN — DIBASIC SODIUM PHOSPHATE, MONOBASIC POTASSIUM PHOSPHATE AND MONOBASIC SODIUM PHOSPHATE 1 TABLET: 852; 155; 130 TABLET ORAL at 17:37

## 2020-12-28 ENCOUNTER — APPOINTMENT (INPATIENT)
Dept: RADIOLOGY | Facility: HOSPITAL | Age: 80
DRG: 871 | End: 2020-12-28
Payer: MEDICARE

## 2020-12-28 VITALS
HEART RATE: 85 BPM | DIASTOLIC BLOOD PRESSURE: 71 MMHG | TEMPERATURE: 97.9 F | BODY MASS INDEX: 18.22 KG/M2 | RESPIRATION RATE: 20 BRPM | SYSTOLIC BLOOD PRESSURE: 147 MMHG | OXYGEN SATURATION: 98 % | HEIGHT: 69 IN | WEIGHT: 123.02 LBS

## 2020-12-28 LAB
BACTERIA SPT RESP CULT: ABNORMAL
BACTERIA SPT RESP CULT: ABNORMAL
GLUCOSE SERPL-MCNC: 166 MG/DL (ref 65–140)
GLUCOSE SERPL-MCNC: 176 MG/DL (ref 65–140)
GRAM STN SPEC: ABNORMAL

## 2020-12-28 PROCEDURE — 94669 MECHANICAL CHEST WALL OSCILL: CPT

## 2020-12-28 PROCEDURE — 94760 N-INVAS EAR/PLS OXIMETRY 1: CPT

## 2020-12-28 PROCEDURE — 71045 X-RAY EXAM CHEST 1 VIEW: CPT

## 2020-12-28 PROCEDURE — 99232 SBSQ HOSP IP/OBS MODERATE 35: CPT | Performed by: INTERNAL MEDICINE

## 2020-12-28 PROCEDURE — 99232 SBSQ HOSP IP/OBS MODERATE 35: CPT | Performed by: EMERGENCY MEDICINE

## 2020-12-28 PROCEDURE — 82948 REAGENT STRIP/BLOOD GLUCOSE: CPT

## 2020-12-28 RX ORDER — HALOPERIDOL 5 MG/ML
1 INJECTION INTRAMUSCULAR
Status: DISCONTINUED | OUTPATIENT
Start: 2020-12-28 | End: 2020-12-29 | Stop reason: HOSPADM

## 2020-12-28 RX ORDER — HYDROMORPHONE HCL/PF 1 MG/ML
0.5 SYRINGE (ML) INJECTION
Status: DISCONTINUED | OUTPATIENT
Start: 2020-12-28 | End: 2020-12-29 | Stop reason: HOSPADM

## 2020-12-28 RX ORDER — LORAZEPAM 2 MG/ML
0.5 INJECTION INTRAMUSCULAR
Status: DISCONTINUED | OUTPATIENT
Start: 2020-12-28 | End: 2020-12-29 | Stop reason: HOSPADM

## 2020-12-28 RX ORDER — MINERAL OIL AND PETROLATUM 150; 830 MG/G; MG/G
OINTMENT OPHTHALMIC DAILY
Status: DISCONTINUED | OUTPATIENT
Start: 2020-12-28 | End: 2020-12-29 | Stop reason: HOSPADM

## 2020-12-28 RX ORDER — GLYCOPYRROLATE 0.2 MG/ML
0.2 INJECTION INTRAMUSCULAR; INTRAVENOUS
Status: DISCONTINUED | OUTPATIENT
Start: 2020-12-28 | End: 2020-12-29 | Stop reason: HOSPADM

## 2020-12-28 RX ADMIN — LORAZEPAM 0.5 MG: 2 INJECTION INTRAMUSCULAR; INTRAVENOUS at 03:59

## 2020-12-28 RX ADMIN — LORAZEPAM 0.5 MG: 2 INJECTION INTRAMUSCULAR; INTRAVENOUS at 10:52

## 2020-12-28 RX ADMIN — WHITE PETROLATUM 57.7 %-MINERAL OIL 31.9 % EYE OINTMENT: at 15:40

## 2020-12-28 RX ADMIN — HYDROMORPHONE HYDROCHLORIDE 0.5 MG: 1 INJECTION, SOLUTION INTRAMUSCULAR; INTRAVENOUS; SUBCUTANEOUS at 23:02

## 2020-12-28 RX ADMIN — MORPHINE SULFATE 2 MG: 2 INJECTION, SOLUTION INTRAMUSCULAR; INTRAVENOUS at 03:42

## 2024-07-02 NOTE — PROGRESS NOTES
Leave wrap in place do not remove, will remove in clinic tomorrow.    Can shower 3 days after surgery, wear surgical bra provided at post op appointment 24/7 unless showering or laundering.  No submerging in water such as baths or pools.    Activity Restrictions:  No heavy lifting, keep arms around shoulder height or below, no upper body exercising for 1 month.    Prescriptions for post op sent to pharmacy.    Follow up with Dr. Wen tomorrow in clinic.  Above per MD    Post-Surgery Checklist    Follow up care is an important part of a successful surgery.  Your surgeon needs to see that you are healing and recovering well.  Please make sure to call and schedule your first follow-up visit with your surgeon.    Call your surgeon if you have any questions specific to your recovery.  Here is what you can do at home:    Pain Management.  Take your pain medication as directed, before the pain becomes too severe.  It may not work as well, if you wait too long between doses.  Pain medication may upset your stomach.  You can take a small amount of food to help with it.  Ask your healthcare provider of other ways to control pain, which may be heat, ice, or relaxation.    If your healthcare provider prescribes over the counter (OTC) medication, like acetaminophen, then ask how much you should take each day and verify that the medication will not interact with any other prescribed medication.  You can overdose on these medications, especially in combination with other medications.  Drinking alcohol and taking pain medication can cause dizziness and slow your breathing.  It can even be deadly.  Do NOT drink alcohol while taking pain medication.  Constipation. Constipation is a common side effect of pain medicine.  Drinking plenty of fluids and eating fruits and vegetables high in fiber can also help.  Call your healthcare provider before taking any medications such as laxatives or stool softeners to ease the constipation.   Surgical Oncology Follow Up       [address]  [facility] [address] [address]    Holly Stover  1940  [id number]  [address]  [facility] [address] [address]    Diagnoses and all orders for this visit:    Encounter for follow-up examination after completed treatment for malignant neoplasm    Personal history of malignant neoplasm of esophagus    Personal history of malignant neoplasm of gallbladder        Chief Complaint   Patient presents with    Follow-up     Pt here for a 6M carcinoma of esophagus F/U       Return in about 6 months (around 7/22/2019)  Personal history of malignant neoplasm of esophagus    11/11/2014 Initial Diagnosis     Carcinoma of esophagus ([address] )         11/11/2014 Biopsy     GE junction biopsy and distal esophageal biopsy - 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  The tumor is histologically dissimilar to the patient's previously diagnosed adenocarcinoma of the gallbladder         11/24/2014 - 11/26/2014 Chemotherapy     Etoposide with carboplatin         12/17/2014 - 1/20/2015 Radiation     Distal esophagus to 4140 cGy         12/17/2014 - 1/20/2015 Chemotherapy     Concurrent weekly Taxol and carboplatin  1/13/15 had reaction to carboplatin and it was stopped    Taxol only aftyer that         3/4/2015 Surgery     Esophagectomy by Dr Fermín Jorge         6/25/2015 - 7/29/2015 Chemotherapy     Temodar           Personal history of malignant neoplasm of gallbladder    5/17/2005 Initial Diagnosis     Malignant neoplasm of gallbladder [facility])         7/2005 Surgery     Cholecystectomy followed by RFA         8/1/2005 - 9/2/2005 Radiation     Right abdomen/gallbladder area to 4500 cGy         8/1/2005 - 9/6/2005 Chemotherapy     Continuous infusion 5FU with radiation         4/2007 Progression Recurrent disease         5/30/2007 - 1/30/2008 Chemotherapy     Gemcitabine/cisplatin/carboplatin         2/21/2008 Surgery     Resection of liver lesions and hannah hepatic mass         3/26/2008 - 8/20/2008 Chemotherapy     Gemcitabine/carboplatin            Diagnosis and Staging: Gallbladder cancer: T2N0M0   initially treated 2005  Recurrence treated February 2008  [id number] 30cm and 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  [id number] esophageal cancer March 2015   Treatment History: Radical cholecystectomy with liver resection 2005  Recurrence treated with resection of segment 5 lesion in 2008  Carboplatin and etoposide November 2014 for his esophageal carcinoma  Radiation completed January 20, 2015  Transhiatal esophagectomy, March 2015  Xeloda/Temodar   Current Therapy: Observation   Disease Status: MILTON     History of Present Illness:  Patient returns in follow-up after his esophagectomy  He continues to do well  He is maintaining his weight  He denies any abdominal pain, nausea, vomiting  He has not had a recent CT since July  No dysphagia  He is eating all food without any difficulty  Review of Systems  Complete ROS Surg Onc:   Complete ROS Surg Onc:   Constitutional: The patient denies new or recent history of general fatigue, no recent weight loss, no change in appetite  Eyes: No complaints of visual problems, no scleral icterus  ENT: no complaints of ear pain, no hoarseness, no difficulty swallowing,  no tinnitus and no new masses in head, oral cavity, or neck  Cardiovascular: No complaints of chest pain, no palpitations, no ankle edema  Respiratory: No complaints of shortness of breath, no cough  Gastrointestinal: No complaints of jaundice, no bloody stools, no pale stools  Genitourinary: No complaints of dysuria, no hematuria, no nocturia, no frequent urination, no urethral discharge     Musculoskeletal: No complaints of weakness, paralysis, joint stiffness or Remember to call your surgeon prior to taking any laxatives.  Preventing blood clots and pneumonia.  Blood clots and pneumonia can still occur during the recovery period.  Follow the discharge instructions that you were given by your surgeon.  Your surgeon may want you to resume regular activity/exercises or wear compression stockings to prevent any blood clots.  Also, make sure to keep up with deep breathing and coughing exercises you learned in the hospital to prevent pneumonia.    Incision care. Follow instructions that you were given by your surgeon or in the hospital regarding when it's safe to shower.  Until then, keep the incision dry.  Watch for signs of infection, which include redness, swelling, drainage from incision, fever (100.4   F or higher), or as directed by your health care provider. (Please see section on Preventing Surgical Site Infections for more information)  Activity.  At your follow-up visit, ask your surgeon when it's safe to return to your regular activities.  Slowly get back to exercise and other activities.    Returning to work.  Going back to work depends on your surgery and your type of job.  You and your surgeon will decide when you can return to work.  Driving.  Do NOT drive until the surgeon tells you that you can.  Do NOT drive or operate any heavy machinery for 24 hours after anesthesia and while you are still taking pain medication because it can make you react more slowly to things.  Do not make important decisions.  For the first 24 hours after anesthesia, do NOT make any important decisions, like signing legal documents.    Get good nutrition.  Healthy eating helps your body heal.  If you had a special diet before surgery, ask your surgeon if you should stay on the same diet.  Nausea. Some people may experience nausea and vomiting after surgery, which may be due to anesthesia, pain medication, or the stress of surgery.  Please remember to not push yourself to eat.  Your body  arthralgias  Integumentary: No complaints of rash, no new lesions  Neurological: No complaints of convulsions, no seizures, no dizziness  Hematologic/Lymphatic: No complaints of easy bruising  Endocrine:  No hot or cold intolerance  No polydipsia, polyphagia, or polyuria  Allergy/immunology:  No environmental allergies  No food allergies  Not immunocompromised  Skin:  No pallor or rash  No wound          Patient Active Problem List   Diagnosis    Personal history of malignant neoplasm of esophagus    Personal history of malignant neoplasm of gallbladder    Type 2 diabetes mellitus ([place] [id number] )    Hypertension, essential, benign    Coronary artery disease involving native coronary artery of native heart without angina pectoris    Hypercholesterolemia    Encounter for follow-up examination after completed treatment for malignant neoplasm    History of chemotherapy     Past Medical History:   Diagnosis Date    Abnormal EKG     Acute myocardial infarction ([place] [address] )     Dry skin     LAST ASSESSED: [id number]    History of chemotherapy     History of external beam radiation therapy     Hypotension     Hypoxia     Liver cancer ([place] [address] )     Puncture wound of thumb     LAST ASSESSED: [id number]    Superficial injury of foot     LAST ASSESSED: [id number]    Tachycardia     Type 2 diabetes mellitus (HCC)     LAST ASSESSED: [id number]    Vitamin D deficiency     LAST ASSESSED: [id number]     Past Surgical History:   Procedure Laterality Date    ANGIOPLASTY      PREVIOUS LASER ANGIOPLASTY WITH STENT PLACEMENT    CATARACT EXTRACTION W/  INTRAOCULAR LENS IMPLANT Bilateral     CHOLECYSTECTOMY      CORONARY ANGIOPLASTY WITH STENT PLACEMENT      PLACEMENT OF STENT 1    GALLBLADDER SURGERY      HIP SURGERY      TONSILLECTOMY       Family History   Problem Relation Age of Onset    Leukemia Mother     Lymphoma Son     Lymphoma Son     No Known Problems Father      Social History     Social History    Marital will tell you when to eat and how much.  Follow these suggested tips to help manage nausea:  Start with clear liquids and soup  Advance to semi-solid foods like mashed potatoes, gelatin, and applesauce.    Slowly move to solid foods, but don't eat fatty, rich, or spicy foods at first.    You can try smaller meals more frequently.  Follow nutrition directions provided by surgeon.  If you have obstructive sleep apnea.  You were given anesthesia and pain medicine during surgery to keep you comfortable and your pain controlled.  After surgery, you may experience more apnea spells or longer apnea spells due to the medication that you were given.  At home:  Keep using the continuous positive airway pressure (CPAP) device when you sleep.  Unless your healthcare provider tells you not to, use it when you sleep, day or night.    Talk with your healthcare provider before taking any pain medicine, muscle relaxants, or sedatives.  Your provider will tell you about the possible dangers of taking these medications.      When to call your healthcare provider    Call your surgeon right away if you have any of the following:    Pain, swelling, and redness in your leg or arm  Increased pain or pain that is not relieved with medications  Feel too sleepy, dizzy, or groggy  Drainage, redness, bleeding, or swelling around the incision  Incision opens up (cover it with a clean dressing or cloth)  Severe nausea or vomiting  Fever of 100.4   F (38  C) or higher  Skin changes, such as a rash, itching, or hives, which may mean an allergic reaction (if any facial swelling or trouble breathing, call 911 right away)      Call 911    Call 911 if you have the following symptoms:    Chest Pain  Trouble Breathing  Fast Heartbeat  Fainting  Heavy or Uncontrolled Bleeding    © 9613-9172 [employer] [address] [place] [address], [place] [address]. All rights reserved. This information is not intended as a substitute for professional medical  care. Always follow your healthcare professional's instructions.  © 0065-9511 The [employer] [address] [place] [address], [place] [address]. All rights reserved. This information is not intended as a substitute for professional medical care. Always follow your healthcare professional's instructions.    status:      Spouse name: Virginia Wells Number of children: N/A    Years of education: N/A     Occupational History    Retired      Social History Main Topics    Smoking status: Former Smoker     Quit date: 2/15/1975    Smokeless tobacco: Never Used      Comment: FORMER SMOKER - SMOKED 2 PPD FOR 20 YRS AND QUIT 2/15/1975 AS PER [employer]    Alcohol use No      Comment: (HISTORY) RARELY AS PER [employer]    Drug use: No    Sexual activity: Not on file     Other Topics Concern    Not on file     Social History Narrative    No narrative on file       Current Outpatient Prescriptions:     allopurinol (ZYLOPRIM) 100 mg tablet, Take 1 tablet by mouth 2 (two) times a day, Disp: , Rfl:     aspirin 81 MG tablet, Take 2 tablets by mouth, Disp: , Rfl:     atorvastatin (LIPITOR) 40 mg tablet, Take 40 mg by mouth daily  , Disp: , Rfl:     cyanocobalamin (VITAMIN B-12) 100 mcg tablet, Take by mouth daily  , Disp: , Rfl:     ergocalciferol (VITAMIN D2) 50,000 units, , Disp: , Rfl:     Ferrous Sulfate (IRON) 325 (65 Fe) MG TABS, Take 1 tablet by mouth daily, Disp: , Rfl:     finasteride (PROSCAR) 5 mg tablet, Take 1 tablet (5 mg total) by mouth daily, Disp: 90 tablet, Rfl: 1    furosemide (LASIX) 20 mg tablet, Take 20 mg by mouth as needed  , Disp: , Rfl:     KLOR-CON 10 10 MEQ tablet, Take 10 mEq by mouth daily  , Disp: , Rfl:     Magnesium Oxide 400 MG CAPS, Take 3 capsules by mouth daily  , Disp: , Rfl:     nitroglycerin (NITROLINGUAL) 0 4 mg/spray spray, Place 1 spray under the tongue every 5 (five) minutes as needed for chest pain, Disp: 12 g, Rfl: 5    polyethylene glycol-propylene glycol (SYSTANE ULTRA) 0 4-0 3 %, Apply 1 drop to eye daily, Disp: , Rfl:   Allergies   Allergen Reactions    Carboplatin Facial Swelling and Rash     Category: Allergy;      Vitals:    01/22/19 0858   BP: 102/50   Pulse: 87   Resp: 15   Temp: 97 6 °F (36 4 °C)       Physical Exam  Constitutional: General appearance:  The Patient is well-developed and well-nourished who appears the stated age in no acute distress  Patient is pleasant and talkative  HEENT:  Normocephalic  Sclerae are anicteric  Mucous membranes are moist  Neck is supple without adenopathy  No JVD  Chest: The lungs are clear to auscultation  Cardiac: Heart is regular rate  Abdomen: Abdomen is soft, non-tender, non-distended and without masses  Extremities: There is no clubbing or cyanosis  There is no edema  Symmetric  Neuro: Grossly nonfocal  Gait is normal      Lymphatic: No evidence of cervical adenopathy bilaterally  No evidence of axillary adenopathy bilaterally  No evidence of inguinal adenopathy bilaterally  Skin: Warm, anicteric  Psych:  Patient is pleasant and talkative  Breasts:        Pathology:  [unfilled]    Labs:      Imaging  No results found  I reviewed the above laboratory and imaging data  Discussion/Summary: 76-year-old male with a remote history of gallbladder carcinoma and now with a [id number] esophageal carcinoma  The initial pathology revealed a neuroendocrine carcinoma, but the surgical pathology on the resection revealed adenocarcinoma with neuroendocrine features  At this time he is doing relatively well  He is clinically MILTON from his esophageal cancer at over 4 years and 11 years from his gallbladder cancer recurrence  He will continue his current regimen of nutritional supplements to maintain his weight  I will see him again in 6 months for another exam  His imaging is usually ordered by his medical oncologist so we will await the next set of imaging  He will continue endoscopic surveillance with his gastroenterologist  He is agreeable to this   All his questions were answered

## (undated) DEVICE — MEDI-VAC YANK SUCT HNDL W/TPRD BULBOUS TIP: Brand: CARDINAL HEALTH

## (undated) DEVICE — SUT SILK 2-0 SH CR/8 18 IN C012D

## (undated) DEVICE — 2000CC GUARDIAN II: Brand: GUARDIAN

## (undated) DEVICE — ADHESIVE SKIN HIGH VISCOSITY EXOFIN 1ML

## (undated) DEVICE — SUT ETHILON 3-0 FS-1 18 IN 663G

## (undated) DEVICE — BETHLEHEM MAJOR GENERAL PACK: Brand: CARDINAL HEALTH

## (undated) DEVICE — GLOVE SRG BIOGEL ECLIPSE 8

## (undated) DEVICE — SUT PDS II 1 CTX 36 IN Z371T

## (undated) DEVICE — SUT SILK 3-0 SH CR/8 18 IN C013D

## (undated) DEVICE — DRAIN SPONGES,6 PLY: Brand: EXCILON

## (undated) DEVICE — CHLORAPREP HI-LITE 26ML ORANGE

## (undated) DEVICE — SUT VICRYL 3-0 SH 27 IN J416H

## (undated) DEVICE — TUBE FEEDING MIC JEJUNAL 14FR

## (undated) DEVICE — PLUMEPEN PRO 10FT

## (undated) DEVICE — INTENDED FOR TISSUE SEPARATION, AND OTHER PROCEDURES THAT REQUIRE A SHARP SURGICAL BLADE TO PUNCTURE OR CUT.: Brand: BARD-PARKER SAFETY BLADES SIZE 15, STERILE

## (undated) DEVICE — SYRINGE 10ML LL

## (undated) DEVICE — SUT MONOCRYL 4-0 PS-2 18 IN Y496G

## (undated) DEVICE — GLOVE INDICATOR PI UNDERGLOVE SZ 8 BLUE

## (undated) DEVICE — NEEDLE 25G X 1 1/2